# Patient Record
Sex: FEMALE | Race: WHITE | NOT HISPANIC OR LATINO | Employment: FULL TIME | URBAN - METROPOLITAN AREA
[De-identification: names, ages, dates, MRNs, and addresses within clinical notes are randomized per-mention and may not be internally consistent; named-entity substitution may affect disease eponyms.]

---

## 2017-01-09 ENCOUNTER — TELEPHONE (OUTPATIENT)
Dept: FAMILY MEDICINE | Facility: CLINIC | Age: 52
End: 2017-01-09

## 2017-01-09 NOTE — Clinical Note
January 20, 2017    Renée Mcfadden  [address] [facility] [address]      Dear Renée Mcfadden,     We have tried to contact you about your health, but have been unable to reach you.  Please call us as soon as possible so we can provide you with the best care possible.  We will continue to check in with you throughout the year to complete these items of care, if you are not able to complete these items at this time.  If you would like to complete the missing items for your care, please contact us at [phone].    We recommend the following:  -schedule a MAMMOGRAM 1 in 8 women will develop invasive breast cancer during her lifetime and it is the most common non-skin cancer in American women.  EARLY detection, new treatments, and a better understanding of the disease have increased survival rates - the 5 year survival rate in the 1960s was 63% and today it is close to 90% .  Please disregard this reminder if you have had this exam elsewhere within the last year.  It would be helpful for us to have a copy of your mammogram report in our file so that we can best coordinate your care - please contact us with when your test was done so we can update your record. Please call [phone] to schedule your mammogram today.   -schedule a COLONOSCOPY to look for colon cancer (due every 10 years or 5 years in higher risk situations.)   Colon cancer is now the second leading cause of death in the [place] for both men and women and there are over 130,000 new cases and 50,000 deaths per year from colon cancer.  Colonoscopies can prevent 90-95% of these deaths.  Problem lesions can be removed before they ever become cancer.  This test is not only looking for cancer, but also getting rid of precancerious lesions.  If you do not wish to do a colonoscopy or cannot afford to do one, at this time, there is another option. It is called a   -schedule a PAP SMEAR EXAM which is due.  Please disregard  this reminder if you have had this exam elsewhere within the last year.  It would be helpful for us to have a copy of your recent pap smear report in our file so that we can best coordinate your care.  -schedule a PHYSICAL with your provider.    Sincerely,     Your Care Team at [facility]

## 2017-01-09 NOTE — Clinical Note
January 9, 2017    Renée Mcfadden  [address]  [facility] [address]    Dear Renée    We care about your health and have reviewed your health plan. We have reviewed your medical conditions, medication list, and lab results and are making recommendations based on this review, to better manage your health.    You are in particular need of attention regarding:  - Scheduling a Breast Cancer Screening (Mammography) [phone]  - Scheduling a Colon Cancer Screening (Colonoscopy only) [phone]  - Scheduling a Physical with a Cervical Cancer Screening (Pap Smear) age 64 and younger [phone]      Here is a list of Health Maintenance topics that are due now or due soon:  Health Maintenance Due   Topic Date Due     HEPATITIS C SCREENING  09/23/1982     PAP SCREENING Q3 YR (SYSTEM ASSIGNED)  09/23/1985     MAMMO SCREEN Q2 YR (SYSTEM ASSIGNED)  09/23/2004     LIPID SCREEN Q5 YR FEMALE (SYSTEM ASSIGNED)  09/23/2009     COLON CANCER SCREEN (SYSTEM ASSIGNED)  09/23/2014     INFLUENZA VACCINE (SYSTEM ASSIGNED)  09/01/2016     We will be calling you in the next couple of weeks to help you schedule any appointments that are needed.  Please call us at [phone] (or use [employer]) to address the above recommendations.     Thank you for trusting [facility] and we appreciate the opportunity to serve you.  We look forward to supporting your healthcare needs in the future.    Healthy Regards,    Dr. Engelmann/rosalie

## 2017-01-09 NOTE — TELEPHONE ENCOUNTER
Panel Management Review      Patient has the following on her problem list:     Hypertension   Last three blood pressure readings:  BP Readings from Last 3 Encounters:   11/15/16 140/90   10/19/15 142/80   06/30/14 161/101     Blood pressure: Failed    HTN Guidelines:  Age 18-59 BP range:  Less than 140/90  Age 60-85 with Diabetes:  Less than 140/90  Age 60-85 without Diabetes:  less than 150/90      Composite cancer screening  Chart review shows that this patient is due/due soon for the following Pap Smear, Mammogram and Colonoscopy  Summary:    Patient is due/failing the following:   COLONOSCOPY, LDL, PAP and PHYSICAL    Action needed:   Patient needs office visit for pap, mammogram, colonoscopy.    Type of outreach:    Sent letter. 1/9/17    Questions for provider review:    None                                                                                                                                    Laura Bazzi [facility]        Chart routed to Care Team .

## 2017-01-20 NOTE — TELEPHONE ENCOUNTER
Called patient and left a message to return call and schedule an appointment for health maintenance items that are due. Final PM letter mailed.  Laura Bazzi CMA

## 2017-04-05 ENCOUNTER — TELEPHONE (OUTPATIENT)
Dept: FAMILY MEDICINE | Facility: CLINIC | Age: 52
End: 2017-04-05

## 2017-04-05 NOTE — LETTER
April 18, 2017    Renée Mcfadden  [address]  [facility] [address]      Dear Renée Mcfadden,     We have tried to contact you about your health, but have been unable to reach you.  Please call us as soon as possible so we can provide you with the best care possible.  We will continue to check in with you throughout the year to complete these items of care, if you are not able to complete these items at this time.  If you would like to complete the missing items for your care, please contact us at [phone].    We recommend the following:  -schedule a WELLNESS (Physical) APPOINTMENT with your provider.   I will check fasting labs the same day - nothing to eat except water and meds for 8-10 hours prior.  -schedule a MAMMOGRAM 1 in 8 women will develop invasive breast cancer during her lifetime and it is the most common non-skin cancer in American women.  EARLY detection, new treatments, and a better understanding of the disease have increased survival rates - the 5 year survival rate in the 1960s was 63% and today it is close to 90% .  Please disregard this reminder if you have had this exam elsewhere within the last year.  It would be helpful for us to have a copy of your mammogram report in our file so that we can best coordinate your care - please contact us with when your test was done so we can update your record. Please call [phone] to schedule your mammogram today.     Sincerely,     Your Care Team at [place]

## 2017-04-05 NOTE — TELEPHONE ENCOUNTER
4/5/17- Routed to Dr. Engelmann to please sign order for mammogram. Thanks!    Martha Mendoza MA

## 2017-04-05 NOTE — TELEPHONE ENCOUNTER
Panel Management Review      Patient has the following on her problem list:       IVD   ASA: FAILED    Last LDL:    No results found for: CHOL  No results found for: HDL  No results found for: LDL  No results found for: TRIG   No results found for: CHOLHDLRATIO     Is the patient on a Statin? NO   Is the patient on Aspirin? NO                    Last three blood pressure readings:  BP Readings from Last 3 Encounters:   11/15/16 140/90   10/19/15 142/80   06/30/14 (!) 161/101        Tobacco History:     History   Smoking Status     Current Every Day Smoker     Packs/day: 0.50     Years: 35.00     Types: Cigarettes     Start date: 1/1/1979   Smokeless Tobacco     Never Used         Composite cancer screening  Chart review shows that this patient is due/due soon for the following Mammogram  Summary:    Patient is due/failing the following:   MAMMOGRAM    Action needed:   Patient needs referral/order: Mammogram    Type of outreach:    Sent letter.    Questions for provider review:    None                                                                                                                                    Martha Mendoza MA       Chart routed to Care Team .

## 2017-04-05 NOTE — LETTER
April 5, 2017    Renée Mcfadden  [address]  [facility] [address]    Dear Renée    We care about your health and have reviewed your health plan. We have reviewed your medical conditions, medication list, and lab results and are making recommendations based on this review, to better manage your health.    You are in particular need of attention regarding:  - Scheduling a Breast Cancer Screening (Mammography) [phone]      Here is a list of Health Maintenance topics that are due now or due soon:  Health Maintenance Due   Topic Date Due     HEPATITIS C SCREENING  09/23/1982     PAP SCREENING Q3 YR (SYSTEM ASSIGNED)  09/23/1985     MAMMO SCREEN Q2 YR (SYSTEM ASSIGNED)  09/23/2004     LIPID SCREEN Q5 YR FEMALE (SYSTEM ASSIGNED)  09/23/2009     COLON CANCER SCREEN (SYSTEM ASSIGNED)  09/23/2014     We will be calling you in the next couple of weeks to help you schedule any appointments that are needed.  Please call us at [phone] (or use [employer]) to address the above recommendations.     Thank you for trusting [facility] and we appreciate the opportunity to serve you.  We look forward to supporting your healthcare needs in the future.    Healthy Regards,    Your Care Team

## 2017-04-06 NOTE — TELEPHONE ENCOUNTER
Please call her first and see if she plans to see a provider at this clinic for primary care. If so, she needs a physical. Thank you. I will wait to sign orders until we hear back from her.     Lauren Engelmann, MD

## 2017-04-18 NOTE — TELEPHONE ENCOUNTER
4/18/17- 2nd attempt calling patient, she was not available. Left another message to call us back when she can.  Closed encounter and final letter sent for Mammogram and physical.  Martha Mendoza MA

## 2017-06-17 ENCOUNTER — HEALTH MAINTENANCE LETTER (OUTPATIENT)
Age: 52
End: 2017-06-17

## 2017-07-07 ENCOUNTER — TELEPHONE (OUTPATIENT)
Dept: FAMILY MEDICINE | Facility: CLINIC | Age: 52
End: 2017-07-07

## 2017-07-07 DIAGNOSIS — Z12.31 ENCOUNTER FOR SCREENING MAMMOGRAM FOR BREAST CANCER: Primary | ICD-10-CM

## 2017-07-07 NOTE — TELEPHONE ENCOUNTER
Panel Management Review      Patient has the following on her problem list: None      Composite cancer screening  Chart review shows that this patient is due/due soon for the following Pap Smear, Mammogram and Colonoscopy  Summary:    Patient is due/failing the following:   COLONOSCOPY, MAMMOGRAM and PAP    Action needed:   Patient needs referral/order: Mammogram, colon and OV for pap    Type of outreach:    Sent letter.    Questions for provider review:    None                                                                                                                                    Martha Mendoza MA       Chart routed to Care Team .

## 2017-07-07 NOTE — LETTER
July 7, 2017    Renée Mcfadden  [address]  [facility] [address]    Dear Renée    We care about your health and have reviewed your health plan. We have reviewed your medical conditions, medication list, and lab results and are making recommendations based on this review, to better manage your health.    You are in particular need of attention regarding:  - Scheduling a Breast Cancer Screening (Mammography) [phone]  - Scheduling a Colon Cancer Screening (Colonoscopy only) [phone]  - Scheduling a Physical with a Cervical Cancer Screening (Pap Smear) age 64 and younger [phone]      Here is a list of Health Maintenance topics that are due now or due soon:  Health Maintenance Due   Topic Date Due     HEPATITIS C SCREENING  09/23/1982     PAP SCREENING Q3 YR (SYSTEM ASSIGNED)  09/23/1985     MAMMO SCREEN Q2 YR (SYSTEM ASSIGNED)  09/23/2004     LIPID SCREEN Q5 YR FEMALE (SYSTEM ASSIGNED)  09/23/2009     COLON CANCER SCREEN (SYSTEM ASSIGNED)  09/23/2014     We will be calling you in the next couple of weeks to help you schedule any appointments that are needed.  Please call us at [phone] (or use [employer]) to address the above recommendations.     Thank you for trusting [facility] and we appreciate the opportunity to serve you.  We look forward to supporting your healthcare needs in the future.    Healthy Regards,    Your Care Team

## 2017-07-07 NOTE — LETTER
July 20, 2017    Renée Mcfadden  [address] [facility] [address]      Dear Renée Mcfadden,     We have tried to contact you about your health, but have been unable to reach you.  Please call us as soon as possible so we can provide you with the best care possible.  We will continue to check in with you throughout the year to complete these items of care, if you are not able to complete these items at this time.  If you would like to complete the missing items for your care, please contact us at [phone].    We recommend the following:  -schedule a MAMMOGRAM 1 in 8 women will develop invasive breast cancer during her lifetime and it is the most common non-skin cancer in American women.  EARLY detection, new treatments, and a better understanding of the disease have increased survival rates - the 5 year survival rate in the 1960s was 63% and today it is close to 90% .  Please disregard this reminder if you have had this exam elsewhere within the last year.  It would be helpful for us to have a copy of your mammogram report in our file so that we can best coordinate your care - please contact us with when your test was done so we can update your record. Please call [phone] to schedule your mammogram today.   -schedule a COLONOSCOPY to look for colon cancer (due every 10 years or 5 years in higher risk situations.)   Colon cancer is now the second leading cause of death in the [place] for both men and women and there are over 130,000 new cases and 50,000 deaths per year from colon cancer.  Colonoscopies can prevent 90-95% of these deaths.  Problem lesions can be removed before they ever become cancer.  This test is not only looking for cancer, but also getting rid of precancerious lesions.  If you do not wish to do a colonoscopy or cannot afford to do one, at this time, there is another option. It is called a   -schedule a PAP SMEAR EXAM which is due.  Please disregard this  reminder if you have had this exam elsewhere within the last year.  It would be helpful for us to have a copy of your recent pap smear report in our file so that we can best coordinate your care.    Sincerely,     Your Care Team at [place]

## 2017-07-09 NOTE — TELEPHONE ENCOUNTER
Mammogram order signed.     Brielle Jon MD.   Family Physician.  [facility].

## 2017-10-09 ENCOUNTER — TELEPHONE (OUTPATIENT)
Dept: FAMILY MEDICINE | Facility: CLINIC | Age: 52
End: 2017-10-09

## 2017-10-09 NOTE — TELEPHONE ENCOUNTER
Panel Management Review      Patient has the following on her problem list: None      Composite cancer screening  Chart review shows that this patient is due/due soon for the following Pap Smear, Mammogram and Colonoscopy  Summary:    Patient is due/failing the following:   COLONOSCOPY, MAMMOGRAM, PAP and PHYSICAL    Action needed:   Patient needs office visit for physical, pap, mammogram, colonoscopy.    Type of outreach:    Sent letter. 10/9/17    Questions for provider review:    None                                                                                                                                    Laura Bazzi [facility]        Chart routed to Care Team .

## 2017-10-09 NOTE — LETTER
October 9, 2017    Renée Mcfadden  [address]  [facility] [address]    Dear Renée    We care about your health and have reviewed your health plan. We have reviewed your medical conditions, medication list, and lab results and are making recommendations based on this review, to better manage your health.    You are in particular need of attention regarding:  - Scheduling a Breast Cancer Screening (Mammography) [phone]  - Scheduling a Colon Cancer Screening (Colonoscopy only) [phone]  - Scheduling a Physical with a Cervical Cancer Screening (Pap Smear) age 64 and younger [phone]    Here is a list of Health Maintenance topics that are due now or due soon:  Health Maintenance Due   Topic Date Due     HEPATITIS C SCREENING  09/23/1982     PAP SCREENING Q3 YR (SYSTEM ASSIGNED)  09/23/1985     LIPID SCREEN Q5 YR FEMALE (SYSTEM ASSIGNED)  09/23/2009     MAMMO SCREEN Q2 YR (SYSTEM ASSIGNED)  09/23/2014     COLON CANCER SCREEN (SYSTEM ASSIGNED)  09/23/2014     INFLUENZA VACCINE (SYSTEM ASSIGNED)  09/01/2017     We will be calling you in the next couple of weeks to help you schedule any appointments that are needed.  Please call us at [phone] (or use [employer]) to address the above recommendations.     Thank you for trusting [facility] and we appreciate the opportunity to serve you.  We look forward to supporting your healthcare needs in the future.    Healthy Regards, Dr, Engelmann/rosalie

## 2017-10-10 ENCOUNTER — TELEPHONE (OUTPATIENT)
Dept: FAMILY MEDICINE | Facility: CLINIC | Age: 52
End: 2017-10-10

## 2017-10-10 DIAGNOSIS — I10 BENIGN ESSENTIAL HYPERTENSION: ICD-10-CM

## 2017-10-10 DIAGNOSIS — I73.00 RAYNAUD'S DISEASE WITHOUT GANGRENE: ICD-10-CM

## 2017-10-10 RX ORDER — NIFEDIPINE 60 MG/1
60 TABLET, EXTENDED RELEASE ORAL DAILY
Qty: 30 TABLET | Refills: 0 | Status: SHIPPED | OUTPATIENT
Start: 2017-10-10 | End: 2017-11-10

## 2017-10-10 NOTE — TELEPHONE ENCOUNTER
Reason for Call:  Medication or medication refill:    Do you use a [facility]?  Name of the pharmacy and phone number for the current request:  [id number] - [place], [place] - [address] [place] [address] [address] AT [facility] [address]    Name of the medication requested: NIFEdipine ER osmotic (PROCARDIA XL) 60 MG TB24    Other request: Patient is requesting refill. Please call if any questions.    Can we leave a detailed message on this number? YES    Phone number patient can be reached at: Home number on file [phone] (home)    Best Time: Anytime    Call taken on 10/10/2017 at 1:56 PM by Cheyenne Arredondo

## 2017-11-10 ENCOUNTER — RESULT FOLLOW UP (OUTPATIENT)
Dept: FAMILY MEDICINE | Facility: CLINIC | Age: 52
End: 2017-11-10

## 2017-11-10 ENCOUNTER — OFFICE VISIT (OUTPATIENT)
Dept: FAMILY MEDICINE | Facility: CLINIC | Age: 52
End: 2017-11-10
Payer: COMMERCIAL

## 2017-11-10 VITALS
HEART RATE: 76 BPM | TEMPERATURE: 97.5 F | BODY MASS INDEX: 33.42 KG/M2 | OXYGEN SATURATION: 97 % | WEIGHT: 196 LBS | DIASTOLIC BLOOD PRESSURE: 86 MMHG | SYSTOLIC BLOOD PRESSURE: 133 MMHG

## 2017-11-10 DIAGNOSIS — R87.612 PAPANICOLAOU SMEAR OF CERVIX WITH LOW GRADE SQUAMOUS INTRAEPITHELIAL LESION (LGSIL): ICD-10-CM

## 2017-11-10 DIAGNOSIS — I10 BENIGN ESSENTIAL HYPERTENSION: ICD-10-CM

## 2017-11-10 DIAGNOSIS — I73.00 RAYNAUD'S DISEASE WITHOUT GANGRENE: ICD-10-CM

## 2017-11-10 DIAGNOSIS — Z13.220 SCREENING FOR LIPID DISORDERS: ICD-10-CM

## 2017-11-10 DIAGNOSIS — Z12.11 SPECIAL SCREENING FOR MALIGNANT NEOPLASMS, COLON: ICD-10-CM

## 2017-11-10 DIAGNOSIS — Z01.419 WELL WOMAN EXAM WITH ROUTINE GYNECOLOGICAL EXAM: Primary | ICD-10-CM

## 2017-11-10 LAB
ANION GAP SERPL CALCULATED.3IONS-SCNC: 12 MMOL/L (ref 3–14)
BUN SERPL-MCNC: 9 MG/DL (ref 7–30)
CALCIUM SERPL-MCNC: 9 MG/DL (ref 8.5–10.1)
CHLORIDE SERPL-SCNC: 105 MMOL/L (ref 94–109)
CHOLEST SERPL-MCNC: 223 MG/DL
CO2 SERPL-SCNC: 22 MMOL/L (ref 20–32)
CREAT SERPL-MCNC: 0.66 MG/DL (ref 0.52–1.04)
GFR SERPL CREATININE-BSD FRML MDRD: >90 ML/MIN/1.7M2
GLUCOSE SERPL-MCNC: 90 MG/DL (ref 70–99)
HDLC SERPL-MCNC: 76 MG/DL
LDLC SERPL CALC-MCNC: 133 MG/DL
NONHDLC SERPL-MCNC: 147 MG/DL
POTASSIUM SERPL-SCNC: 4.2 MMOL/L (ref 3.4–5.3)
SODIUM SERPL-SCNC: 139 MMOL/L (ref 133–144)
TRIGL SERPL-MCNC: 69 MG/DL

## 2017-11-10 PROCEDURE — 87624 HPV HI-RISK TYP POOLED RSLT: CPT | Performed by: FAMILY MEDICINE

## 2017-11-10 PROCEDURE — 36415 COLL VENOUS BLD VENIPUNCTURE: CPT | Performed by: FAMILY MEDICINE

## 2017-11-10 PROCEDURE — 86803 HEPATITIS C AB TEST: CPT | Performed by: FAMILY MEDICINE

## 2017-11-10 PROCEDURE — 99396 PREV VISIT EST AGE 40-64: CPT | Performed by: FAMILY MEDICINE

## 2017-11-10 PROCEDURE — G0145 SCR C/V CYTO,THINLAYER,RESCR: HCPCS | Performed by: FAMILY MEDICINE

## 2017-11-10 PROCEDURE — G0124 SCREEN C/V THIN LAYER BY MD: HCPCS | Performed by: FAMILY MEDICINE

## 2017-11-10 PROCEDURE — 80048 BASIC METABOLIC PNL TOTAL CA: CPT | Performed by: FAMILY MEDICINE

## 2017-11-10 PROCEDURE — 80061 LIPID PANEL: CPT | Performed by: FAMILY MEDICINE

## 2017-11-10 RX ORDER — NIFEDIPINE 60 MG/1
60 TABLET, EXTENDED RELEASE ORAL DAILY
Qty: 90 TABLET | Refills: 3 | Status: SHIPPED | OUTPATIENT
Start: 2017-11-10 | End: 2018-12-12

## 2017-11-10 ASSESSMENT — PAIN SCALES - GENERAL: PAINLEVEL: NO PAIN (0)

## 2017-11-10 NOTE — LETTER
January 10, 2018      Renée Mcfadden  [address] [facility] [address]    Dear ,      At [facility], your health and wellness is our primary concern. That is why we are following up on an abnormal pap from 11/10/17, which was reported as LSIL and positive for high risk HPV 16 and 18. Your provider had recommended that you have a Colposcopy completed by 2/10/18. Our records do not show that this has been scheduled.    It is important to complete the follow up that your provider has suggested for you to ensure that there are no worsening changes which may, over time, develop into cancer.      Please contact our [place] office at  [phone] to schedule an appointment for a Colposcopy at your earliest convenience. If you have questions or concerns, please call the clinic and we will be happy to assist you.    If you have completed the tests outside of [facility], please have the results forwarded to our office. We will update the chart for your primary Physician to review before your next annual physical.     Thank you for choosing [facility]!    Sincerely,      Lauren A. Engelmann, MD/benjy

## 2017-11-10 NOTE — PROGRESS NOTES
SUBJECTIVE:   CC: Renée Mcfadden is an 52 year old woman who presents for preventive health visit.   Answers for HPI/ROS submitted by the patient on 11/10/2017   PHQ-2 Score: 2    Physical   Annual:     Getting at least 3 servings of Calcium per day::  NO    Bi-annual eye exam::  Yes    Dental care twice a year::  NO    Sleep apnea or symptoms of sleep apnea::  Excessive snoring    Diet::  Low salt    Taking medications regularly::  No    Barriers to taking medications::  Problems remembering to take them        Would like you to look at left mole on left arm.      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     Social History   Substance Use Topics     Smoking status: Current Every Day Smoker     Packs/day: 0.50     Years: 35.00     Types: Cigarettes     Start date: 1/1/1979     Smokeless tobacco: Never Used     Alcohol use 0.0 oz/week     0 Standard drinks or equivalent per week      Comment: 3-4 beers weekly     The patient does not drink >3 drinks per day nor >7 drinks per week.    Reviewed orders with patient.  Reviewed health maintenance and updated orders accordingly - Yes  Labs reviewed in EPIC  BP Readings from Last 3 Encounters:   11/10/17 133/86   11/15/16 140/90   10/19/15 142/80    Wt Readings from Last 3 Encounters:   11/10/17 196 lb (88.9 kg)   11/15/16 194 lb (88 kg)   10/19/15 204 lb 12.8 oz (92.9 kg)                      Patient over age 50, mutual decision to screen reflected in health maintenance.      Pertinent mammograms are reviewed under the imaging tab.  History of abnormal Pap smear: NO  - age 30-65 PAP every 5 years with negative HPV co-testing recommended    Reviewed and updated as needed this visit by clinical staff         Reviewed and updated as needed this visit by Provider          Review of Systems  C: NEGATIVE for fever, chills, change in weight  I: NEGATIVE for worrisome rashes, moles or lesions  E: NEGATIVE for vision changes or irritation  ENT: NEGATIVE for ear, mouth and throat problems  R: NEGATIVE for significant cough or SOB  B: NEGATIVE for masses, tenderness or discharge  CV: NEGATIVE for chest pain, palpitations or peripheral edema  GI: NEGATIVE for nausea, abdominal pain, heartburn, or change in bowel habits  : NEGATIVE for unusual urinary or vaginal symptoms. Periods are regular.  M: NEGATIVE for significant arthralgias or myalgia  N: NEGATIVE for weakness, dizziness or paresthesias  P: NEGATIVE for changes in mood or affect     OBJECTIVE:   There were no vitals taken for this visit.  Physical Exam  GENERAL: healthy, alert, no distress and over weight  NECK: no adenopathy, no asymmetry, masses, or scars and thyroid normal to palpation  RESP: lungs clear to auscultation - no rales, rhonchi or wheezes  CV: regular rate and rhythm, normal S1 S2, no S3 or S4, no murmur, click or rub, no peripheral edema and peripheral pulses strong  ABDOMEN: soft, nontender, no hepatosplenomegaly, no masses and bowel sounds normal   (female): normal female external genitalia, normal urethral meatus, vaginal mucosa, normal cervix/adnexa/uterus without masses or discharge  MS: no gross musculoskeletal defects noted, no edema  NEURO: Normal strength and tone, mentation intact and speech normal  BACK: no CVA tenderness, no paralumbar tenderness  PSYCH: mentation appears normal, affect normal/bright    ASSESSMENT/PLAN:       ICD-10-CM    1. Well woman exam with routine gynecological exam Z01.419 *MA Screening Digital Bilateral     HPV High Risk Types DNA Cervical     Hepatitis C Screen Reflex to HCV  "RNA Quant and Genotype   2. Raynaud's disease without gangrene I73.00 Basic metabolic panel     NIFEdipine ER osmotic (PROCARDIA XL) 60 MG TB24   3. Benign essential hypertension I10 NIFEdipine ER osmotic (PROCARDIA XL) 60 MG TB24   4. Screening for lipid disorders Z13.220 Lipid panel reflex to direct LDL Fasting   5. Special screening for malignant neoplasms, colon Z12.11 Fecal colorectal cancer screen (FIT)     Pap imaged thin layer screen with HPV - recommended age 30 - 65 years (select HPV order below)       COUNSELING:  Reviewed preventive health counseling, as reflected in patient instructions       Regular exercise       Colon cancer screening       Consider Hep C screening for patients born between 1945 and 1965       Consider lung cancer screening for ages 55-80 years and 30 pack-year smoking history        One time pneumovax for smokers         reports that she has been smoking Cigarettes.  She started smoking about 38 years ago. She has a 17.50 pack-year smoking history. She has never used smokeless tobacco.  Tobacco Cessation Action Plan: Information offered: Patient not interested at this time  Estimated body mass index is 33.08 kg/(m^2) as calculated from the following:    Height as of 10/19/15: 5' 4.21\" (1.631 m).    Weight as of 11/15/16: 194 lb (88 kg).   Weight management plan: Discussed healthy diet and exercise guidelines and patient will follow up in 6 months in clinic to re-evaluate.    Counseling Resources:  ATP IV Guidelines  Pooled Cohorts Equation Calculator  Breast Cancer Risk Calculator  FRAX Risk Assessment  ICSI Preventive Guidelines  Dietary Guidelines for Americans, 2010  USDA's MyPlate  ASA Prophylaxis  Lung CA Screening    Lauren A. Engelmann, MD  [facility]  "

## 2017-11-10 NOTE — MR AVS SNAPSHOT
After Visit Summary   11/10/2017    Renée Mcfadden    MRN: [id number]           Patient Information     Date Of Birth          1965        Visit Information        Provider Department      11/10/2017 8:40 AM Engelmann, Lauren Anneliese, MD [facility]        Today's Diagnoses     Well woman exam with routine gynecological exam    -  1    Raynaud's disease without gangrene        Benign essential hypertension        Screening for lipid disorders        Special screening for malignant neoplasms, colon           Follow-ups after your visit        Your next 10 appointments already scheduled     Dec 04, 2017  7:30 AM CST   MA SCREENING DIGITAL BILATERAL with CPMA1   [facility]    [address]  [place] [address]   [phone]           Do not use any powder, lotion or deodorant under your arms or on your breast. If you do, we will ask you to remove it before your exam.  Wear comfortable, two-piece clothing.  If you have any allergies, tell your care team.  Bring any previous mammograms from other facilities or have them mailed to the breast center.              Future tests that were ordered for you today     Open Future Orders        Priority Expected Expires Ordered    *MA Screening Digital Bilateral Routine  11/10/2018 11/10/2017    Fecal colorectal cancer screen (FIT) Routine 12/1/2017 2/2/2018 11/10/2017            Who to contact     If you have questions or need follow up information about today's clinic visit or your schedule please contact [facility] directly at [phone].  Normal or non-critical lab and imaging results will be communicated to you by MyChart, letter or phone within 4 business days after the clinic has received the results. If you do not hear from us within 7 days, please contact the clinic through MyChart or phone. If you have a critical or  abnormal lab result, we will notify you by phone as soon as possible.  Submit refill requests through [facility] or call your pharmacy and they will forward the refill request to us. Please allow 3 business days for your refill to be completed.          Additional Information About Your Visit        [employer] Information     [facility] gives you secure access to your electronic health record. If you see a primary care provider, you can also send messages to your care team and make appointments. If you have questions, please call your primary care clinic.  If you do not have a primary care provider, please call [phone] and they will assist you.        Care EveryWhere ID     This is your Care EveryWhere ID. This could be used by other organizations to access your [facility] medical records  [id number]        Your Vitals Were     Pulse Temperature Pulse Oximetry BMI (Body Mass Index)          76 97.5  F (36.4  C) (Oral) 97% 33.42 kg/m2         Blood Pressure from Last 3 Encounters:   11/10/17 133/86   11/15/16 140/90   10/19/15 142/80    Weight from Last 3 Encounters:   11/10/17 196 lb (88.9 kg)   11/15/16 194 lb (88 kg)   10/19/15 204 lb 12.8 oz (92.9 kg)              We Performed the Following     Basic metabolic panel     Hepatitis C Screen Reflex to HCV RNA Quant and Genotype     HPV High Risk Types DNA Cervical     Lipid panel reflex to direct LDL Fasting     Pap imaged thin layer screen with HPV - recommended age 30 - 65 years (select HPV order below)          Where to get your medicines      These medications were sent to [id number] - [place], [place] - [address] [facility] [address] [facility] [address]  [address] [facility], [place] [address]     Phone:  [phone]     NIFEdipine ER osmotic 60 MG Tb24          Primary Care Provider Office Phone # Fax #    [facility] [phone]       [address] [facility] [address]        Equal  Access to Services     [facility]: Hadii aad ku hadtrevingretel Eloisakirk, waaristidesda luqadaha, qasimonta kaarmandocecile simmons. So [facility] [phone].    ATENCIÓN: Si habla español, tiene a beatty disposición servicios gratuitos de asistencia lingüística. Llame al [phone].    We comply with applicable federal civil rights laws and [place] laws. We do not discriminate on the basis of race, color, national origin, age, disability, sex, sexual orientation, or gender identity.            Thank you!     Thank you for choosing [facility]  for your care. Our goal is always to provide you with excellent care. Hearing back from our patients is one way we can continue to improve our services. Please take a few minutes to complete the written survey that you may receive in the mail after your visit with us. Thank you!             Your Updated Medication List - Protect others around you: Learn how to safely use, store and throw away your medicines at www.disposemymeds.org.          This list is accurate as of: 11/10/17 10:04 AM.  Always use your most recent med list.                   Brand Name Dispense Instructions for use Diagnosis    NIFEdipine ER osmotic 60 MG Tb24    PROCARDIA XL    90 tablet    Take 1 tablet (60 mg) by mouth daily    Benign essential hypertension, Raynaud's disease without gangrene

## 2017-11-10 NOTE — NURSING NOTE
"Chief Complaint   Patient presents with     Physical     PAP       Initial /86 (BP Location: Right arm, Patient Position: Chair, Cuff Size: Adult Regular)  Pulse 76  Temp 97.5  F (36.4  C) (Oral)  Wt 196 lb (88.9 kg)  SpO2 97%  BMI 33.42 kg/m2 Estimated body mass index is 33.42 kg/(m^2) as calculated from the following:    Height as of 10/19/15: 5' 4.21\" (1.631 m).    Weight as of this encounter: 196 lb (88.9 kg).  Medication Reconciliation: complete   Martha Mendoza MA      "

## 2017-11-10 NOTE — LETTER
April 28, 2018      Renée Mcfadden  [address] [place] [address]    Dear ,      At [facility], your health and wellness is our primary concern. That is why we are following up on an abnormal pap from 11/10/17, which was reported as LSIL and positive for high risk HPV 16 and 18. Your provider had recommended that you have a Colposcopy completed by 2/10/18. Our records do not show that this has been scheduled.      It is important to complete the follow up that your provider has suggested for you to ensure that there are no worsening changes which may, over time, develop into cancer.      If you have chosen not to do the recommended colposcopy, please contact our [facility] office at [phone] to schedule an appointment for a repeat PAP smear and HPV test at your earliest convenience.    If you have completed the tests outside of [facility], please have the results forwarded to our office. We will update the chart for your primary Physician to review before your next annual physical.     Thank you for choosing [facility]!    Sincerely,      Lauren A. Engelmann, MD/benjy

## 2017-11-11 LAB — HCV AB SERPL QL IA: NONREACTIVE

## 2017-11-16 LAB
COPATH REPORT: ABNORMAL
PAP: ABNORMAL

## 2017-11-17 LAB
FINAL DIAGNOSIS: ABNORMAL
HPV HR 12 DNA CVX QL NAA+PROBE: NEGATIVE
HPV16 DNA SPEC QL NAA+PROBE: POSITIVE
HPV18 DNA SPEC QL NAA+PROBE: POSITIVE
SPECIMEN DESCRIPTION: ABNORMAL

## 2017-11-17 NOTE — PROGRESS NOTES
11/10/17 LSIL pap, + HR HPV 16 and 18. Plan: colp bef 2/10/18  11/20/17 Pt. Notified.  1/10/18 [place] reminder letter sent (rlm)  2/13/18 3 month [place] not done, updated to 6mo [place]/Pap due by 5/10/18 (rlm)  2/19/18 FYI sent to provider.  4/28/18 [place] reminder letter sent (rlm)  2/5/19 colp - no-show (bill)  2/19/19 Pap Follow up reminder call placed, message left with whoever answered home phone (rlm)  3/5/19 Patient is lost to follow-up. Routed to provider as FYI. (rlm) Doned by: Engelmann, Lauren Anneliese, MD     on Wed Mar 6, 2019

## 2017-12-04 ENCOUNTER — RADIANT APPOINTMENT (OUTPATIENT)
Dept: MAMMOGRAPHY | Facility: CLINIC | Age: 52
End: 2017-12-04
Attending: FAMILY MEDICINE
Payer: COMMERCIAL

## 2017-12-04 DIAGNOSIS — Z01.419 WELL WOMAN EXAM WITH ROUTINE GYNECOLOGICAL EXAM: ICD-10-CM

## 2017-12-04 PROCEDURE — G0202 SCR MAMMO BI INCL CAD: HCPCS | Mod: TC

## 2018-07-14 ENCOUNTER — HEALTH MAINTENANCE LETTER (OUTPATIENT)
Age: 53
End: 2018-07-14

## 2018-12-12 DIAGNOSIS — I10 BENIGN ESSENTIAL HYPERTENSION: ICD-10-CM

## 2018-12-12 DIAGNOSIS — I73.00 RAYNAUD'S DISEASE WITHOUT GANGRENE: ICD-10-CM

## 2018-12-12 NOTE — TELEPHONE ENCOUNTER
Routing refill request to provider for review/approval because:  Labs not current:  creatinine  Blood pressure    Selena Fine RN

## 2018-12-12 NOTE — TELEPHONE ENCOUNTER
"Requested Prescriptions   Pending Prescriptions Disp Refills     NIFEdipine ER OSMOTIC (PROCARDIA XL) 60 MG 24 hr tablet [Pharmacy Med Name: NIFEDIPINE 60MG ER (XL/OS) TABLETS] 90 tablet 0    Last Written Prescription Date:  11-10-17  Last Fill Quantity: 90,  # refills: 3   Last office visit: 11/10/2017 with prescribing provider:  11-10-17   Future Office Visit:   Next 5 appointments (look out 90 days)    Dec 14, 2018  2:40 PM CST  PHYSICAL with Lauren Anneliese Engelmann, MD  [facility] [address] [place] [address] [phone]          Sig: TAKE 1 TABLET(60 MG) BY MOUTH DAILY    Calcium Channel Blockers Protocol  Failed - 12/12/2018  3:32 AM       Failed - Blood pressure under 140/90 in past 12 months    BP Readings from Last 3 Encounters:   11/10/17 133/86   11/15/16 140/90   10/19/15 142/80                Failed - Normal serum creatinine on file in past 12 months    Recent Labs   Lab Test 11/10/17  1008   CR 0.66            Passed - Recent (12 mo) or future (30 days) visit within the authorizing provider's specialty    Patient had office visit in the last 12 months or has a visit in the next 30 days with authorizing provider or within the authorizing provider's specialty.  See \"Patient Info\" tab in inbasket, or \"Choose Columns\" in Meds & Orders section of the refill encounter.             Passed - Patient is age 18 or older       Passed - No active pregnancy on record       Passed - No positive pregnancy test in past 12 months          "

## 2018-12-13 RX ORDER — NIFEDIPINE 60 MG/1
TABLET, EXTENDED RELEASE ORAL
Qty: 30 TABLET | Refills: 0 | Status: SHIPPED | OUTPATIENT
Start: 2018-12-13 | End: 2018-12-21

## 2018-12-21 ENCOUNTER — OFFICE VISIT (OUTPATIENT)
Dept: FAMILY MEDICINE | Facility: CLINIC | Age: 53
End: 2018-12-21
Payer: COMMERCIAL

## 2018-12-21 VITALS
DIASTOLIC BLOOD PRESSURE: 92 MMHG | TEMPERATURE: 98.4 F | HEART RATE: 80 BPM | WEIGHT: 185 LBS | HEIGHT: 64 IN | BODY MASS INDEX: 31.58 KG/M2 | OXYGEN SATURATION: 97 % | SYSTOLIC BLOOD PRESSURE: 159 MMHG

## 2018-12-21 DIAGNOSIS — Z13.220 SCREENING FOR LIPID DISORDERS: ICD-10-CM

## 2018-12-21 DIAGNOSIS — Z00.00 ROUTINE HISTORY AND PHYSICAL EXAMINATION OF ADULT: Primary | ICD-10-CM

## 2018-12-21 DIAGNOSIS — I73.00 RAYNAUD'S DISEASE WITHOUT GANGRENE: ICD-10-CM

## 2018-12-21 DIAGNOSIS — I10 BENIGN ESSENTIAL HYPERTENSION: ICD-10-CM

## 2018-12-21 DIAGNOSIS — Z12.11 SPECIAL SCREENING FOR MALIGNANT NEOPLASMS, COLON: ICD-10-CM

## 2018-12-21 DIAGNOSIS — F17.200 SMOKER: ICD-10-CM

## 2018-12-21 DIAGNOSIS — R87.612 PAPANICOLAOU SMEAR OF CERVIX WITH LOW GRADE SQUAMOUS INTRAEPITHELIAL LESION (LGSIL): ICD-10-CM

## 2018-12-21 PROCEDURE — 99396 PREV VISIT EST AGE 40-64: CPT | Performed by: FAMILY MEDICINE

## 2018-12-21 RX ORDER — NIFEDIPINE 60 MG/1
TABLET, EXTENDED RELEASE ORAL
Qty: 90 TABLET | Refills: 3 | Status: SHIPPED | OUTPATIENT
Start: 2018-12-21 | End: 2019-04-09

## 2018-12-21 ASSESSMENT — ENCOUNTER SYMPTOMS
SORE THROAT: 0
FREQUENCY: 0
PARESTHESIAS: 0
SHORTNESS OF BREATH: 0
COUGH: 0
MYALGIAS: 1
FEVER: 0
NERVOUS/ANXIOUS: 1
DIZZINESS: 0
PALPITATIONS: 0
DIARRHEA: 0
WEAKNESS: 0
CHILLS: 0
HEMATURIA: 0
NAUSEA: 0
DYSURIA: 0
ARTHRALGIAS: 1
BREAST MASS: 0
EYE PAIN: 0
CONSTIPATION: 0

## 2018-12-21 ASSESSMENT — PAIN SCALES - GENERAL: PAINLEVEL: NO PAIN (0)

## 2018-12-21 ASSESSMENT — MIFFLIN-ST. JEOR: SCORE: 1424.15
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
## 2018-12-21 NOTE — PATIENT INSTRUCTIONS
QUITPLAN: [phone])    Please call if you need to reschedule your colposcopy.     Come back for fasting labs soon. They are all ordered so you just have to make a lab appointment.

## 2019-02-15 ENCOUNTER — HEALTH MAINTENANCE LETTER (OUTPATIENT)
Age: 54
End: 2019-02-15

## 2019-02-19 ENCOUNTER — TELEPHONE (OUTPATIENT)
Dept: FAMILY MEDICINE | Facility: CLINIC | Age: 54
End: 2019-02-19

## 2019-02-19 NOTE — TELEPHONE ENCOUNTER
Pt is past due for Pap follow up  Reminder letter has been sent  LMTC her clinic with any questions or to schedule    Brittani Connors,   Pap Tracking

## 2019-04-09 ENCOUNTER — MYC REFILL (OUTPATIENT)
Dept: FAMILY MEDICINE | Facility: CLINIC | Age: 54
End: 2019-04-09

## 2019-04-09 ENCOUNTER — MYC MEDICAL ADVICE (OUTPATIENT)
Dept: FAMILY MEDICINE | Facility: CLINIC | Age: 54
End: 2019-04-09

## 2019-04-09 DIAGNOSIS — I10 BENIGN ESSENTIAL HYPERTENSION: ICD-10-CM

## 2019-04-09 DIAGNOSIS — I73.00 RAYNAUD'S DISEASE WITHOUT GANGRENE: ICD-10-CM

## 2019-04-10 RX ORDER — NIFEDIPINE 60 MG/1
TABLET, EXTENDED RELEASE ORAL
Qty: 45 TABLET | Refills: 0 | Status: SHIPPED | OUTPATIENT
Start: 2019-04-10 | End: 2019-05-21

## 2019-04-10 NOTE — TELEPHONE ENCOUNTER
"Requested Prescriptions   Pending Prescriptions Disp Refills     NIFEdipine ER OSMOTIC (PROCARDIA XL) 60 MG 24 hr tablet 90 tablet 3     Sig: TAKE 1 TABLET(60 MG) BY MOUTH DAILY       Calcium Channel Blockers Protocol  Failed - 4/9/2019  8:07 PM        Failed - Blood pressure under 140/90 in past 12 months     BP Readings from Last 3 Encounters:   12/21/18 (!) 159/92   11/10/17 133/86   11/15/16 140/90                 Failed - Normal serum creatinine on file in past 12 months     Recent Labs   Lab Test 11/10/17  1008   CR 0.66             Passed - Recent (12 mo) or future (30 days) visit within the authorizing provider's specialty     Patient had office visit in the last 12 months or has a visit in the next 30 days with authorizing provider or within the authorizing provider's specialty.  See \"Patient Info\" tab in inbasket, or \"Choose Columns\" in Meds & Orders section of the refill encounter.              Passed - Medication is active on med list        Passed - Patient is age 18 or older        Passed - No active pregnancy on record        Passed - No positive pregnancy test in past 12 months      Routing refill request to provider for review/approval because:  Requesting pharmacy does not accept transferred prescriptions       Selena Fine RN      "

## 2019-05-21 ENCOUNTER — MYC MEDICAL ADVICE (OUTPATIENT)
Dept: FAMILY MEDICINE | Facility: CLINIC | Age: 54
End: 2019-05-21

## 2019-05-21 DIAGNOSIS — I73.00 RAYNAUD'S DISEASE WITHOUT GANGRENE: ICD-10-CM

## 2019-05-21 DIAGNOSIS — I10 BENIGN ESSENTIAL HYPERTENSION: ICD-10-CM

## 2019-05-21 RX ORDER — NIFEDIPINE 60 MG/1
TABLET, EXTENDED RELEASE ORAL
Qty: 45 TABLET | Refills: 0 | Status: SHIPPED | OUTPATIENT
Start: 2019-05-21 | End: 2019-05-30

## 2019-05-21 NOTE — TELEPHONE ENCOUNTER
Please see message below.    BP Readings from Last 3 Encounters:   12/21/18 (!) 159/92   11/10/17 133/86   11/15/16 140/90       Reema Fulton RN [facility] Triage.

## 2019-05-30 RX ORDER — NIFEDIPINE 60 MG/1
TABLET, EXTENDED RELEASE ORAL
Qty: 90 TABLET | Refills: 0 | Status: SHIPPED | OUTPATIENT
Start: 2019-05-30 | End: 2019-09-17

## 2019-05-30 RX ORDER — LOSARTAN POTASSIUM 50 MG/1
50 TABLET ORAL DAILY
Qty: 90 TABLET | Refills: 0 | Status: SHIPPED | OUTPATIENT
Start: 2019-05-30 | End: 2019-11-15

## 2019-05-30 NOTE — TELEPHONE ENCOUNTER
I called home number, adult female says patient is out mowing the lawn.   I advised I'd send her a [employer] message if she would ask Renée to look at it later.  She agrees to do this.  [employer] message routed to patient with Dr. Alergia's plan and Rx's sent.    Teresita Calderón RN  [facility]

## 2019-05-30 NOTE — TELEPHONE ENCOUNTER
I sent in a 90 day supply but her blood pressure has not been ideal so we should add a 2nd blood pressure med also.  I sent in prescription for losartan.    Then patient can get her blood pressure checked at our pharmacy in 1-2 months to see if it is at goal.    Please inform patient.    Davidson lAegria MD

## 2019-07-18 ENCOUNTER — TELEPHONE (OUTPATIENT)
Dept: FAMILY MEDICINE | Facility: CLINIC | Age: 54
End: 2019-07-18

## 2019-07-18 NOTE — TELEPHONE ENCOUNTER
Panel Management Review      Patient has the following on her problem list:     Hypertension   Last three blood pressure readings:  BP Readings from Last 3 Encounters:   12/21/18 (!) 159/92   11/10/17 133/86   11/15/16 140/90     Blood pressure: FAILED    HTN Guidelines:  Less than 140/90      Composite cancer screening  Chart review shows that this patient is due/due soon for the following Pap Smear and Fecal Colorectal (FIT)  Summary:    Patient is due/failing the following:   BP CHECK, FIT and PAP    Action needed:   Patient needs nurse only appointment.    Type of outreach:    Sent letter.    Questions for provider review:    None                                                                                                                                    Lucero Gonzalez        Chart routed to n/a .

## 2019-07-18 NOTE — LETTER
July 18, 2019    Renée Mcfadden  [address] [facility] [address]    Dear Renée,    We care about your health and have reviewed your health plan. You are in particular need of attention regarding:    - Your High Blood Pressure, Please call to schedule an appointment with your provider or nurse    Here is a list of other Health Maintenance topics that are due now or due soon:  Health Maintenance Due   Topic Date Due     FIT  09/23/1974     HIV SCREENING  09/23/1979     ZOSTER IMMUNIZATION (1 of 2) 09/23/2014     PAP  05/10/2018     PHQ-2  01/01/2019       Please call us at [phone], or use [employer], to address the above recommendations.     Thank you for trusting [facility] with your healthcare needs.     Healthy Regards,    Your Care Team      (Team 1)

## 2019-09-09 ENCOUNTER — TRANSFERRED RECORDS (OUTPATIENT)
Dept: HEALTH INFORMATION MANAGEMENT | Facility: CLINIC | Age: 54
End: 2019-09-09

## 2019-09-09 LAB
CHOLEST SERPL-MCNC: 213 MG/DL (ref 125–199)
GLUCOSE SERPL-MCNC: 91 MG/DL (ref 70–99)
HDLC SERPL-MCNC: 80 MG/DL
LDLC SERPL CALC-MCNC: 131 MG/DL

## 2019-10-01 ENCOUNTER — HEALTH MAINTENANCE LETTER (OUTPATIENT)
Age: 54
End: 2019-10-01

## 2019-11-15 ENCOUNTER — OFFICE VISIT (OUTPATIENT)
Dept: FAMILY MEDICINE | Facility: CLINIC | Age: 54
End: 2019-11-15
Payer: COMMERCIAL

## 2019-11-15 VITALS
SYSTOLIC BLOOD PRESSURE: 136 MMHG | BODY MASS INDEX: 32.61 KG/M2 | WEIGHT: 191 LBS | HEIGHT: 64 IN | HEART RATE: 77 BPM | TEMPERATURE: 98.6 F | DIASTOLIC BLOOD PRESSURE: 80 MMHG

## 2019-11-15 DIAGNOSIS — I73.00 RAYNAUD'S DISEASE WITHOUT GANGRENE: ICD-10-CM

## 2019-11-15 DIAGNOSIS — Z12.11 SCREEN FOR COLON CANCER: ICD-10-CM

## 2019-11-15 DIAGNOSIS — Z91.89 PNEUMOCOCCAL VACCINATION INDICATED: ICD-10-CM

## 2019-11-15 DIAGNOSIS — B97.7 HPV IN FEMALE: ICD-10-CM

## 2019-11-15 DIAGNOSIS — I10 BENIGN ESSENTIAL HYPERTENSION: ICD-10-CM

## 2019-11-15 DIAGNOSIS — Z00.00 ENCOUNTER FOR PREVENTATIVE ADULT HEALTH CARE EXAMINATION: Primary | ICD-10-CM

## 2019-11-15 DIAGNOSIS — Z12.31 VISIT FOR SCREENING MAMMOGRAM: ICD-10-CM

## 2019-11-15 PROCEDURE — 90732 PPSV23 VACC 2 YRS+ SUBQ/IM: CPT | Performed by: FAMILY MEDICINE

## 2019-11-15 PROCEDURE — 99396 PREV VISIT EST AGE 40-64: CPT | Mod: 25 | Performed by: FAMILY MEDICINE

## 2019-11-15 PROCEDURE — 90471 IMMUNIZATION ADMIN: CPT | Performed by: FAMILY MEDICINE

## 2019-11-15 RX ORDER — NIFEDIPINE 60 MG/1
TABLET, EXTENDED RELEASE ORAL
Qty: 90 TABLET | Refills: 3 | Status: SHIPPED | OUTPATIENT
Start: 2019-11-15 | End: 2020-10-09

## 2019-11-15 ASSESSMENT — ENCOUNTER SYMPTOMS
DIARRHEA: 0
DIZZINESS: 0
CHILLS: 0
HEMATOCHEZIA: 0
ABDOMINAL PAIN: 0
CONSTIPATION: 0
EYE PAIN: 0
HEMATURIA: 0
FEVER: 0
NERVOUS/ANXIOUS: 1
FREQUENCY: 0
COUGH: 0

## 2019-11-15 ASSESSMENT — PAIN SCALES - GENERAL: PAINLEVEL: NO PAIN (0)

## 2019-11-15 ASSESSMENT — MIFFLIN-ST. JEOR: SCORE: 1442.88

## 2019-11-15 NOTE — PATIENT INSTRUCTIONS
Try to quit smoking entirely    Could use patch/ gum if desired ( no smoking while using these )    Keep working on healthy diet/exercise and weight loss    Advise seeing gynecology; call to schedule    Return the FIT test

## 2019-11-15 NOTE — PROGRESS NOTES
SUBJECTIVE:   CC: Renée Mcfadden is an 54 year old woman who presents for preventive health visit.     Healthy Habits:     Getting at least 3 servings of Calcium per day:  NO    Bi-annual eye exam:  NO    Dental care twice a year:  NO    Sleep apnea or symptoms of sleep apnea:  Daytime drowsiness and Excessive snoring    Diet:  Low salt, Low fat/cholesterol and Carbohydrate counting    Frequency of exercise:  2-3 days/week    Duration of exercise:  Less than 15 minutes    Taking medications regularly:  Yes    Medication side effects:  None    PHQ-2 Total Score: 2    Additional concerns today:  No      Less than 1 ppd     Has nicotine gum and patch at home        none    Today's PHQ-2 Score:   PHQ-2 ( 1999 Pfizer) 11/15/2019   Q1: Little interest or pleasure in doing things 1   Q2: Feeling down, depressed or hopeless 1   PHQ-2 Score 2   Q1: Little interest or pleasure in doing things Several days   Q2: Feeling down, depressed or hopeless Several days   PHQ-2 Score 2       Abuse: Current or Past(Physical, Sexual or Emotional)- No  Do you feel safe in your environment? Yes    Have you ever done Advance Care Planning? (For example, a Health Directive, POLST, or a discussion with a medical provider or your loved ones about your wishes): No, advance care planning information given to patient to review.  Patient declined advance care planning discussion at this time.    Social History     Tobacco Use     Smoking status: Current Every Day Smoker     Packs/day: 0.50     Years: 35.00     Pack years: 17.50     Types: Cigarettes     Start date: 1/1/1979     Smokeless tobacco: Never Used   Substance Use Topics     Alcohol use: Yes     Alcohol/week: 0.0 standard drinks     Comment: 3-4 beers weekly     If you drink alcohol do you typically have >3 drinks per day or >7 drinks per week? Yes      Alcohol Use 11/15/2019   Prescreen: >3 drinks/day or >7 drinks/week? No   Prescreen: >3 drinks/day or >7 drinks/week? -   No  "flowsheet data found.    Reviewed orders with patient.  Reviewed health maintenance and updated orders accordingly - Yes       Mammogram Screening: Patient over age 50, mutual decision to screen reflected in health maintenance.    Pertinent mammograms are reviewed under the imaging tab.  History of abnormal Pap smear: yes, long history of this  Had colposcopies done  Had endometriosis in past  PAP / HPV Latest Ref Rng & Units 11/10/2017   PAP - LSIL(A)   HPV 16 DNA NEG:Negative Positive(A)   HPV 18 DNA NEG:Negative Positive(A)   OTHER HR HPV NEG:Negative Negative     Reviewed and updated as needed this visit by clinical staff  Tobacco  Allergies  Meds  Med Hx  Surg Hx  Fam Hx  Soc Hx        Reviewed and updated as needed this visit by Provider            Review of Systems   Constitutional: Negative for chills and fever.   HENT: Negative for congestion and ear pain.    Eyes: Negative for pain.   Respiratory: Negative for cough.    Cardiovascular: Negative for chest pain.   Gastrointestinal: Negative for abdominal pain, constipation, diarrhea and hematochezia.   Genitourinary: Negative for frequency and hematuria.   Neurological: Negative for dizziness.   Psychiatric/Behavioral: The patient is nervous/anxious.      Had dequervains    Trying to be careful but on computer a lot    Shoulder not great    Has raynauds     On computer most of time    Just changed jobs    Now at "[employer]    Exercise mostly yardwork, snow removal etc            OBJECTIVE:   /80 (BP Location: Left arm, Patient Position: Chair, Cuff Size: Adult Regular)   Pulse 77   Temp 98.6  F (37  C) (Oral)   Ht 1.62 m (5' 3.78\")   Wt 86.6 kg (191 lb)   Breastfeeding No   BMI 33.01 kg/m    Physical Exam  GENERAL: healthy, alert and no distress  EYES: Eyes grossly normal to inspection, PERRL and conjunctivae and sclerae normal  HENT: ear canals and TM's normal, nose and mouth without ulcers or lesions  NECK: no adenopathy, no asymmetry, " masses, or scars and thyroid normal to palpation  RESP: lungs clear to auscultation - no rales, rhonchi or wheezes   CV: regular rate and rhythm, normal S1 S2, no S3 or S4, no murmur, click or rub, no peripheral edema and peripheral pulses strong  ABDOMEN: soft, nontender, no hepatosplenomegaly, no masses and bowel sounds normal  MS: no gross musculoskeletal defects noted, no edema  SKIN: no suspicious lesions or rashes  NEURO: Normal strength and tone, mentation intact and speech normal  PSYCH: mentation appears normal, affect normal/bright    Diagnostic Test Results:  Labs reviewed in Epic    ASSESSMENT/PLAN:   Renée was seen today for physical and health maintenance.    Diagnoses and all orders for this visit:    Encounter for preventative adult health care examination    Screen for colon cancer  -     Fecal colorectal cancer screen (FIT); Future    Visit for screening mammogram  -     *MA Screening Digital Bilateral; Future    Pneumococcal vaccination indicated  -     VACCINE ADMINISTRATION, INITIAL  -     PNEUMOCOCCAL VACCINE,ADULT,SQ OR IM  -     SCREENING QUESTIONS FOR ADULT IMMUNIZATIONS    Benign essential hypertension  -     NIFEdipine ER OSMOTIC (PROCARDIA XL) 60 MG 24 hr tablet; TAKE 1 TABLET DAILY    Raynaud's disease without gangrene  -     NIFEdipine ER OSMOTIC (PROCARDIA XL) 60 MG 24 hr tablet; TAKE 1 TABLET DAILY    HPV in female  -     OB/GYN REFERRAL    Discussed multiple issues with patient  With the history of multiple cervix procedures and the pos hpv 2 years ago ( failed to follow up at that time) prudent to see gynecology. Patient to call and schedule.  Fit test   Refill nefedipine  Patient to call and schedule mammogram  Strongly advised smoking cessation    COUNSELING:  Reviewed preventive health counseling, as reflected in patient instructions       Regular exercise       Healthy diet/nutrition       Vision screening       Safe sex practices/STD prevention       Colon cancer  "screening    Estimated body mass index is 33.01 kg/m  as calculated from the following:    Height as of this encounter: 1.62 m (5' 3.78\").    Weight as of this encounter: 86.6 kg (191 lb).    Weight management plan: Discussed healthy diet and exercise guidelines     reports that she has been smoking cigarettes. She started smoking about 40 years ago. She has a 17.50 pack-year smoking history. She has never used smokeless tobacco.  Tobacco Cessation Action Plan: Pharmacotherapies : Nicotine patch and other Nicotine replacement    Counseling Resources:  ATP IV Guidelines  Pooled Cohorts Equation Calculator  Breast Cancer Risk Calculator  FRAX Risk Assessment  ICSI Preventive Guidelines  Dietary Guidelines for Americans, 2010  USDA's MyPlate  ASA Prophylaxis  Lung CA Screening    Davidson Alegria MD  [facility]  "

## 2020-03-22 ENCOUNTER — HEALTH MAINTENANCE LETTER (OUTPATIENT)
Age: 55
End: 2020-03-22

## 2020-10-09 ENCOUNTER — NURSE TRIAGE (OUTPATIENT)
Dept: NURSING | Facility: CLINIC | Age: 55
End: 2020-10-09

## 2020-10-09 ENCOUNTER — ANCILLARY PROCEDURE (OUTPATIENT)
Dept: ULTRASOUND IMAGING | Facility: CLINIC | Age: 55
End: 2020-10-09
Attending: PHYSICIAN ASSISTANT
Payer: COMMERCIAL

## 2020-10-09 ENCOUNTER — TELEPHONE (OUTPATIENT)
Dept: FAMILY MEDICINE | Facility: CLINIC | Age: 55
End: 2020-10-09

## 2020-10-09 ENCOUNTER — OFFICE VISIT (OUTPATIENT)
Dept: FAMILY MEDICINE | Facility: CLINIC | Age: 55
End: 2020-10-09
Payer: COMMERCIAL

## 2020-10-09 VITALS
TEMPERATURE: 98.6 F | HEIGHT: 65 IN | BODY MASS INDEX: 30.97 KG/M2 | WEIGHT: 185.9 LBS | HEART RATE: 103 BPM | DIASTOLIC BLOOD PRESSURE: 98 MMHG | SYSTOLIC BLOOD PRESSURE: 157 MMHG

## 2020-10-09 DIAGNOSIS — M79.662 PAIN OF LEFT LOWER LEG: ICD-10-CM

## 2020-10-09 DIAGNOSIS — M79.89 LEFT LEG SWELLING: ICD-10-CM

## 2020-10-09 DIAGNOSIS — I82.4Y9 DEEP VEIN THROMBOSIS (DVT) OF PROXIMAL LOWER EXTREMITY, UNSPECIFIED CHRONICITY, UNSPECIFIED LATERALITY (H): Primary | ICD-10-CM

## 2020-10-09 DIAGNOSIS — I10 BENIGN ESSENTIAL HYPERTENSION: Primary | ICD-10-CM

## 2020-10-09 DIAGNOSIS — F17.200 SMOKER: ICD-10-CM

## 2020-10-09 PROCEDURE — 99215 OFFICE O/P EST HI 40 MIN: CPT | Performed by: PHYSICIAN ASSISTANT

## 2020-10-09 RX ORDER — NIFEDIPINE 60 MG/1
TABLET, EXTENDED RELEASE ORAL
Qty: 90 TABLET | Refills: 3 | Status: SHIPPED | OUTPATIENT
Start: 2020-10-09 | End: 2021-01-12

## 2020-10-09 ASSESSMENT — MIFFLIN-ST. JEOR: SCORE: 1429.73

## 2020-10-09 ASSESSMENT — PAIN SCALES - GENERAL: PAINLEVEL: SEVERE PAIN (7)

## 2020-10-09 NOTE — PROGRESS NOTES
"Subjective     Renée Mcfadden is a 55 year old female who presents to clinic today for the following health issues:    HPI         Musculoskeletal problem/pain  Onset/Duration: x2 weeks ago  Description  Location: leg - left  Joint Swelling: YES  Redness: YES  Pain: YES  Warmth: YES- a little  Intensity:  mild, moderate, severe  Progression of Symptoms:  worsening  Accompanying signs and symptoms:   Fevers: no  Numbness/tingling/weakness: YES- numbness and tingling- sometimes  History  Trauma to the area: no  Recent illness:  no  Previous similar problem: YES- several years ago  Previous evaluation:  YES  Precipitating or alleviating factors:  Aggravating factors include: sitting,   Therapies tried and outcome: rest/inactivity and ibuprofen     Works 1-12 hours per day on the computer. Wondering if it is from sitting. Swelling started first then the pain.   Swelling better in the morning after sleeping and laying down.   Swelling mostly from the knee down.   Only on the left leg.   Had a DVT after vein striping and ablation.     Did not take her nifedipine this morning.   No chest pain, no shortness of breath    Review of Systems   Constitutional, HEENT, cardiovascular, pulmonary, gi and gu systems are negative, except as otherwise noted.      Objective    BP (!) 157/98 (BP Location: Left arm, Patient Position: Chair, Cuff Size: Adult Large)   Pulse 103   Temp 98.6  F (37  C) (Oral)   Ht 1.644 m (5' 4.72\")   Wt 84.3 kg (185 lb 14.4 oz)   BMI 31.20 kg/m    Body mass index is 31.2 kg/m .  Physical Exam   GENERAL: healthy, alert and no distress  RESP: lungs clear to auscultation - no rales, rhonchi or wheezes  CV: regular rate and rhythm, normal S1 S2, no S3 or S4, no murmur, click or rub, MS: no gross musculoskeletal defects noted, Left calf with pain to palpation in the upper 1/2, tightness to the calf muscle. 1+ pitting edema on the left tibia. full range of motion of the left leg. No redness or warmth " appreciated.   NEURO: Normal strength and tone, mentation intact and speech normal          Assessment & Plan     Benign essential hypertension  Not controlled, but did not take meds today. Needed refills.   - NIFEdipine ER OSMOTIC (PROCARDIA XL) 60 MG 24 hr tablet; TAKE 1 TABLET DAILY    Smoker  Encouraged cessation    Pain of left lower leg  Concern for possible DVT. Stat ultrasound today. If positive patient would be good candidate for Eliquis or similar.   - US Lower Extremity Venous Duplex Left; Future    Left leg swelling  - US Lower Extremity Venous Duplex Left; Future   No follow-ups on file.    Sena Sage PA-C  [facility]

## 2020-10-09 NOTE — TELEPHONE ENCOUNTER
I called and spoke to patient, advised her of Rx sent and advice.  Dr. Alegria is her PCP per her report, scheduled follow up with him on 10/22/20.    To ER if having chest pain or shortness of breath, call if concerns sooner.    Patient verbalized understanding of and agreement with plan.    Teresita Calderón RN  [facility]

## 2020-10-09 NOTE — TELEPHONE ENCOUNTER
I sent in the starter pack prescription for  eliquis to her pharmacy.  Start this tonight.  Stay well hydrated.    Follow up in clinic in 1-2 weeks.    Call or be seen sooner if needed.    Please inform patient    Davidson Alegria MD

## 2020-10-09 NOTE — TELEPHONE ENCOUNTER
Renée calls and says that she had an ultrasound done today and the ultrasound shows that she has a DVT in her left, lower leg. Pt. Says that she went to  her Eliquis prescription at her [facility] and that pharmacy told pt. That they will not have this medication until Monday. Pt. Says that she has not called other [email or web address] to see if they have Eliquis but will do this now. Pt. Says that if she cannot find a [facility] that has Eliquis, she will call back and have her Eliquis prescription called to a [facility] pharmacy. COVID 19 Nurse Triage Plan/Patient Instructions    Please be aware that novel coronavirus (COVID-19) may be circulating in the community. If you develop symptoms such as fever, cough, or SOB or if you have concerns about the presence of another infection including coronavirus (COVID-19), please contact your health care provider or visit [email or web address].     Disposition/Instructions    Home care recommended. Follow home care protocol based instructions.    Thank you for taking steps to prevent the spread of this virus.  o Limit your contact with others.  o Wear a simple mask to cover your cough.  o Wash your hands well and often.    Resources    [facility]: About COVID-19: [email or web address]    CDC: What to Do If You're Sick: www.cdc.gov/coronavirus/2019-ncov/about/steps-when-sick.html    CDC: Ending Home Isolation: www.cdc.gov/coronavirus/2019-ncov/hcp/disposition-in-home-patients.html     CDC: Caring for Someone: www.cdc.gov/coronavirus/2019-ncov/if-you-are-sick/care-for-someone.html     [facility]: Interim Guidance for Hospital Discharge to Home: www.health.[facility].[place]/diseases/coronavirus/hcp/hospdischarge.pdf    [facility] clinical trials (COVID-19 research studies): clinicalaffairs.[facility]/umn-clinical-trials     Below are the COVID-19 hotlines at the [place] Department of Health ([facility]). Interpreters are available.   o For [employer]  questions: Call 740-234-1397 or 1-954.690.8520 (7 a.m. to 7 p.m.)  o For questions about schools and childcare: Call 694-384-8411 or 1-427.831.5864 (7 a.m. to 7 p.m.)                     Additional Information    Negative: RN needs further essential information from caller in order to complete triage    Negative: [1] Caller is not with the adult (patient) AND [2] reporting urgent symptoms    Negative: Lab result questions    Negative: Medication questions    Negative: Caller can't be reached by phone    Negative: Caller has already spoken to PCP or another triager    Negative: Requesting regular office appointment    Negative: [1] Caller requesting NON-URGENT health information AND [2] PCP's office is the best resource    Negative: Health Information question, no triage required and triager able to answer question    Negative: General information question, no triage required and triager able to answer question    Negative: Question about upcoming scheduled test, no triage required and triager able to answer question    Negative: [1] Caller is not with the adult (patient) AND [2] probable NON-URGENT symptoms    [1] Follow-up call to recent contact AND [2] information only call, no triage required    Protocols used: INFORMATION ONLY CALL-A-

## 2020-10-09 NOTE — TELEPHONE ENCOUNTER
Red flag call taken, Juany states patient has positive DVT left lower extremity.     She cannot leave the imaging waiting room until she has been contacted with plan for this.  Waiting room phone number is [phone]; patient is sitting next to phone and knows to answer if it rings.    Patient was seen by Sena Sage today; Sena is gone for the day.      See plan:   Pain of left lower leg  Concern for possible DVT. Stat ultrasound today. If positive patient would be good candidate for Eliquis or similar.   - US Lower Extremity Venous Duplex Left; Future      Routed high priority to Dr. Alegria as covering provider to address.    Teresita Calderón RN  [facility]

## 2020-10-10 NOTE — TELEPHONE ENCOUNTER
"Pt reports \"diagnosed with DVT LLE today\". Eliquis starter pack prescribed. [facility] or any [facility] does not have per pt. Per pt \"need prior authorization anyway\". Pt states she will see if pharmacist can give her enough to last until prior authorization can be done, otherwise \"will take my chances\". Pt states pharmacist told her \"nobody has it\" in terms of \"starter pack\" for Eliquis but pharmacist will make her a \"bridge starter pack\".     Advised pt to call back with any further concerns or questions.    Pt verbalizes understanding and agrees to plan.     Additional Information    Caller has already spoken to PCP or another triager    Caller has already spoken with another triager or PCP AND has further questions AND triager able to answer questions.    Protocols used: INFORMATION ONLY CALL-A-AH, NO CONTACT OR DUPLICATE CONTACT CALL-A-AH      "

## 2020-10-12 ENCOUNTER — TELEPHONE (OUTPATIENT)
Dept: FAMILY MEDICINE | Facility: CLINIC | Age: 55
End: 2020-10-12

## 2020-10-12 DIAGNOSIS — I82.4Y9 DEEP VEIN THROMBOSIS (DVT) OF PROXIMAL LOWER EXTREMITY, UNSPECIFIED CHRONICITY, UNSPECIFIED LATERALITY (H): Primary | ICD-10-CM

## 2020-10-12 NOTE — TELEPHONE ENCOUNTER
RN spoke with patient.     She states she believes that Eliquis will not be covered at the 5mg dose, though states it would be worth trying to send in an Rx. She's also open to the lovenox/warfarin combination.     Routing to PCP - send both alternatives to see if covered?     Team will again attempt to talk with [employer]/pharmacy tomorrow to determine covered alternatives, but patient needs something in meantime. Is currently out of medication.       Lynn Mcgovern, RN, BSN, PHN  [facility] [place]

## 2020-10-12 NOTE — TELEPHONE ENCOUNTER
Patient stated that medication needs prior auth through [employer] [facility], and the local pharmacies do not heron this starter pack and they would have to order this in. Patient paid $117 for 12 pills to get her by.    Patient is wondering if there is something else that can be sent in for her to take while she waits for the prior authorization for this from mail order. She stated that [employer] [facility] has to agree to pay for the alternative at a local pharmacy.       Called [employer] [facility], they are having computer issues. Been waiting 20 min for her to figure out her system issues. Transferred to another associate she stated I need to talk to the PA people and will transfer me. Nurse has been on hold for over greater than 10 min. Representative could not tell me a covered alternative only that a PA is needed for the medication that was sent in.     Nurse has been on this call for >45 min with no guidance as to what will be covered for patient or how to get this PA approved. An hour on hold nurse advised that something needs to be done and we need to know what will be covered at a local pharmacy to get patient through until the PA is approved. Call disconnected at > 60 mins.     Routed to provider in clinic to see what they recommend as an alternative for patient to take as she will be out of medication today.     Thank you.   Selena Fine RN

## 2020-10-12 NOTE — TELEPHONE ENCOUNTER
Reason for Call:  Medication or medication refill:    Do you use a [facility]?  Name of the pharmacy and phone number for the current request:  [id number] - [place], [place] - [address] [place] [address] RD [address] AT [facility] [address]   Phone:  [phone]   Fax:  [phone]     This is for immediate refill at [facility] and [facility] is for long term.[facility] - [address]   Phone:  [phone]   Fax:  [phone]         Name of the medication requested: Eliquis    Other request: PT states would like a call and needs as soon as possible and unable to pay out of pocket.    Can we leave a detailed message on this number? YES    Phone number patient can be reached at: Home number on file [phone] (home)    Best Time: Anytime    Call taken on 10/12/2020 at 3:36 PM by Katherine Medel

## 2020-10-12 NOTE — TELEPHONE ENCOUNTER
Our only other option would be lovenox and warfarin. Which may also need a PA.   If needed I can write for the 5 mg dose outside of the starter pack?  Sena Sage PA-C

## 2020-10-13 PROBLEM — I82.4Y9 DEEP VEIN THROMBOSIS (DVT) OF PROXIMAL LOWER EXTREMITY, UNSPECIFIED CHRONICITY, UNSPECIFIED LATERALITY (H): Status: ACTIVE | Noted: 2020-10-13

## 2020-10-13 RX ORDER — WARFARIN SODIUM 5 MG/1
5 TABLET ORAL DAILY
Qty: 30 TABLET | Refills: 0 | Status: SHIPPED | OUTPATIENT
Start: 2020-10-13 | End: 2020-10-22 | Stop reason: ALTCHOICE

## 2020-10-13 NOTE — TELEPHONE ENCOUNTER
Attempted to call pharmacy to see if 5 mg eliquis covered as sent this AM, pharmacy closed until 9 am.  Call later, giving them some time to process this.    Teresita Calderón RN  [facility]

## 2020-10-13 NOTE — TELEPHONE ENCOUNTER
We can try a PA. I just know that even a stat PA can be a lengthy process and patient may be without medication for up to 1-2 weeks.   We have been having significant difficulty with her insurance so I am not optimistic this will be processed quickly.   If patient is willing she could start only the lovenox until we get the PA approved or denied for the Eliquis.   Sena Sage PA-C      no

## 2020-10-13 NOTE — TELEPHONE ENCOUNTER
I believe she told me she missed some doses, has 4 of the 5 mg tabs left so only has 2 more full 10 mg doses.    I will have her take 2 tabs tonight and 2 tabs in the AM.  Does she start the lovenox tomorrow afternoon then?    Also, again, please advise on any activity or lifting restrictions regarding the DVT.    Routed back to Sena Sage to address.    Teresita Calderón RN  [facility]

## 2020-10-13 NOTE — TELEPHONE ENCOUNTER
Prior Authorization Approval    Authorization Effective Date: 9/13/2020  Authorization Expiration Date: 10/13/2021  Medication: eliquis-APPROVED  Approved Dose/Quantity:   Reference #:     Insurance Company: [employer] - Phone [phone] Fax [phone]  Expected CoPay:       CoPay Card Available:      Foundation Assistance Needed:    Which Pharmacy is filling the prescription (Not needed for infusion/clinic administered): [id number] - [place], [place] - [address] AT [facility] [address] [facility]  Pharmacy Notified: Yes  Patient Notified: No    Pharmacy will notify patient when medication is ready.

## 2020-10-13 NOTE — TELEPHONE ENCOUNTER
She should avoid strenuous exercise and can do daily activities.   Yes can start lovenox tomorrow afternoon.   Sena Sage PA-C

## 2020-10-13 NOTE — TELEPHONE ENCOUNTER
I called and spoke to patient, she will finish out her remaining eliquis and start the lovenox while we start PA but wants to know, should she just take 5 mg eliquis BId until gone or take 2 tabs BID and then start lovenox?     Also, still wonders about any activity restrictions or cautions regarding the DVT.    Routed back to PCP, will route to [facility] PA pool after questions addressed.    Teresita Calderón RN  [facility]

## 2020-10-13 NOTE — TELEPHONE ENCOUNTER
She should start the lovenox today, likely only 1 dose by the time she gets it. Then BID tomorrow and start warfarin tomorrow night. She needs an appointment with INR clinic in 3 days for initial consultation.   Sena Sage PA-C

## 2020-10-13 NOTE — TELEPHONE ENCOUNTER
I called and spoke to patient, she is not too happy about having to do warfarin and the costs associated with the labs and visits; she wants to know billing codes so she can get an estimate.  Advised she call billing.   She wonders why a PA can't be tried?   She has 4 tabs of the eliquis left (5 mg).   Original dosing was 2 tabs BID, if she takes 1 tab BID that buys us another day and a half, can we try PA?    Also, she wants to know what physical restrictions she has; can she mow her lawn?   Any lifting restrictions in regards to her leg with clot which is sore and swollen?    If PA advised, will route to [facility] PA team.    Routed to Sena Sage to advise.    Teresita Calderón RN  [facility]

## 2020-10-13 NOTE — TELEPHONE ENCOUNTER
Call was placed to pt to let her know that the PA was approved and requested her to  from pharmacy. [facility] will follow up tomorrow to ensure a safe anticoagulation plan and she didn't have any difficulty with picking up Eliquis.     Stephan Addison, Anticoagulation RN

## 2020-10-13 NOTE — TELEPHONE ENCOUNTER
Central Prior Authorization Team   Phone: [phone]      PA Initiation    Medication: eliquis-Initiated  Insurance Company: [employer] - Phone [phone] Fax [phone]  Pharmacy Filling the Rx: [facility] #[id number] - [place], [place] - [address] AT [facility] OF [address]  Filling Pharmacy Phone: [phone]  Filling Pharmacy Fax:    Start Date: 10/13/2020

## 2020-10-13 NOTE — TELEPHONE ENCOUNTER
"I called and spoke to Yolanda, the eliquis Rx received today still requires PA.       She ran a trial of lovenox (not knowing the official dose) and found it is covered but a 5 day supply (10 syringes) has a copay of $130.    I asked her to \"run\" warfarin.   That is covered, will be about $8 for a month.    Routed back to Sena Sage to advise.    Teresita Calderón RN  [facility]      "

## 2020-10-13 NOTE — TELEPHONE ENCOUNTER
I called and spoke to patient, advised her of activity advice.   She will take 10 mg eliquis tonight and 10 mg in the AM and start the lovenox tomorrow evening and will plan to stay on this until PA addressed for the eliquis.    She will not  the warfarin until we find out if eliquis PA will be approved or not.    Routed to [facility] PA pool to initiate PA for eliquis 5 mg tabs.    Teresita Calderón RN  [facility]

## 2020-10-13 NOTE — TELEPHONE ENCOUNTER
Central PA team member sent me instant message, can't start PA as provider discontinued both of the eliquis Rx's.    Routed to Sena Sage to re-order.    Teresita Calderón RN  [facility]

## 2020-10-13 NOTE — TELEPHONE ENCOUNTER
Let's start with the 5mg dose and I will write for lovenox if not covered. If needing lovenox will need INR clinic etc.   Sena Sage PA-C

## 2020-10-13 NOTE — TELEPHONE ENCOUNTER
I am sorry, in this chain of calls I had assumed she was out of medication at this time. She should take 10mg 2 times a day for 7 days if possible. I thought I had read that she only has 12 pills which only would last her 3 days and she should be out by now?   Sena Sage PA-C

## 2020-10-14 NOTE — TELEPHONE ENCOUNTER
Received a call from Renée this morning to confirm that she picked up her Eliquis last night. She expressed concerns about the ongoing cost of the medication. Gave her information on assistance with co-pay programs through the Eliquis  at their web site or via telephone at [phone].   Pt stated that she will not be starting the Lovenox or warfarin due to obtaining Eliquis. Will discharge her from the [facility] program.     Thank you,    Stephan Addison, Anticoagulation RN

## 2020-10-22 ENCOUNTER — OFFICE VISIT (OUTPATIENT)
Dept: FAMILY MEDICINE | Facility: CLINIC | Age: 55
End: 2020-10-22
Payer: COMMERCIAL

## 2020-10-22 VITALS
WEIGHT: 186 LBS | TEMPERATURE: 97.9 F | BODY MASS INDEX: 31.22 KG/M2 | HEART RATE: 83 BPM | DIASTOLIC BLOOD PRESSURE: 85 MMHG | OXYGEN SATURATION: 97 % | SYSTOLIC BLOOD PRESSURE: 135 MMHG

## 2020-10-22 DIAGNOSIS — I82.4Y2 DEEP VEIN THROMBOSIS (DVT) OF PROXIMAL VEIN OF LEFT LOWER EXTREMITY, UNSPECIFIED CHRONICITY (H): Primary | ICD-10-CM

## 2020-10-22 DIAGNOSIS — F17.200 SMOKER: ICD-10-CM

## 2020-10-22 DIAGNOSIS — I10 BENIGN ESSENTIAL HYPERTENSION: ICD-10-CM

## 2020-10-22 PROCEDURE — 99213 OFFICE O/P EST LOW 20 MIN: CPT | Performed by: FAMILY MEDICINE

## 2020-10-22 RX ORDER — APIXABAN 5 MG/1
5 TABLET, FILM COATED ORAL 2 TIMES DAILY
COMMUNITY
Start: 2020-10-13 | End: 2020-10-22

## 2020-10-22 ASSESSMENT — PAIN SCALES - GENERAL: PAINLEVEL: NO PAIN (0)

## 2020-10-22 NOTE — PATIENT INSTRUCTIONS
Talk with pharmacist to see if one  Blood thinner more affordable    Smoking cessation    Increase walking/ exercise    Stay well hydrated    Get up and  Walk frequently    See us in a couple months, sooner if needed

## 2020-10-22 NOTE — LETTER
[address] [address] [phone]             2020    To whom it may concern:    Renée Mcfadden  63 is a patient of our clinic who just had a blood clot in the leg.  Advise she  Get up and walk for 10 minutes every hour.              Sincerely,                   Davidson Alegria MD

## 2020-10-22 NOTE — PROGRESS NOTES
Subjective     Renée Mcfadden is a 55 year old female who presents to clinic today for the following health issues:    HPI         Follow up on blood clot       Review of Systems    patient had some hassles with the eliquis    Working from home for 7 months    Smoking some          Objective    /85 (BP Location: Left arm, Patient Position: Chair, Cuff Size: Adult Regular)   Pulse 83   Temp 97.9  F (36.6  C) (Oral)   Wt 84.4 kg (186 lb)   SpO2 97%   Breastfeeding No   BMI 31.22 kg/m    Body mass index is 31.22 kg/m .  Physical Exam  Constitutional:       Appearance: She is well-developed.   HENT:      Head: Normocephalic and atraumatic.   Eyes:      Conjunctiva/sclera: Conjunctivae normal.   Neck:      Vascular: No carotid bruit.   Cardiovascular:      Rate and Rhythm: Normal rate and regular rhythm.      Heart sounds: Normal heart sounds.   Pulmonary:      Effort: Pulmonary effort is normal. No respiratory distress.      Breath sounds: Normal breath sounds.   Neurological:      Mental Status: She is alert and oriented to person, place, and time.      left lower leg edema, per patient it is a bit better than when she  Was in last week    Sensation and range of motion okay in feet / toes                     ASSESSMENT / PLAN:  (I82.4Y2) Deep vein thrombosis (DVT) of proximal vein of left lower extremity, unspecified chronicity (H)  (primary encounter diagnosis)  Comment: patient is tolerating the  eliquis okay but it is very expensive.  I gave her written prescriptions for this and another common med in this class.  She will show them to pharmacist and see if one is cheaper.  Fill  That one.  Call with problems.    Plan: rivaroxaban ANTICOAGULANT (XARELTO) 20 MG TABS         tablet, apixaban ANTICOAGULANT (ELIQUIS) 5 MG         tablet        Advised more walking/ activity and more hydration.    (F17.200) Smoker  Comment: discussed   Plan: strongly advise  Complete cessation     (I10) Benign essential  hypertension  Comment: at goal   Plan: no change    See us in a couple months, sooner if needed     Be seen promptly if symptoms acutely worsen     Patient should be on blood thinner  For at least  6 months.  Maybe  Permanently since this is 2nd episode in life.      I reviewed the patient's medications, allergies, medical history, family history, and social history.    Davidson Alegria MD

## 2020-11-11 ENCOUNTER — TELEPHONE (OUTPATIENT)
Dept: FAMILY MEDICINE | Facility: CLINIC | Age: 55
End: 2020-11-11

## 2020-11-11 NOTE — TELEPHONE ENCOUNTER
Prior Authorization Retail Medication Request    Medication/Dose: Xarelto  ICD code (if different than what is on RX):    Previously Tried and Failed:    Rationale:      Insurance Name:  [employer]   Insurance ID:  [id number]      Pharmacy Information (if different than what is on RX)  Name:  [facility]   Phone:  [phone]

## 2020-11-12 NOTE — TELEPHONE ENCOUNTER
Central Prior Authorization Team   Phone: [phone]    PA Initiation    Medication: Xarelto  Insurance Company: [employer] - Phone [phone] Fax [phone]  Pharmacy Filling the Rx: [id number] - [place], [place] - [address] AT [facility] [address]  Filling Pharmacy Phone: [phone]  Filling Pharmacy Fax: [phone]  Start Date: 11/12/2020

## 2020-11-13 NOTE — TELEPHONE ENCOUNTER
Prior Authorization Approval    Authorization Effective Date: 10/13/2020  Authorization Expiration Date: 11/12/2021  Medication: Xarelto-APPROVED  Approved Dose/Quantity:    Reference #:     Insurance Company: [employer] - Phone [phone] Fax [phone]  Expected CoPay:       CoPay Card Available:      Foundation Assistance Needed:    Which Pharmacy is filling the prescription (Not needed for infusion/clinic administered): [id number] - [place], [place] - [address] [place] [address] NE AT [facility] [address] [facility]  Pharmacy Notified: Yes  Patient Notified: Yes  **Instructed pharmacy to notify patient when script is ready to /ship.**

## 2020-11-16 ENCOUNTER — TELEPHONE (OUTPATIENT)
Dept: FAMILY MEDICINE | Facility: CLINIC | Age: 55
End: 2020-11-16

## 2020-11-16 DIAGNOSIS — Z86.718 PERSONAL HISTORY OF DVT (DEEP VEIN THROMBOSIS): Primary | ICD-10-CM

## 2020-11-16 RX ORDER — WARFARIN SODIUM 5 MG/1
TABLET ORAL
Qty: 30 TABLET | Refills: 1 | Status: SHIPPED | OUTPATIENT
Start: 2020-11-16 | End: 2021-01-12

## 2020-11-16 NOTE — TELEPHONE ENCOUNTER
Patient is requesting to change eliquis and xarelto to warfin or coumadin. She stated that insurance only covers for partial and she cannot afford it. Please advise.

## 2020-11-16 NOTE — TELEPHONE ENCOUNTER
Routed to [facility] to reach out to patient regarding coumadin start.    Teresita Calderón RN  [facility]

## 2020-11-16 NOTE — TELEPHONE ENCOUNTER
Patient was seen 10/22/20 by Dr. Alegria.    She was to shop around with written Rx's for both eliquis and xarelto and see which was cheaper and fill the cheaper one.    It appears PA was approved for eliquis in October.    I called and spoke to patient, she says even with the PA her cost is $400 for a month of eliquis.   She has some left but would rather switch to coumadin and deal with the INR clinic instead.    States she can't afford the cost of this med on top of what she owes the clinic already.    Pharmacy cued, routed to Dr. Alegria to advise.   Will need new anticoag clinic referral, I see the Oct 12 one has been discontinued.    Teresita Calderón RN  [facility]

## 2020-11-16 NOTE — TELEPHONE ENCOUNTER
I did referral for INR clinic.  Patient should see INR  Nurse who can help her get started on the warfarin.  I  Did send in prescription for warfarin but INR nurse can help her get started and manage it.  Stop the other  Prescription blood thinner when starting warfarin.    Please inform patient.    Davidson Alegria MD

## 2020-11-17 NOTE — TELEPHONE ENCOUNTER
Patient to start warfarin the day after she takes her last Eliquis. She thinks her last day will be the 21st so will start Warfarin on the 22nd and come in for INR on 11/25. Advised if she starts warfarin later than the 22nd to reschedule her lab for 3-4 days after she takes her 1st dose.  Patient verbalized understanding and has no further questions or concerns.      Farrah Lopez RN - [facility]

## 2020-11-30 ENCOUNTER — TRANSFERRED RECORDS (OUTPATIENT)
Dept: HEALTH INFORMATION MANAGEMENT | Facility: CLINIC | Age: 55
End: 2020-11-30

## 2020-12-02 ENCOUNTER — ANTICOAGULATION THERAPY VISIT (OUTPATIENT)
Dept: NURSING | Facility: CLINIC | Age: 55
End: 2020-12-02

## 2020-12-02 DIAGNOSIS — Z86.718 PERSONAL HISTORY OF DVT (DEEP VEIN THROMBOSIS): ICD-10-CM

## 2020-12-02 LAB
CAPILLARY BLOOD COLLECTION: NORMAL
INR PPP: 1.8 (ref 0.86–1.14)

## 2020-12-02 PROCEDURE — 36416 COLLJ CAPILLARY BLOOD SPEC: CPT | Performed by: FAMILY MEDICINE

## 2020-12-02 PROCEDURE — 85610 PROTHROMBIN TIME: CPT | Performed by: FAMILY MEDICINE

## 2020-12-02 NOTE — PROGRESS NOTES
ANTICOAGULATION MANAGEMENT     Patient Name:  Renée Mcfadden  Date:  2020    ASSESSMENT /SUBJECTIVE:    Today's INR result of 1.8 is subtherapeutic. Goal INR of 2.0-3.0      Warfarin dose taken: Warfarin taken as instructed    Diet: No new diet changes affecting INR    Medication changes/ interactions: No interaction expected between clindamycin and warfarin    Previous INR: NA    S/S of bleeding or thromboembolism: No    New injury or illness: Yes: Cellulits, osteomyelitis    Upcoming surgery, procedure or cardioversion: No    Additional findings: None      PLAN:    Telephone call with Renée regarding INR result and instructed:     Warfarin Dosing Instructions: Change your warfarin dose to 7.5 mg Wed and 5 mg ROW    Instructed patient to follow up no later than: 4 days  Lab visit scheduled    Education provided: Target INR goal and significance of current INR result      Renée verbalizes understanding and agrees to warfarin dosing plan.    Instructed to call the Anticoagulation Clinic for any changes, questions or concerns. (#[phone])        Farrah Lopez RN      OBJECTIVE:  Recent labs: (last 7 days)     20  1510   INR 1.80*         No question data found.  Anticoagulation Summary  As of 2020    INR goal:  2.0-3.0   TTR:  --   INR used for dosin.80 (2020)   Warfarin maintenance plan:  5 mg (5 mg x 1) every day   Full warfarin instructions:  5 mg every day   Weekly warfarin total:  35 mg   Plan last modified:  Farrah Lopez, RN (2020)   Next INR check:     Target end date:  2021    Indications    Personal history of DVT (deep vein thrombosis) [Z86.718]             Anticoagulation Episode Summary     INR check location:      Preferred lab:      Send INR reminders to:  [facility]    Comments:        Anticoagulation Care Providers     Provider Role Specialty Phone number    Davidson Alegria MD Referring Family Medicine [phone]

## 2020-12-07 ENCOUNTER — ANTICOAGULATION THERAPY VISIT (OUTPATIENT)
Dept: NURSING | Facility: CLINIC | Age: 55
End: 2020-12-07

## 2020-12-07 DIAGNOSIS — Z86.718 PERSONAL HISTORY OF DVT (DEEP VEIN THROMBOSIS): ICD-10-CM

## 2020-12-07 LAB
CAPILLARY BLOOD COLLECTION: NORMAL
INR PPP: 1.7 (ref 0.86–1.14)

## 2020-12-07 PROCEDURE — 85610 PROTHROMBIN TIME: CPT | Performed by: FAMILY MEDICINE

## 2020-12-07 PROCEDURE — 36416 COLLJ CAPILLARY BLOOD SPEC: CPT | Performed by: FAMILY MEDICINE

## 2020-12-07 NOTE — PROGRESS NOTES
ANTICOAGULATION MANAGEMENT     Patient Name:  Renée Mcfadden  Date:  12/7/2020    ASSESSMENT /SUBJECTIVE:    Today's INR result of 1.70 is subtherapeutic. Goal INR of 2.0-3.0      Warfarin dose taken: Warfarin taken as instructed    Diet: No new diet changes affecting INR    Medication changes/ interactions: No new medications/supplements affecting INR    Previous INR: Subtherapeutic     S/S of bleeding or thromboembolism: No    New injury or illness: No    Upcoming surgery, procedure or cardioversion: No    Additional findings: Currently taking clindamycin, has N/V as a result. Having some pain r/t rayauds and taking acetaminophen.        PLAN:    Telephone call with Renée regarding INR result and instructed:     Warfarin Dosing Instructions: 10mg tonight then continue your current warfarin dose of 7.5mg every Wed; 5mg all other days of the week.    Instructed patient to follow up no later than: 1 week  Lab visit scheduled    Education provided: Target INR goal and significance of current INR result, Importance of following up for INR monitoring at instructed interval, Importance of taking warfarin as instructed and Importance of notifying clinic for changes in medications      Renée verbalizes understanding and agrees to warfarin dosing plan.    Instructed to call the Anticoagulation Clinic for any changes, questions or concerns. (#[phone])        Stephan Amor RN      OBJECTIVE:  Recent labs: (last 7 days)     12/02/20  1510 12/07/20  1448   INR 1.80* 1.70*         No question data found.  Anticoagulation Summary  As of 12/7/2020    INR goal:  2.0-3.0   TTR:  0.0 % (4 d)   INR used for dosing:     Warfarin maintenance plan:  7.5 mg (5 mg x 1.5) every Wed; 5 mg (5 mg x 1) all other days   Full warfarin instructions:  12/7: 10 mg; Otherwise 7.5 mg every Wed; 5 mg all other days   Weekly warfarin total:  37.5 mg   Plan last modified:  Farrah Lopez RN (12/2/2020)   Next INR check:  12/14/2020    Target end date:  11/16/2021    Indications    Personal history of DVT (deep vein thrombosis) [Z86.718]             Anticoagulation Episode Summary     INR check location:      Preferred lab:      Send INR reminders to:  D[facility] RAVI    Comments:        Anticoagulation Care Providers     Provider Role Specialty Phone number    Davidson Alegria MD Referring Family Medicine [phone]

## 2020-12-14 ENCOUNTER — ANTICOAGULATION THERAPY VISIT (OUTPATIENT)
Dept: NURSING | Facility: CLINIC | Age: 55
End: 2020-12-14

## 2020-12-14 DIAGNOSIS — Z86.718 PERSONAL HISTORY OF DVT (DEEP VEIN THROMBOSIS): ICD-10-CM

## 2020-12-14 LAB
CAPILLARY BLOOD COLLECTION: NORMAL
INR PPP: 2.8 (ref 0.86–1.14)

## 2020-12-14 PROCEDURE — 85610 PROTHROMBIN TIME: CPT | Performed by: FAMILY MEDICINE

## 2020-12-14 PROCEDURE — 36416 COLLJ CAPILLARY BLOOD SPEC: CPT | Performed by: FAMILY MEDICINE

## 2020-12-14 NOTE — PROGRESS NOTES
ANTICOAGULATION FOLLOW-UP CLINIC VISIT    Patient Name:  Renée Mcfadden  Date:  2020  Contact Type:  Telephone  Therapeutic  adjusted maintenance dose to reflect did not increase by 13 % dose is exactly as she took it last week.  SUBJECTIVE:  Patient Findings     Comments:    Assessed for S/S bleeding, clotting, medication, diet, health, activity and alcohol changes          Clinical Outcomes     Negatives:  Major bleeding event, Thromboembolic event, Anticoagulation-related hospital admission, Anticoagulation-related ED visit, Anticoagulation-related fatality    Comments:    Assessed for S/S bleeding, clotting, medication, diet, health, activity and alcohol changes             OBJECTIVE    Recent labs: (last 7 days)     20  1440   INR 2.80*       ASSESSMENT / PLAN  INR assessment THER    Recheck INR In: 1 WEEK    INR Location Clinic      Anticoagulation Summary  As of 2020    INR goal:  2.0-3.0   TTR:  42.5 % (1.6 wk)   INR used for dosin.80 (2020)   Warfarin maintenance plan:  7.5 mg (5 mg x 1.5) every Mon, Wed, Fri; 5 mg (5 mg x 1) all other days   Full warfarin instructions:  7.5 mg every Mon, Wed, Fri; 5 mg all other days   Weekly warfarin total:  42.5 mg   Plan last modified:  Jolynn Saba RN (2020)   Next INR check:  2020   Priority:  High   Target end date:  2021    Indications    Personal history of DVT (deep vein thrombosis) [Z86.718]             Anticoagulation Episode Summary     INR check location:      Preferred lab:      Send INR reminders to:  DOUGIE RAVI    Comments:        Anticoagulation Care Providers     Provider Role Specialty Phone number    Davidson Alegria MD Referring Family Medicine [phone]            See the Encounter Report to view Anticoagulation Flowsheet and Dosing Calendar (Go to Encounters tab in chart review, and find the Anticoagulation Therapy Visit)        Jolynn Saba RN

## 2020-12-21 ENCOUNTER — ANTICOAGULATION THERAPY VISIT (OUTPATIENT)
Dept: NURSING | Facility: CLINIC | Age: 55
End: 2020-12-21

## 2020-12-21 DIAGNOSIS — Z86.718 PERSONAL HISTORY OF DVT (DEEP VEIN THROMBOSIS): ICD-10-CM

## 2020-12-21 LAB
CAPILLARY BLOOD COLLECTION: NORMAL
INR PPP: 2.1 (ref 0.86–1.14)

## 2020-12-21 PROCEDURE — 85610 PROTHROMBIN TIME: CPT | Performed by: FAMILY MEDICINE

## 2020-12-21 PROCEDURE — 36416 COLLJ CAPILLARY BLOOD SPEC: CPT | Performed by: FAMILY MEDICINE

## 2020-12-21 NOTE — PROGRESS NOTES
ANTICOAGULATION MANAGEMENT     Patient Name:  Renée Mcfadden  Date:  12/21/2020    ASSESSMENT /SUBJECTIVE:    Today's INR result of 2.10 is therapeutic. Goal INR of 2.0-3.0      Warfarin dose taken: Less warfarin taken than planned which may be affecting INR    Diet: No new diet changes affecting INR    Medication changes/ interactions: No new medications/supplements affecting INR    Previous INR: Therapeutic     S/S of bleeding or thromboembolism: No    New injury or illness: No    Upcoming surgery, procedure or cardioversion: No    Additional findings: Completed course of Abx.       PLAN:    Telephone call with Renée regarding INR result and instructed:     Warfarin Dosing Instructions: Continue your current warfarin dose 7.5mg every Mon, Wed, & Fri; 5mg all other days of the week.     Instructed patient to follow up no later than: 2-3 weeks  Lab visit scheduled    Education provided: Target INR goal and significance of current INR result and Importance of therapeutic range      Renée verbalizes understanding and agrees to warfarin dosing plan.    Instructed to call the Anticoagulation Clinic for any changes, questions or concerns. (#[phone])        Stephan Amor RN      OBJECTIVE:  Recent labs: (last 7 days)     12/21/20  1444   INR 2.10*         No question data found.  Anticoagulation Summary  As of 12/21/2020    INR goal:  2.0-3.0   TTR:  63.7 % (2.6 wk)   INR used for dosing:     Warfarin maintenance plan:  7.5 mg (5 mg x 1.5) every Mon, Wed, Fri; 5 mg (5 mg x 1) all other days   Full warfarin instructions:  7.5 mg every Mon, Wed, Fri; 5 mg all other days   Weekly warfarin total:  42.5 mg   Plan last modified:  Jolynn Saba RN (12/14/2020)   Next INR check:     Target end date:  11/16/2021    Indications    Personal history of DVT (deep vein thrombosis) [Z86.718]             Anticoagulation Episode Summary     INR check location:      Preferred lab:      Send INR reminders to:  DOUGIE  TRAV    Comments:        Anticoagulation Care Providers     Provider Role Specialty Phone number    Davidson Alegria MD Referring Family Medicine [phone]

## 2020-12-30 ENCOUNTER — TELEPHONE (OUTPATIENT)
Dept: FAMILY MEDICINE | Facility: CLINIC | Age: 55
End: 2020-12-30

## 2020-12-30 NOTE — TELEPHONE ENCOUNTER
Patient states she was started on cephalexin and wanted to make sure it was okay to continue taking warfarin. Advised that it will increase her bleeding risk but is okay to continue taking warfarin    Farrah Lopez RN - [facility]

## 2020-12-30 NOTE — TELEPHONE ENCOUNTER
Patient left a voicemail asking for a call back regarding INR/Coumadin and medication questions.  Please call when able.  Thank you.  Liat Velasquez, RN  Anticoagulation Nurse - [facility]

## 2021-01-08 DIAGNOSIS — I10 BENIGN ESSENTIAL HYPERTENSION: ICD-10-CM

## 2021-01-08 DIAGNOSIS — Z86.718 PERSONAL HISTORY OF DVT (DEEP VEIN THROMBOSIS): ICD-10-CM

## 2021-01-08 NOTE — TELEPHONE ENCOUNTER
Reason for Call:  Medication or medication refill:    Name of the medication requested: NIFEdipine ER OSMOTIC (PROCARDIA XL) 60 MG 24 hr tablet    warfarin ANTICOAGULANT (COUMADIN) 5 MG tablet    Other request:  Patient got new insurance the beginning of the year.  Now has [employer] insurance. #[phone]. Group# [id number], ID: [id number], rx bin: [id number] rx pcn [id number], rx group [id number].    I asked patient what pharmacy these are to be sent to, and patient did not know, but gave the above information?    Can we leave a detailed message on this number? YES    Phone number patient can be reached at: Home number on file [phone] (home)    Best Time: Anytime    Call taken on 1/8/2021 at 10:29 AM by Yuki Fair

## 2021-01-11 ENCOUNTER — ANTICOAGULATION THERAPY VISIT (OUTPATIENT)
Dept: NURSING | Facility: CLINIC | Age: 56
End: 2021-01-11

## 2021-01-11 DIAGNOSIS — Z86.718 PERSONAL HISTORY OF DVT (DEEP VEIN THROMBOSIS): ICD-10-CM

## 2021-01-11 LAB
CAPILLARY BLOOD COLLECTION: NORMAL
INR PPP: 2.2 (ref 0.86–1.14)

## 2021-01-11 PROCEDURE — 85610 PROTHROMBIN TIME: CPT | Performed by: FAMILY MEDICINE

## 2021-01-11 PROCEDURE — 36416 COLLJ CAPILLARY BLOOD SPEC: CPT | Performed by: FAMILY MEDICINE

## 2021-01-11 NOTE — TELEPHONE ENCOUNTER
I called and spoke to patient she would like to use [employer] for now until she figures out the mail order  .iVviana Stratton CMA

## 2021-01-11 NOTE — PROGRESS NOTES
ANTICOAGULATION FOLLOW-UP CLINIC VISIT    Patient Name:  Renée Mcfadden  Date:  2021  Contact Type:  Telephone    SUBJECTIVE:  Patient Findings         Clinical Outcomes     Negatives:  Major bleeding event, Thromboembolic event, Anticoagulation-related hospital admission, Anticoagulation-related ED visit, Anticoagulation-related fatality           OBJECTIVE    Recent labs: (last 7 days)     21  1447   INR 2.20*       ASSESSMENT / PLAN  INR assessment THER    Recheck INR In: 3 WEEKS    INR Location Clinic      Anticoagulation Summary  As of 2021    INR goal:  2.0-3.0   TTR:  82.8 % (1.3 mo)   INR used for dosin.20 (2021)   Warfarin maintenance plan:  7.5 mg (5 mg x 1.5) every Mon, Wed, Fri; 5 mg (5 mg x 1) all other days   Full warfarin instructions:  7.5 mg every Mon, Wed, Fri; 5 mg all other days   Weekly warfarin total:  42.5 mg   No change documented:  Jolynn Saba RN   Plan last modified:  Jolynn Saba RN (2020)   Next INR check:  2021   Priority:  Maintenance   Target end date:  2021    Indications    Personal history of DVT (deep vein thrombosis) [Z86.718]             Anticoagulation Episode Summary     INR check location:      Preferred lab:      Send INR reminders to:  DOUGIE RAVI    Comments:        Anticoagulation Care Providers     Provider Role Specialty Phone number    Davidson Alegria MD Referring Family Medicine [phone]            See the Encounter Report to view Anticoagulation Flowsheet and Dosing Calendar (Go to Encounters tab in chart review, and find the Anticoagulation Therapy Visit)        Jolynn Saba RN

## 2021-01-12 RX ORDER — WARFARIN SODIUM 5 MG/1
TABLET ORAL
Qty: 40 TABLET | Refills: 0 | Status: SHIPPED | OUTPATIENT
Start: 2021-01-12 | End: 2021-02-11

## 2021-01-12 RX ORDER — NIFEDIPINE 60 MG/1
TABLET, EXTENDED RELEASE ORAL
Qty: 90 TABLET | Refills: 2 | Status: SHIPPED | OUTPATIENT
Start: 2021-01-12 | End: 2021-03-05

## 2021-01-12 NOTE — TELEPHONE ENCOUNTER
Prescription approved per [facility] Refill Protocol.  Ana Chapman RN

## 2021-01-15 ENCOUNTER — HEALTH MAINTENANCE LETTER (OUTPATIENT)
Age: 56
End: 2021-01-15

## 2021-02-01 ENCOUNTER — ANTICOAGULATION THERAPY VISIT (OUTPATIENT)
Dept: NURSING | Facility: CLINIC | Age: 56
End: 2021-02-01

## 2021-02-01 DIAGNOSIS — Z86.718 PERSONAL HISTORY OF DVT (DEEP VEIN THROMBOSIS): ICD-10-CM

## 2021-02-01 LAB
CAPILLARY BLOOD COLLECTION: NORMAL
INR PPP: 2 (ref 0.86–1.14)

## 2021-02-01 PROCEDURE — 36416 COLLJ CAPILLARY BLOOD SPEC: CPT | Performed by: FAMILY MEDICINE

## 2021-02-01 PROCEDURE — 85610 PROTHROMBIN TIME: CPT | Performed by: FAMILY MEDICINE

## 2021-02-01 NOTE — PROGRESS NOTES
ANTICOAGULATION FOLLOW-UP CLINIC VISIT    Patient Name:  Renée Mcfadden  Date:  2021  Contact Type:  Telephone    SUBJECTIVE:  Patient Findings         Clinical Outcomes     Negatives:  Major bleeding event, Thromboembolic event, Anticoagulation-related hospital admission, Anticoagulation-related ED visit, Anticoagulation-related fatality           OBJECTIVE    Recent labs: (last 7 days)     21  1507   INR 2.00*       ASSESSMENT / PLAN  No question data found.  Anticoagulation Summary  As of 2021    INR goal:  2.0-3.0   TTR:  88.7 % (2 mo)   INR used for dosin.00 (2021)   Warfarin maintenance plan:  7.5 mg (5 mg x 1.5) every Mon, Wed, Fri; 5 mg (5 mg x 1) all other days   Full warfarin instructions:  7.5 mg every Mon, Wed, Fri; 5 mg all other days   Weekly warfarin total:  42.5 mg   No change documented:  Jolynn Saba RN   Plan last modified:  Jolynn Saba RN (2020)   Next INR check:  3/1/2021   Priority:  Maintenance   Target end date:  2021    Indications    Personal history of DVT (deep vein thrombosis) [Z86.718]             Anticoagulation Episode Summary     INR check location:      Preferred lab:      Send INR reminders to:  DOUGIE RAVI    Comments:        Anticoagulation Care Providers     Provider Role Specialty Phone number    Davidson Alegria MD Referring Family Medicine [phone]            See the Encounter Report to view Anticoagulation Flowsheet and Dosing Calendar (Go to Encounters tab in chart review, and find the Anticoagulation Therapy Visit)        Jolynn Saba RN

## 2021-03-05 DIAGNOSIS — Z86.718 PERSONAL HISTORY OF DVT (DEEP VEIN THROMBOSIS): ICD-10-CM

## 2021-03-05 DIAGNOSIS — I10 BENIGN ESSENTIAL HYPERTENSION: ICD-10-CM

## 2021-03-05 RX ORDER — WARFARIN SODIUM 5 MG/1
TABLET ORAL
Qty: 40 TABLET | Refills: 1 | Status: SHIPPED | OUTPATIENT
Start: 2021-03-05 | End: 2021-05-13

## 2021-03-05 RX ORDER — NIFEDIPINE 60 MG/1
TABLET, EXTENDED RELEASE ORAL
Qty: 90 TABLET | Refills: 0 | Status: SHIPPED | OUTPATIENT
Start: 2021-03-05 | End: 2021-04-11

## 2021-03-05 NOTE — TELEPHONE ENCOUNTER
Okayed refills but see us in clinic at [place] in the next couple months     Please inform patient    Davidson Alegria MD

## 2021-03-09 ENCOUNTER — ANTICOAGULATION THERAPY VISIT (OUTPATIENT)
Dept: NURSING | Facility: CLINIC | Age: 56
End: 2021-03-09

## 2021-03-09 DIAGNOSIS — Z86.718 PERSONAL HISTORY OF DVT (DEEP VEIN THROMBOSIS): ICD-10-CM

## 2021-03-09 LAB
CAPILLARY BLOOD COLLECTION: NORMAL
INR PPP: 2.1 (ref 0.86–1.14)

## 2021-03-09 PROCEDURE — 36416 COLLJ CAPILLARY BLOOD SPEC: CPT | Performed by: FAMILY MEDICINE

## 2021-03-09 PROCEDURE — 85610 PROTHROMBIN TIME: CPT | Performed by: FAMILY MEDICINE

## 2021-03-09 NOTE — PROGRESS NOTES
Anticoagulation Management    Unable to reach Renée today.    Today's INR result of 2.10 is therapeutic (goal INR of 2.0-3.0).  Result received from: Clinic Lab    Follow up required to confirm warfarin dose taken and assess for changes    Left message to continue current dose of warfarin 5 mg tonight.      Anticoagulation clinic to follow up    Stephan Amor RN

## 2021-03-09 NOTE — TELEPHONE ENCOUNTER
Called and spoke with pt about message below. Pt will call back and schedule an appointment. Pt stated she wanted to thank Dr. Alegria for refilling her prescriptions.    Sri MAYFIELD MA

## 2021-03-10 NOTE — PROGRESS NOTES
ANTICOAGULATION MANAGEMENT     Patient Name:  Renée Mcfadden  Date:  3/10/2021    ASSESSMENT /SUBJECTIVE:    3/9/21 INR result of 2.1 is therapeutic. Goal INR of 2.0-3.0    PLAN:    Detailed message left for Renée regarding INR result and instructed:     Warfarin Dosing Instructions: Continue your current warfarin dose 7.5mg every Mon, Wed, & Fri; 5mg all other days of the week.     Instructed patient to follow up no later than: 6 weeks  Left detailed message with recommended recheck date    Education provided: Please call back if any changes to your diet, medications or how you've been taking warfarin, Target INR goal and significance of current INR result, Importance of therapeutic range and Contact [facility]ticoagulation: [phone]  with any changes, questions or concerns.        Stephan Amor RN      OBJECTIVE:  Recent labs: (last 7 days)     21  1459   INR 2.10*         No question data found.  Anticoagulation Summary  As of 3/9/2021    INR goal:  2.0-3.0   TTR:  92.9 % (3.2 mo)   INR used for dosin.10 (3/9/2021)   Warfarin maintenance plan:  7.5 mg (5 mg x 1.5) every Mon, Wed, Fri; 5 mg (5 mg x 1) all other days   Full warfarin instructions:  7.5 mg every Mon, Wed, Fri; 5 mg all other days   Weekly warfarin total:  42.5 mg   Plan last modified:  Jolynn Saba RN (2020)   Next INR check:  2021   Priority:  Maintenance   Target end date:  2021    Indications    Personal history of DVT (deep vein thrombosis) [Z86.718]             Anticoagulation Episode Summary     INR check location:      Preferred lab:      Send INR reminders to:  DOUGIE RAVI    Comments:        Anticoagulation Care Providers     Provider Role Specialty Phone number    Davidson Alegria MD Referring Family Medicine [phone]

## 2021-05-13 ENCOUNTER — ANTICOAGULATION THERAPY VISIT (OUTPATIENT)
Dept: FAMILY MEDICINE | Facility: CLINIC | Age: 56
End: 2021-05-13

## 2021-05-13 ENCOUNTER — OFFICE VISIT (OUTPATIENT)
Dept: FAMILY MEDICINE | Facility: CLINIC | Age: 56
End: 2021-05-13
Payer: COMMERCIAL

## 2021-05-13 VITALS
SYSTOLIC BLOOD PRESSURE: 138 MMHG | HEIGHT: 65 IN | DIASTOLIC BLOOD PRESSURE: 80 MMHG | HEART RATE: 96 BPM | WEIGHT: 194.13 LBS | BODY MASS INDEX: 32.35 KG/M2 | TEMPERATURE: 97.8 F

## 2021-05-13 DIAGNOSIS — I10 BENIGN ESSENTIAL HYPERTENSION: ICD-10-CM

## 2021-05-13 DIAGNOSIS — R76.0 ANTICARDIOLIPIN ANTIBODY POSITIVE: ICD-10-CM

## 2021-05-13 DIAGNOSIS — Z12.11 SCREEN FOR COLON CANCER: ICD-10-CM

## 2021-05-13 DIAGNOSIS — E78.5 HYPERLIPIDEMIA LDL GOAL <100: ICD-10-CM

## 2021-05-13 DIAGNOSIS — Z12.31 VISIT FOR SCREENING MAMMOGRAM: ICD-10-CM

## 2021-05-13 DIAGNOSIS — F17.200 SMOKER: ICD-10-CM

## 2021-05-13 DIAGNOSIS — Z86.718 PERSONAL HISTORY OF DVT (DEEP VEIN THROMBOSIS): ICD-10-CM

## 2021-05-13 DIAGNOSIS — R53.83 FATIGUE, UNSPECIFIED TYPE: ICD-10-CM

## 2021-05-13 DIAGNOSIS — Z86.718 PERSONAL HISTORY OF DVT (DEEP VEIN THROMBOSIS): Primary | ICD-10-CM

## 2021-05-13 LAB
ALBUMIN SERPL-MCNC: 3.8 G/DL (ref 3.4–5)
ALP SERPL-CCNC: 55 U/L (ref 40–150)
ALT SERPL W P-5'-P-CCNC: 42 U/L (ref 0–50)
ANION GAP SERPL CALCULATED.3IONS-SCNC: 3 MMOL/L (ref 3–14)
AST SERPL W P-5'-P-CCNC: 22 U/L (ref 0–45)
BASOPHILS # BLD AUTO: 0 10E9/L (ref 0–0.2)
BASOPHILS NFR BLD AUTO: 0.4 %
BILIRUB SERPL-MCNC: 0.3 MG/DL (ref 0.2–1.3)
BUN SERPL-MCNC: 9 MG/DL (ref 7–30)
CALCIUM SERPL-MCNC: 8.1 MG/DL (ref 8.5–10.1)
CHLORIDE SERPL-SCNC: 109 MMOL/L (ref 94–109)
CHOLEST SERPL-MCNC: 221 MG/DL
CO2 SERPL-SCNC: 27 MMOL/L (ref 20–32)
CREAT SERPL-MCNC: 0.75 MG/DL (ref 0.52–1.04)
DIFFERENTIAL METHOD BLD: NORMAL
EOSINOPHIL # BLD AUTO: 0.2 10E9/L (ref 0–0.7)
EOSINOPHIL NFR BLD AUTO: 2.7 %
ERYTHROCYTE [DISTWIDTH] IN BLOOD BY AUTOMATED COUNT: 13.9 % (ref 10–15)
GFR SERPL CREATININE-BSD FRML MDRD: 88 ML/MIN/{1.73_M2}
GLUCOSE SERPL-MCNC: 88 MG/DL (ref 70–99)
HCT VFR BLD AUTO: 44.2 % (ref 35–47)
HDLC SERPL-MCNC: 83 MG/DL
HGB BLD-MCNC: 14.7 G/DL (ref 11.7–15.7)
INR PPP: 2.5 (ref 0.86–1.14)
LDLC SERPL CALC-MCNC: 127 MG/DL
LYMPHOCYTES # BLD AUTO: 2.2 10E9/L (ref 0.8–5.3)
LYMPHOCYTES NFR BLD AUTO: 33 %
MCH RBC QN AUTO: 32.5 PG (ref 26.5–33)
MCHC RBC AUTO-ENTMCNC: 33.3 G/DL (ref 31.5–36.5)
MCV RBC AUTO: 98 FL (ref 78–100)
MONOCYTES # BLD AUTO: 0.5 10E9/L (ref 0–1.3)
MONOCYTES NFR BLD AUTO: 7.2 %
NEUTROPHILS # BLD AUTO: 3.9 10E9/L (ref 1.6–8.3)
NEUTROPHILS NFR BLD AUTO: 56.7 %
NONHDLC SERPL-MCNC: 138 MG/DL
PLATELET # BLD AUTO: 237 10E9/L (ref 150–450)
POTASSIUM SERPL-SCNC: 4.7 MMOL/L (ref 3.4–5.3)
PROT SERPL-MCNC: 7.4 G/DL (ref 6.8–8.8)
RBC # BLD AUTO: 4.53 10E12/L (ref 3.8–5.2)
SODIUM SERPL-SCNC: 139 MMOL/L (ref 133–144)
TRIGL SERPL-MCNC: 57 MG/DL
TSH SERPL DL<=0.005 MIU/L-ACNC: 3.52 MU/L (ref 0.4–4)
WBC # BLD AUTO: 6.8 10E9/L (ref 4–11)

## 2021-05-13 PROCEDURE — 36415 COLL VENOUS BLD VENIPUNCTURE: CPT | Performed by: FAMILY MEDICINE

## 2021-05-13 PROCEDURE — 80061 LIPID PANEL: CPT | Performed by: FAMILY MEDICINE

## 2021-05-13 PROCEDURE — 85610 PROTHROMBIN TIME: CPT | Performed by: FAMILY MEDICINE

## 2021-05-13 PROCEDURE — 80050 GENERAL HEALTH PANEL: CPT | Performed by: FAMILY MEDICINE

## 2021-05-13 PROCEDURE — 99214 OFFICE O/P EST MOD 30 MIN: CPT | Performed by: FAMILY MEDICINE

## 2021-05-13 RX ORDER — WARFARIN SODIUM 5 MG/1
TABLET ORAL
Qty: 90 TABLET | Refills: 3 | Status: SHIPPED | OUTPATIENT
Start: 2021-05-13 | End: 2021-07-15

## 2021-05-13 RX ORDER — NIFEDIPINE 60 MG/1
TABLET, EXTENDED RELEASE ORAL
Qty: 90 TABLET | Refills: 3 | Status: SHIPPED | OUTPATIENT
Start: 2021-05-13 | End: 2021-11-27

## 2021-05-13 ASSESSMENT — MIFFLIN-ST. JEOR: SCORE: 1467.04

## 2021-05-13 NOTE — PATIENT INSTRUCTIONS
Stay on same medications    Could use knee high compression stockings    Increase walking as able    We will send you lab results    Schedule mammogram    Return the FIT stool test    Lotion to lower leg regularly and don't scratch    Work on smoking cessation

## 2021-05-13 NOTE — PROGRESS NOTES
"         Silvia Chandler is a 55 year old who presents for the following health issues     HPI     Follow up on DVT       Review of Systems     Clot dx in October    On warfarin    No side effects on med    Leg not back to normal      Patient had bloodwork from rheumatology at [facility]    Positive for anticardiolipin antibody     Has follow up with rheumatology in June    Possible lupus    No bleeding on med    Left leg still bigger, and  Blistered/ dry compared to other        Work at  Desk  All the time    Hard  To be in cold due to raynauds    Not much walking      Objective    BP (!) 151/82 (BP Location: Left arm, Patient Position: Chair, Cuff Size: Adult Regular)   Pulse 96   Temp 97.8  F (36.6  C) (Temporal)   Ht 1.644 m (5' 4.72\")   Wt 88.1 kg (194 lb 2 oz)   Breastfeeding No   BMI 32.58 kg/m    Body mass index is 32.58 kg/m .  Physical Exam  Constitutional:       Appearance: She is well-developed.   HENT:      Head: Normocephalic and atraumatic.   Eyes:      Conjunctiva/sclera: Conjunctivae normal.   Neck:      Vascular: No carotid bruit.   Cardiovascular:      Rate and Rhythm: Normal rate and regular rhythm.      Heart sounds: Normal heart sounds.   Pulmonary:      Effort: Pulmonary effort is normal. No respiratory distress.      Breath sounds: Normal breath sounds.   Neurological:      Mental Status: She is alert and oriented to person, place, and time.         left  Lower leg feels  Firmer than right  But not swollen  Compared to right     This is chronic per patient    Some irrritation  To skin; some excoriation present    Sensation/ movement/ range of motion  Okay in lower leg             ASSESSMENT / PLAN:  (Z88.286) Personal history of DVT (deep vein thrombosis)  (primary encounter diagnosis)  Comment: check inr.  Values have been quite stable in therapeutic range.  Refill med.   Plan: INR, warfarin ANTICOAGULANT (COUMADIN) 5 MG         tablet             (R76.0) Anticardiolipin antibody " positive  Comment: with this positive, she should be on long term anticoagulation  Plan: patient to follow up with rheumatology soon.  Possible lupus based on labs/ symptoms?    (E78.5) Hyperlipidemia LDL goal <100  Comment: check fasting    Plan: Lipid panel reflex to direct LDL Fasting,         Comprehensive metabolic panel             (I10) Benign essential hypertension  Comment: on recheck blood pressure acceptable  Plan: NIFEdipine ER OSMOTIC (PROCARDIA XL) 60 MG 24         hr tablet        Refill med     (F17.200) Smoker  Comment: discussed   Plan: advised cessation     (Z12.31) Visit for screening mammogram  Comment: patient to call and schedule   Plan: *MA Screening Digital Bilateral             (Z12.11) Screen for colon cancer  Comment: fit test   Plan: Fecal colorectal cancer screen (FIT)             (R53.83) Fatigue, unspecified type  Comment:  Check   Plan: CBC with platelets differential, TSH with free         T4 reflex             Increase walking and use comperession stocking for left leg  Also lotion and not scratching      I reviewed the patient's medications, allergies, medical history, family history, and social history.    Davidson Alegria MD

## 2021-05-13 NOTE — PROGRESS NOTES
ANTICOAGULATION MANAGEMENT     Patient Name:  Renée Mcfadden  Date:  2021    ASSESSMENT /SUBJECTIVE:    Today's INR result of 2.50 is therapeutic. Goal INR of 2.0-3.0      Warfarin dose taken: Warfarin taken as instructed    Diet: No new diet changes affecting INR    Medication changes/ interactions: No new medications/supplements affecting INR    Previous INR: Therapeutic     S/S of bleeding or thromboembolism: No    New injury or illness: No    Upcoming surgery, procedure or cardioversion: No    Additional findings: None      PLAN:    Telephone call with Renée regarding INR result and instructed:     Warfarin Dosing Instructions: Continue your current warfarin dose 7.5mg every Mon, Wed, & Fri; 5mg all other days of the week,    Instructed patient to follow up no later than: 6 weeks  Lab visit scheduled    Education provided: Target INR goal and significance of current INR result and Importance of therapeutic range      Geraldine verbalizes understanding and agrees to warfarin dosing plan.    Instructed to call the Anticoagulation Clinic for any changes, questions or concerns. (#[phone])        Stephan Amor RN      OBJECTIVE:  Recent labs: (last 7 days)     21  0737   INR 2.50*         No question data found.  Anticoagulation Summary  As of 2021    INR goal:  2.0-3.0   TTR:  95.7 % (5.4 mo)   INR used for dosin.50 (2021)   Warfarin maintenance plan:  7.5 mg (5 mg x 1.5) every Mon, Wed, Fri; 5 mg (5 mg x 1) all other days   Full warfarin instructions:  7.5 mg every Mon, Wed, Fri; 5 mg all other days   Weekly warfarin total:  42.5 mg   Plan last modified:  Jolynn Saba RN (2020)   Next INR check:     Priority:  Maintenance   Target end date:  2021    Indications    Personal history of DVT (deep vein thrombosis) [Z86.718]             Anticoagulation Episode Summary     INR check location:      Preferred lab:      Send INR reminders to:  DOUGIE RAVI    Comments:         Anticoagulation Care Providers     Provider Role Specialty Phone number    Davidson Alegria MD Referring Family Medicine [phone]

## 2021-05-14 NOTE — RESULT ENCOUNTER NOTE
Cholesterol is moderately elevated.  Keep working on healthy diet/exercise.      Other labs are okay.    Davidson Alegria MD

## 2021-06-24 ENCOUNTER — ANTICOAGULATION THERAPY VISIT (OUTPATIENT)
Dept: FAMILY MEDICINE | Facility: CLINIC | Age: 56
End: 2021-06-24

## 2021-06-24 DIAGNOSIS — Z86.718 PERSONAL HISTORY OF DVT (DEEP VEIN THROMBOSIS): ICD-10-CM

## 2021-06-24 LAB — INR PPP: 2.2 (ref 0.86–1.14)

## 2021-06-24 PROCEDURE — 36416 COLLJ CAPILLARY BLOOD SPEC: CPT | Performed by: FAMILY MEDICINE

## 2021-06-24 PROCEDURE — 85610 PROTHROMBIN TIME: CPT | Performed by: FAMILY MEDICINE

## 2021-06-24 NOTE — PROGRESS NOTES
ANTICOAGULATION MANAGEMENT     Renée Mcfadden 55 year old female is on warfarin with therapeutic INR result. (Goal INR 2.0-3.0)    Recent labs: (last 7 days)     06/24/21  0713   INR 2.20*       ASSESSMENT     Source(s): Chart Review and Patient/Caregiver Call       Warfarin doses taken: Warfarin taken as instructed    Diet: No new diet changes identified    New illness, injury, or hospitalization: No    Medication/supplement changes: None noted    Signs or symptoms of bleeding or clotting: No    Previous INR: Therapeutic last 2(+) visits    Additional findings: None     PLAN     Recommended plan for no diet, medication or health factor changes affecting INR     Dosing Instructions: Continue your current warfarin dose with next INR in 6 weeks       Summary  As of 6/24/2021    Full warfarin instructions:  7.5 mg every Mon, Wed, Fri; 5 mg all other days   Next INR check:  8/5/2021             Telephone call with Renée whom verbalizes understanding and agrees to plan    Lab visit scheduled    Education provided: Target INR goal and significance of current INR result, Importance of therapeutic range and Contact [phone]  with any changes, questions or concerns.     Plan made per [facility] anticoagulation protocol    Stephan Amor RN  Anticoagulation Clinic  6/24/2021    _______________________________________________________________________     Anticoagulation Episode Summary     Current INR goal:  2.0-3.0   TTR:  96.6 % (6.8 mo)   Target end date:  11/16/2021   Send INR reminders to:  DOUGIE RAVI    Indications    Personal history of DVT (deep vein thrombosis) [Z86.718]           Comments:           Anticoagulation Care Providers     Provider Role Specialty Phone number    Davidson Alegria MD Referring Family Medicine [phone]

## 2021-07-15 DIAGNOSIS — Z86.718 PERSONAL HISTORY OF DVT (DEEP VEIN THROMBOSIS): ICD-10-CM

## 2021-07-15 RX ORDER — WARFARIN SODIUM 5 MG/1
TABLET ORAL
Qty: 36 TABLET | Refills: 1 | Status: SHIPPED | OUTPATIENT
Start: 2021-07-15 | End: 2021-11-26

## 2021-08-06 DIAGNOSIS — Z86.718 PERSONAL HISTORY OF DVT (DEEP VEIN THROMBOSIS): Primary | ICD-10-CM

## 2021-08-11 ENCOUNTER — TRANSFERRED RECORDS (OUTPATIENT)
Dept: HEALTH INFORMATION MANAGEMENT | Facility: CLINIC | Age: 56
End: 2021-08-11

## 2021-08-13 ENCOUNTER — ANTICOAGULATION THERAPY VISIT (OUTPATIENT)
Dept: ANTICOAGULATION | Facility: CLINIC | Age: 56
End: 2021-08-13

## 2021-08-13 ENCOUNTER — LAB (OUTPATIENT)
Dept: LAB | Facility: CLINIC | Age: 56
End: 2021-08-13
Payer: COMMERCIAL

## 2021-08-13 DIAGNOSIS — Z86.718 PERSONAL HISTORY OF DVT (DEEP VEIN THROMBOSIS): ICD-10-CM

## 2021-08-13 DIAGNOSIS — Z86.718 PERSONAL HISTORY OF DVT (DEEP VEIN THROMBOSIS): Primary | ICD-10-CM

## 2021-08-13 LAB — INR BLD: 2.1 (ref 0.9–1.1)

## 2021-08-13 PROCEDURE — 36416 COLLJ CAPILLARY BLOOD SPEC: CPT

## 2021-08-13 PROCEDURE — 85610 PROTHROMBIN TIME: CPT

## 2021-08-13 NOTE — PROGRESS NOTES
ANTICOAGULATION MANAGEMENT     Renée Mcfadden 55 year old female is on warfarin with therapeutic INR result. (Goal INR 2.0-3.0)    Recent labs: (last 7 days)     08/13/21  1528   INR 2.1*       ASSESSMENT     Source(s): Chart Review and Patient/Caregiver Call       Warfarin doses taken: Warfarin taken as instructed    Diet: No new diet changes identified    New illness, injury, or hospitalization: No    Medication/supplement changes: None noted    Signs or symptoms of bleeding or clotting: No    Previous INR: Therapeutic last 2(+) visits    Additional findings: None     PLAN     Recommended plan for no diet, medication or health factor changes affecting INR     Dosing Instructions: Continue your current warfarin dose with next INR in 6 weeks       Summary  As of 8/13/2021    Full warfarin instructions:  7.5 mg every Mon, Wed, Fri; 5 mg all other days   Next INR check:  9/24/2021             Telephone call with Renée who verbalizes understanding and agrees to plan    Lab visit scheduled    Education provided: Target INR goal and significance of current INR result    Plan made per [facility] anticoagulation protocol    Farrah Lopez RN  Anticoagulation Clinic  8/13/2021    _______________________________________________________________________     Anticoagulation Episode Summary     Current INR goal:  2.0-3.0   TTR:  97.3 % (8.5 mo)   Target end date:  11/16/2021   Send INR reminders to:  DOUGIE RAVI    Indications    Personal history of DVT (deep vein thrombosis) [Z86.718]           Comments:           Anticoagulation Care Providers     Provider Role Specialty Phone number    Davidson Alegria MD Referring Family Medicine [phone]

## 2021-09-04 ENCOUNTER — HEALTH MAINTENANCE LETTER (OUTPATIENT)
Age: 56
End: 2021-09-04

## 2021-10-04 ENCOUNTER — TELEPHONE (OUTPATIENT)
Dept: FAMILY MEDICINE | Facility: CLINIC | Age: 56
End: 2021-10-04

## 2021-10-04 NOTE — TELEPHONE ENCOUNTER
ANTICOAGULATION     Renée Mcfadden is overdue for INR check.      Was unable to reach patient and was unable to leave a voicemail. If patient calls, please schedule INR check as soon as possible.    Sent my chart message with reminder letter    Patient has cancelled the last 4 scheduled INR appointments.    Jenifer Wick RN

## 2021-10-21 ENCOUNTER — MYC MEDICAL ADVICE (OUTPATIENT)
Dept: NURSING | Facility: CLINIC | Age: 56
End: 2021-10-21
Payer: COMMERCIAL

## 2021-10-28 ENCOUNTER — DOCUMENTATION ONLY (OUTPATIENT)
Dept: ANTICOAGULATION | Facility: CLINIC | Age: 56
End: 2021-10-28

## 2021-10-28 NOTE — LETTER
October 28, 2021      Renée Mcfadden  [address] [place] [facility] [address]      Dear Renée,    You are currently under the care of [facility] for your warfarin (Coumadin ) therapy.  We are contacting you because our records show you were due for an INR on 9/24/21.    There are potentially serious risks when taking warfarin without careful monitoring and we want to make sure you are safely managed.  Routine INR monitoring is required for warfarin refills.     Please call [phone] as soon as possible to schedule an appointment.  If there has been a change in your care or other concerns, please let us know so we can help and or update our records.     Sincerely,       [facility]

## 2021-10-28 NOTE — PROGRESS NOTES
ANTICOAGULATION     Renée Mcfadden is overdue for INR check.      Reminder letter sent    Farrah Lopez RN

## 2021-11-11 ENCOUNTER — DOCUMENTATION ONLY (OUTPATIENT)
Dept: FAMILY MEDICINE | Facility: CLINIC | Age: 56
End: 2021-11-11
Payer: COMMERCIAL

## 2021-11-11 NOTE — LETTER
November 11, 2021      Renée Mcfadden  [address] [place]. [place]"" [id number]      Dear Renée,    You are currently under the care of [facility] for your warfarin (Coumadin ) therapy.  We are contacting you because our records show you were due for an INR on 9/24/21.    There are potentially serious risks when taking warfarin without careful monitoring and we want to make sure you are safely managed.  Routine INR monitoring is required for warfarin refills.     Please call [phone] as soon as possible to schedule an appointment.  If there has been a change in your care or other concerns, please let us know so we can help and or update our records.     Sincerely,       [facility]

## 2021-11-16 NOTE — TELEPHONE ENCOUNTER
Please find out what meds patient needs refills on - and pend with the correct pharmacy.     Lucille Quintana PA-C

## 2021-11-26 ENCOUNTER — TELEPHONE (OUTPATIENT)
Dept: FAMILY MEDICINE | Facility: CLINIC | Age: 56
End: 2021-11-26

## 2021-11-26 ENCOUNTER — LAB (OUTPATIENT)
Dept: LAB | Facility: CLINIC | Age: 56
End: 2021-11-26
Payer: COMMERCIAL

## 2021-11-26 ENCOUNTER — ANTICOAGULATION THERAPY VISIT (OUTPATIENT)
Dept: ANTICOAGULATION | Facility: CLINIC | Age: 56
End: 2021-11-26

## 2021-11-26 DIAGNOSIS — Z86.718 PERSONAL HISTORY OF DVT (DEEP VEIN THROMBOSIS): ICD-10-CM

## 2021-11-26 DIAGNOSIS — I10 BENIGN ESSENTIAL HYPERTENSION: ICD-10-CM

## 2021-11-26 DIAGNOSIS — Z86.718 PERSONAL HISTORY OF DVT (DEEP VEIN THROMBOSIS): Primary | ICD-10-CM

## 2021-11-26 LAB — INR BLD: 1.1 (ref 0.9–1.1)

## 2021-11-26 PROCEDURE — 85610 PROTHROMBIN TIME: CPT

## 2021-11-26 PROCEDURE — 36416 COLLJ CAPILLARY BLOOD SPEC: CPT

## 2021-11-26 RX ORDER — WARFARIN SODIUM 5 MG/1
TABLET ORAL
Qty: 90 TABLET | Refills: 1 | Status: SHIPPED | OUTPATIENT
Start: 2021-11-26 | End: 2021-11-27

## 2021-11-26 NOTE — TELEPHONE ENCOUNTER
[facility] unable to address INR as the referral is overdue. Once signed can do. Patient has not been seen in over 90 days so RN can not sign for the warfarin. Pharmacy entered per patient requested pharmacy. Please note mail order pharmacy.    Patient also notes that mother passed in May so she has been taking her warfarin. It was larger in size so she thought it was more. She has been taking 2.5 mg daily instead of 7.5 mg (5 mg x 1.5) every Mon, Wed, Fri; 5 mg (5 mg x 1) all other days. She notes taking two tablets today since reading was 1.1.    ANTICOAGULATION MANAGEMENT      Renée Mcfadden due for annual renewal of referral to anticoagulation monitoring. Order pended for your review and signature.      ANTICOAGULATION SUMMARY      Warfarin indication(s)     DVT    Heart valve present?  NO       Current goal range   INR: 2.0-3.0     Goal appropriate for indication? Yes, INR 2-3 appropriate for hx of DVT, PE, hypercoagulable state, Afib, LVAD, or bileaflet AVR without risk factors     Current duration of therapy Indefinite/long term therapy   Time in Therapeutic Range (TTR)  (Goal > 60%) 97.3%  On 8/13/2021 when last checked.   Office visit with referring provider's group within last year yes on 5/13/2021       Edilia Gardner, RN

## 2021-11-26 NOTE — PROGRESS NOTES
ANTICOAGULATION MANAGEMENT     Renée Mcfadden 56 year old female is on warfarin with subtherapeutic INR result. (Goal INR 2.0-3.0)    Recent labs: (last 7 days)     11/26/21  0844   INR 1.1       ASSESSMENT     Source(s): Chart Review and Patient/Caregiver Call       Warfarin doses taken: Missed dose(s) may be affecting INR and stopped taking hers all together states she has been taking her moms pills after she passed away but she states those are blue when 2.5 mg should be green. she is advised to not take her mothers pills.     Diet: No new diet changes identified    New illness, injury, or hospitalization: No    Medication/supplement changes: as stated above    Signs or symptoms of bleeding or clotting: No    Previous INR: last INR Aug 13th    Additional findings: None     PLAN     Recommended plan for no diet, medication or health factor changes affecting INR     Dosing Instructions: Has no Warfarin waiting for neew RX to deliver. with next INR in 1 week       Summary  As of 11/26/2021    Full warfarin instructions:  7.5 mg every Mon, Wed, Fri; 5 mg all other days   Next INR check:               Telephone call with Renée who verbalizes understanding and agrees to plan    will call when she starts her warfarin.    Education provided: Importance of consistent vitamin K intake, Impact of vitamin K foods on INR, Vitamin K content of foods, Importance of taking warfarin as instructed, Warfarin tablet strength change; remove and/or discard previous strength from medication supply, Monitoring for bleeding signs and symptoms, Monitoring for clotting signs and symptoms and When to seek medical attention/emergency care    Plan made per [facility] anticoagulation protocol routing to pcp to let him know she has been off warfarin and is restarting. Does she need to be bridged?    Jolynn Saba, RN  Anticoagulation Clinic  11/26/2021    _______________________________________________________________________      Anticoagulation Episode Summary     Current INR goal:  2.0-3.0   TTR:  97.3 % (8.5 mo)   Target end date:  Indefinite   Send INR reminders to:  DOUGIE RAVI    Indications    Personal history of DVT (deep vein thrombosis) [Z86.328]           Comments:           Anticoagulation Care Providers     Provider Role Specialty Phone number    Davidson Alegria MD Referring Family Medicine [phone]

## 2021-11-27 RX ORDER — WARFARIN SODIUM 5 MG/1
TABLET ORAL
Qty: 90 TABLET | Refills: 0 | Status: SHIPPED | OUTPATIENT
Start: 2021-11-27 | End: 2022-01-02

## 2021-11-27 RX ORDER — NIFEDIPINE 60 MG/1
TABLET, EXTENDED RELEASE ORAL
Qty: 90 TABLET | Refills: 0 | Status: SHIPPED | OUTPATIENT
Start: 2021-11-27 | End: 2022-02-11

## 2021-11-27 NOTE — PROGRESS NOTES
Follow up Monday to see if patient restarted warfain.  Gera Piña Rn  [facility]

## 2021-11-29 NOTE — PROGRESS NOTES
Called patient no answer no answering machine unable to leave message at this time.  Please call patient and ask if she has started back up on warfain.  Gera Piña Rn  [facility]

## 2021-11-30 NOTE — PROGRESS NOTES
Spoke to patient she was able to start her warfarin yesterday as instructed she was worked into lab schedule Friday as she is pretty limited as to when she can come in.  Gera iPña Rn  [facility]

## 2021-12-03 ENCOUNTER — ANTICOAGULATION THERAPY VISIT (OUTPATIENT)
Dept: ANTICOAGULATION | Facility: CLINIC | Age: 56
End: 2021-12-03

## 2021-12-03 ENCOUNTER — LAB (OUTPATIENT)
Dept: LAB | Facility: CLINIC | Age: 56
End: 2021-12-03
Payer: COMMERCIAL

## 2021-12-03 DIAGNOSIS — Z86.718 PERSONAL HISTORY OF DVT (DEEP VEIN THROMBOSIS): Primary | ICD-10-CM

## 2021-12-03 DIAGNOSIS — Z86.718 PERSONAL HISTORY OF DVT (DEEP VEIN THROMBOSIS): ICD-10-CM

## 2021-12-03 LAB — INR BLD: 1.4 (ref 0.9–1.1)

## 2021-12-03 PROCEDURE — 36415 COLL VENOUS BLD VENIPUNCTURE: CPT

## 2021-12-03 PROCEDURE — 85610 PROTHROMBIN TIME: CPT

## 2021-12-03 NOTE — PROGRESS NOTES
ANTICOAGULATION MANAGEMENT     Renée Mcfadden 56 year old female is on warfarin with subtherapeutic INR result. (Goal INR 2.0-3.0)    Recent labs: (last 7 days)     12/03/21  1607   INR 1.4*       ASSESSMENT     Source(s): Chart Review       Warfarin doses taken: Warfarin taken as instructed    Diet: No new diet changes identified    New illness, injury, or hospitalization: No    Medication/supplement changes: None noted    Signs or symptoms of bleeding or clotting: No    Previous INR: had lapse in dose out of meds new job change insurance change.    Additional findings: None     PLAN     Recommended plan for no diet, medication or health factor changes affecting INR     Dosing Instructions: Booster dose then continue your current warfarin dose with next INR in 1 week       Summary  As of 12/3/2021    Full warfarin instructions:  12/3: 10 mg; Otherwise 7.5 mg every Mon, Wed, Fri; 5 mg all other days   Next INR check:               Telephone call with Renée who verbalizes understanding and agrees to plan    Lab visit scheduled    Education provided: Please call back if any changes to your diet, medications or how you've been taking warfarin    Plan made per [facility] anticoagulation protocol    Jolynn Saba RN  Anticoagulation Clinic  12/3/2021    _______________________________________________________________________     Anticoagulation Episode Summary     Current INR goal:  2.0-3.0   TTR:  94.7 % (8.7 mo)   Target end date:  Indefinite   Send INR reminders to:  DOUGIE RAVI    Indications    Personal history of DVT (deep vein thrombosis) [Z86.718]           Comments:           Anticoagulation Care Providers     Provider Role Specialty Phone number    Davidson Alegria MD Referring Family Medicine [phone]

## 2021-12-09 ENCOUNTER — ANTICOAGULATION THERAPY VISIT (OUTPATIENT)
Dept: ANTICOAGULATION | Facility: CLINIC | Age: 56
End: 2021-12-09

## 2021-12-09 ENCOUNTER — LAB (OUTPATIENT)
Dept: LAB | Facility: CLINIC | Age: 56
End: 2021-12-09
Payer: COMMERCIAL

## 2021-12-09 DIAGNOSIS — Z86.718 PERSONAL HISTORY OF DVT (DEEP VEIN THROMBOSIS): Primary | ICD-10-CM

## 2021-12-09 DIAGNOSIS — Z86.718 PERSONAL HISTORY OF DVT (DEEP VEIN THROMBOSIS): ICD-10-CM

## 2021-12-09 LAB — INR BLD: 1.9 (ref 0.9–1.1)

## 2021-12-09 PROCEDURE — 85610 PROTHROMBIN TIME: CPT

## 2021-12-09 PROCEDURE — 36416 COLLJ CAPILLARY BLOOD SPEC: CPT

## 2021-12-09 NOTE — PROGRESS NOTES
ANTICOAGULATION MANAGEMENT     Renée Mcfadden 56 year old female is on warfarin with subtherapeutic INR result. (Goal INR 2.0-3.0)    Recent labs: (last 7 days)     12/09/21  1435   INR 1.9*       ASSESSMENT     Source(s): Chart Review and Patient/Caregiver Call       Warfarin doses taken: Warfarin taken as instructed    Diet: No new diet changes identified    New illness, injury, or hospitalization: No    Medication/supplement changes: None noted    Signs or symptoms of bleeding or clotting: No    Previous INR: Subtherapeutic    Additional findings: None     PLAN     Recommended plan for no diet, medication or health factor changes affecting INR     Dosing Instructions:  Increase your warfarin dose (5.9% change) with next INR in 2 weeks       Summary  As of 12/9/2021    Full warfarin instructions:  5 mg every Sun, Tue, Thu; 7.5 mg all other days   Next INR check:               Telephone call with Renée who verbalizes understanding and agrees to plan    Lab visit scheduled    Education provided: Goal range and significance of current result    Plan made per [facility] anticoagulation protocol    Farrah Lopez RN  Anticoagulation Clinic  12/9/2021    _______________________________________________________________________     Anticoagulation Episode Summary     Current INR goal:  2.0-3.0   TTR:  94.7 % (8.7 mo)   Target end date:  Indefinite   Send INR reminders to:  DOUGIE RAVI    Indications    Personal history of DVT (deep vein thrombosis) [Z86.628]           Comments:           Anticoagulation Care Providers     Provider Role Specialty Phone number    Davidson Alegria MD Referring Family Medicine [phone]

## 2021-12-23 ENCOUNTER — LAB (OUTPATIENT)
Dept: LAB | Facility: CLINIC | Age: 56
End: 2021-12-23

## 2021-12-23 ENCOUNTER — ANTICOAGULATION THERAPY VISIT (OUTPATIENT)
Dept: ANTICOAGULATION | Facility: CLINIC | Age: 56
End: 2021-12-23

## 2021-12-23 DIAGNOSIS — Z86.718 PERSONAL HISTORY OF DVT (DEEP VEIN THROMBOSIS): Primary | ICD-10-CM

## 2021-12-23 DIAGNOSIS — Z86.718 PERSONAL HISTORY OF DVT (DEEP VEIN THROMBOSIS): ICD-10-CM

## 2021-12-23 LAB — INR BLD: 2.3 (ref 0.9–1.1)

## 2021-12-23 PROCEDURE — 85610 PROTHROMBIN TIME: CPT

## 2021-12-23 PROCEDURE — 36415 COLL VENOUS BLD VENIPUNCTURE: CPT

## 2021-12-23 NOTE — PROGRESS NOTES
Anticoagulation Management    Unable to reach [facility] today.    Today's INR result of 2.3 is therapeutic (goal INR of 2.0-3.0).  Result received from: Clinic Lab    Follow up required to confirm warfarin dose taken and assess for changes    Left message to continue current dose of warfarin 7.5 mg tonight. If no temporary changes, okay to continue dose and recheck in 3 weeks.      Anticoagulation clinic to follow up    Edilia Dubois RN

## 2021-12-24 NOTE — PROGRESS NOTES
ANTICOAGULATION MANAGEMENT     Renée Mcfadden 56 year old female is on warfarin with therapeutic INR result. (Goal INR 2.0-3.0)    Recent labs: (last 7 days)     12/23/21  1355   INR 2.3*       ASSESSMENT     Source(s): Chart Review       Warfarin doses taken: Warfarin taken as instructed    Diet: No new diet changes identified    New illness, injury, or hospitalization: No    Medication/supplement changes: None noted    Signs or symptoms of bleeding or clotting: No    Previous INR: Therapeutic last 2(+) visits    Additional findings: None     PLAN     Recommended plan for no diet, medication or health factor changes affecting INR     Dosing Instructions: Continue your current warfarin dose with next INR in 4 weeks       Summary  As of 12/23/2021    Full warfarin instructions:  5 mg every Mon, Wed, Fri; 7.5 mg all other days   Next INR check:  1/13/2022             Telephone call with Renée who verbalizes understanding and agrees to plan    Lab visit scheduled    Education provided: None required and Please call back if any changes to your diet, medications or how you've been taking warfarin    Plan made per [facility] anticoagulation protocol    Jolynn Saba RN  Anticoagulation Clinic  12/24/2021    _______________________________________________________________________     Anticoagulation Episode Summary     Current INR goal:  2.0-3.0   TTR:  93.6 % (8.6 mo)   Target end date:  Indefinite   Send INR reminders to:  DOUGIE RAVI    Indications    Personal history of DVT (deep vein thrombosis) [Z86.718]           Comments:           Anticoagulation Care Providers     Provider Role Specialty Phone number    Davidson Alegria MD Referring Family Medicine [phone]

## 2022-01-02 ENCOUNTER — MYC REFILL (OUTPATIENT)
Dept: FAMILY MEDICINE | Facility: CLINIC | Age: 57
End: 2022-01-02
Payer: COMMERCIAL

## 2022-01-02 DIAGNOSIS — Z86.718 PERSONAL HISTORY OF DVT (DEEP VEIN THROMBOSIS): ICD-10-CM

## 2022-01-03 RX ORDER — WARFARIN SODIUM 5 MG/1
TABLET ORAL
Qty: 100 TABLET | Refills: 1 | Status: SHIPPED | OUTPATIENT
Start: 2022-01-03 | End: 2022-05-23

## 2022-01-03 NOTE — TELEPHONE ENCOUNTER
Prescription approved per [facility] Refill Protocol.    Rajwinder Bunch RN  [facility]ticoagulation [facility]

## 2022-01-13 ENCOUNTER — ANTICOAGULATION THERAPY VISIT (OUTPATIENT)
Dept: ANTICOAGULATION | Facility: CLINIC | Age: 57
End: 2022-01-13

## 2022-01-13 ENCOUNTER — LAB (OUTPATIENT)
Dept: LAB | Facility: CLINIC | Age: 57
End: 2022-01-13
Payer: COMMERCIAL

## 2022-01-13 DIAGNOSIS — Z86.718 PERSONAL HISTORY OF DVT (DEEP VEIN THROMBOSIS): Primary | ICD-10-CM

## 2022-01-13 DIAGNOSIS — Z86.718 PERSONAL HISTORY OF DVT (DEEP VEIN THROMBOSIS): ICD-10-CM

## 2022-01-13 LAB — INR BLD: 2 (ref 0.9–1.1)

## 2022-01-13 PROCEDURE — 36416 COLLJ CAPILLARY BLOOD SPEC: CPT

## 2022-01-13 PROCEDURE — 85610 PROTHROMBIN TIME: CPT

## 2022-01-13 NOTE — PROGRESS NOTES
ANTICOAGULATION MANAGEMENT     Renée Mcfadden 56 year old female is on warfarin with therapeutic INR result. (Goal INR 2.0-3.0)    Recent labs: (last 7 days)     01/13/22  1436   INR 2.0*       ASSESSMENT     Source(s): Chart Review and Patient/Caregiver Call       Warfarin doses taken: Warfarin taken as instructed    Diet: No new diet changes identified    New illness, injury, or hospitalization: Yes: has one finger that is sore and cracks near nail. She is going to message her provider.     Medication/supplement changes: None noted    Signs or symptoms of bleeding or clotting: No    Previous INR: Therapeutic last visit; previously outside of goal range    Additional findings: None     PLAN     Recommended plan for no diet, medication or health factor changes affecting INR     Dosing Instructions: Continue your current warfarin dose with next INR in 4 weeks       Summary  As of 1/13/2022    Full warfarin instructions:  5 mg every Mon, Wed, Fri; 7.5 mg all other days   Next INR check:  2/8/2022             Telephone call with Rneée who verbalizes understanding and agrees to plan    Lab visit scheduled    Education provided: Please call back if any changes to your diet, medications or how you've been taking warfarin, Importance of notifying clinic for changes in medications; a sooner lab recheck maybe needed., Importance of notifying clinic of upcoming surgeries and procedures 2 weeks in advance and Contact [phone]  with any changes, questions or concerns.     Plan made per [facility] anticoagulation protocol    Mildred Pagan, RN  Anticoagulation Clinic  1/13/2022    _______________________________________________________________________     Anticoagulation Episode Summary     Current INR goal:  2.0-3.0   TTR:  93.6 % (8.7 mo)   Target end date:  Indefinite   Send INR reminders to:  DOUGIE RAVI    Indications    Personal history of DVT (deep vein thrombosis) [Z86.718]           Comments:            Anticoagulation Care Providers     Provider Role Specialty Phone number    Davidson Alegria MD Referring Family Medicine [phone]

## 2022-02-08 ENCOUNTER — LAB (OUTPATIENT)
Dept: LAB | Facility: CLINIC | Age: 57
End: 2022-02-08
Payer: COMMERCIAL

## 2022-02-08 ENCOUNTER — ANTICOAGULATION THERAPY VISIT (OUTPATIENT)
Dept: ANTICOAGULATION | Facility: CLINIC | Age: 57
End: 2022-02-08

## 2022-02-08 DIAGNOSIS — Z86.718 PERSONAL HISTORY OF DVT (DEEP VEIN THROMBOSIS): Primary | ICD-10-CM

## 2022-02-08 DIAGNOSIS — Z86.718 PERSONAL HISTORY OF DVT (DEEP VEIN THROMBOSIS): ICD-10-CM

## 2022-02-08 LAB — INR BLD: 2.5 (ref 0.9–1.1)

## 2022-02-08 PROCEDURE — 85610 PROTHROMBIN TIME: CPT

## 2022-02-08 PROCEDURE — 36415 COLL VENOUS BLD VENIPUNCTURE: CPT

## 2022-02-08 NOTE — PROGRESS NOTES
ANTICOAGULATION MANAGEMENT     Renée Mcfadden 56 year old female is on warfarin with therapeutic INR result. (Goal INR 2.0-3.0)    Recent labs: (last 7 days)     02/08/22  1504   INR 2.5*       ASSESSMENT     Source(s): Chart Review and Patient/Caregiver Call       Warfarin doses taken: Warfarin taken as instructed    Diet: No new diet changes identified    New illness, injury, or hospitalization: Yes: diagnosed with cellulitis in her finger. Was told cellulitis and keflex ordered.  States finger is red and painful.  Patient had RX sent to [facility] mail order so has not received medication yet.  Advised to follow up with an INR approx 5-7 days after starting the medication.      Medication/supplement changes: Keflex will start once received from mail order, states is a 10 day course may increase risk of bleeding, but not expected to affect INR    Signs or symptoms of bleeding or clotting: No    Previous INR: Therapeutic last 2(+) visits    Additional findings: will monitor closer due to infection/inflammation can also affect INR      PLAN     Recommended plan for temporary change(s) affecting INR     Dosing Instructions: Continue your current warfarin dose with next INR in 2 weeks       Summary  As of 2/8/2022    Full warfarin instructions:  5 mg every Mon, Wed, Fri; 7.5 mg all other days   Next INR check:  3/15/2022             Telephone call with Renée who verbalizes understanding and agrees to plan    Patient offered & declined to schedule next visit she will reschedule INR once she receives her antibiotics.     Education provided: Goal range and significance of current result, Monitoring for bleeding signs and symptoms, Monitoring for clotting signs and symptoms, When to seek medical attention/emergency care and Contact [phone]  with any changes, questions or concerns.     Plan made per [facility] anticoagulation protocol    Rajwinder Bunch, RN  Anticoagulation  Clinic  2/8/2022    _______________________________________________________________________     Anticoagulation Episode Summary     Current INR goal:  2.0-3.0   TTR:  93.6 % (8.7 mo)   Target end date:  Indefinite   Send INR reminders to:  DOUGIE RAVI    Indications    Personal history of DVT (deep vein thrombosis) [Z86.718]           Comments:           Anticoagulation Care Providers     Provider Role Specialty Phone number    Davidson Alegria MD Referring Family Medicine [phone]

## 2022-02-19 ENCOUNTER — HEALTH MAINTENANCE LETTER (OUTPATIENT)
Age: 57
End: 2022-02-19

## 2022-02-28 NOTE — PROGRESS NOTES
Anticoagulation clinic notificiation    Dr. Alegria,    Your patient, Renée Mcfadden,  is past due for an INR check  The patient s last INR was 2.1 on 8/13/21 and was due to come back on 9/24/21.    Renée Mcfadden received phone calls and letters over the last several weeks in attempt to arrange a follow up appointment.  Renée Mcfadden will be sent another letter today.     No action is required from you unless you have additional information or if you would like to reach out personally to Renée Mcfadden.    Please don t hesitate to contact the Anticoagulation Management Program at [phone] if you have any questions or concerns.     Sincerely,     [facility] Anticoagulation Management Program   182.1

## 2022-03-22 ENCOUNTER — TELEPHONE (OUTPATIENT)
Dept: ANTICOAGULATION | Facility: CLINIC | Age: 57
End: 2022-03-22
Payer: COMMERCIAL

## 2022-03-22 NOTE — TELEPHONE ENCOUNTER
ANTICOAGULATION     Renée Mcfadden is overdue for INR check.      Was unable to reach patient and was unable to leave a voicemail. If patient calls, please schedule INR check as soon as possible.     Farrah Lopez RN

## 2022-03-29 ENCOUNTER — TELEPHONE (OUTPATIENT)
Dept: ANTICOAGULATION | Facility: CLINIC | Age: 57
End: 2022-03-29
Payer: COMMERCIAL

## 2022-03-29 NOTE — TELEPHONE ENCOUNTER
ANTICOAGULATION     Renée Mcfadden is overdue for INR check.      Was unable to reach patient and was unable to leave a voicemail. If patient calls, please schedule INR check as soon as possible.  and Reminder letter sent    Nataly Simeon RN

## 2022-04-13 ENCOUNTER — DOCUMENTATION ONLY (OUTPATIENT)
Dept: ANTICOAGULATION | Facility: CLINIC | Age: 57
End: 2022-04-13
Payer: COMMERCIAL

## 2022-04-13 NOTE — LETTER
April 13, 2022      Renée Mcfadden  [address] [place] [facility] [address]      Dear Renée,    You are currently under the care of [facility] Anticoagulation Management Program for your warfarin (Coumadin ) therapy.  We are contacting you because our records show you were due for an INR on 3/15.    There are potentially serious risks when taking warfarin without careful monitoring and we want to make sure you are safely managed.  Routine INR monitoring is required for warfarin refills.     Please call [phone]  as soon as possible to schedule an appointment.  If there has been a change in your care or other concerns, please let us know so we can help and or update our records.     Sincerely,       [facility] Anticoagulation Management Program

## 2022-04-16 ENCOUNTER — HEALTH MAINTENANCE LETTER (OUTPATIENT)
Age: 57
End: 2022-04-16

## 2022-04-27 ENCOUNTER — TELEPHONE (OUTPATIENT)
Dept: FAMILY MEDICINE | Facility: CLINIC | Age: 57
End: 2022-04-27
Payer: COMMERCIAL

## 2022-04-27 NOTE — TELEPHONE ENCOUNTER
Anticoagulation clinic notificiation    Dr. Alegria,    Your patient, Renée Mcfadden,  is past due for an INR check  The patient s last INR was 2.5 on 2/8/22 and was due to come back on 3/15/22.    Renée Mcfadden received phone calls and letters over the last several weeks in attempt to arrange a follow up appointment.  Renée Mcfadden will be sent another letter today.     No action is required from you unless you have additional information or if you would like to reach out personally to Renée Mcfadden.    Please don t hesitate to contact the Anticoagulation Management Program at [phone] if you have any questions or concerns.     Sincerely,     [facility] Anticoagulation Management Program

## 2022-04-27 NOTE — LETTER
April 27, 2022      Renée Mcfadden  [address] [place] [facility] [address]      Dear Renée,    You are currently under the care of [facility] Anticoagulation Management Program for your warfarin (Coumadin ) therapy.  We are contacting you because our records show you were due for an INR on 3/15/22.    There are potentially serious risks when taking warfarin without careful monitoring and we want to make sure you are safely managed.  Routine INR monitoring is required for warfarin refills.     Please call [phone]  as soon as possible to schedule an appointment.  If there has been a change in your care or other concerns, please let us know so we can help and or update our records.     Sincerely,       [facility] Anticoagulation Management Program

## 2022-05-20 DIAGNOSIS — I10 BENIGN ESSENTIAL HYPERTENSION: ICD-10-CM

## 2022-05-20 DIAGNOSIS — Z86.718 PERSONAL HISTORY OF DVT (DEEP VEIN THROMBOSIS): ICD-10-CM

## 2022-05-22 NOTE — TELEPHONE ENCOUNTER
"Routing refill request to provider for review/approval because:  Labs not current:  Cr.  It has been a year since last visit  Patient is past due for INR check, Anticoag has attempted to reach patient but was unsuccessful-see 4/27/22 encounter      Requested Prescriptions   Pending Prescriptions Disp Refills     NIFEdipine ER OSMOTIC (PROCARDIA XL) 60 MG 24 hr tablet [Pharmacy Med Name: NIFEDIPIN XL TAB 60MG ER] 90 tablet 0     Sig: TAKE 1 TABLET DAILY       Calcium Channel Blockers Protocol  Failed - 5/20/2022  9:24 PM        Failed - Blood pressure under 140/90 in past 12 months     BP Readings from Last 3 Encounters:   05/13/21 138/80   10/22/20 135/85   10/09/20 (!) 157/98                 Failed - Recent (12 mo) or future (30 days) visit within the authorizing provider's specialty     Patient has had an office visit with the authorizing provider or a provider within the authorizing providers department within the previous 12 mos or has a future within next 30 days. See \"Patient Info\" tab in inbasket, or \"Choose Columns\" in Meds & Orders section of the refill encounter.              Failed - Normal serum creatinine on file in past 12 months     Recent Labs   Lab Test 05/13/21  0737   CR 0.75       Ok to refill medication if creatinine is low          Passed - Medication is active on med list        Passed - Patient is age 18 or older        Passed - No active pregnancy on record        Passed - No positive pregnancy test in past 12 months           warfarin ANTICOAGULANT (JANTOVEN ANTICOAGULANT) 5 MG tablet [Pharmacy Med Name: JANTOVEN TAB 5MG] 90 tablet 1     Sig: TAKE 1 AND 1/2 TABLETS (7.5MG) ON MONDAY, WEDNESDAY, AND FRIDAY AND 1 TABLET ALL OTHER DAYS OF THE WEEK       Vitamin K Antagonists Failed - 5/20/2022  9:24 PM        Failed - Recent (12 mo) or future (30 days) visit within the authorizing provider's specialty     Patient has had an office visit with the authorizing provider or a provider within the " "authorizing providers department within the previous 12 mos or has a future within next 30 days. See \"Patient Info\" tab in inbasket, or \"Choose Columns\" in Meds & Orders section of the refill encounter.              Failed - INR is within goal in the past 6 weeks     Confirm INR is within goal in the past 6 weeks.     Recent Labs   Lab Test 02/08/22  1504   INR 2.5*                       Passed - Medication is active on med list        Passed - Patient is 18 years of age or older        Passed - Patient is not pregnant        Passed - No positive pregnancy on file in past 12 months           Sharmin Yun RN    "

## 2022-05-23 RX ORDER — NIFEDIPINE 60 MG/1
TABLET, EXTENDED RELEASE ORAL
Qty: 90 TABLET | Refills: 0 | Status: SHIPPED | OUTPATIENT
Start: 2022-05-23 | End: 2022-08-07

## 2022-05-23 RX ORDER — WARFARIN SODIUM 5 MG/1
TABLET ORAL
Qty: 90 TABLET | Refills: 0 | Status: SHIPPED | OUTPATIENT
Start: 2022-05-23 | End: 2022-08-07

## 2022-05-25 ENCOUNTER — TELEPHONE (OUTPATIENT)
Dept: ANTICOAGULATION | Facility: CLINIC | Age: 57
End: 2022-05-25
Payer: COMMERCIAL

## 2022-05-25 NOTE — TELEPHONE ENCOUNTER
ANTICOAGULATION MANAGEMENT PROGRAM    Dr. Alegria,     Our records indicate that Renée Mcfadden remains past due to check INR. Renée Mcfadden was contacted multiple times over at least the last 8 weeks to attempt to arrange a follow up appointment.    Renée Mcfadden last had an INR checked on 2/8/22 and was due for follow up on 3/15/22     Called patient,Spoke with patient and scheduled lab appointment on 5/27     At this time Renée Mcfadden will be moved to noncompliant status within the program until their referral expires on 11/26/22. While in noncompliant status the patient would continue to be contacted every 6 weeks by the anticoagulation program to attempt to schedule patient. You will be notified of each contact attempt to make you aware of patient's ongoing noncompliance.        Thank you,     [facility] Anticoagulation Management Program

## 2022-05-27 ENCOUNTER — LAB (OUTPATIENT)
Dept: LAB | Facility: CLINIC | Age: 57
End: 2022-05-27
Payer: COMMERCIAL

## 2022-05-27 ENCOUNTER — ANTICOAGULATION THERAPY VISIT (OUTPATIENT)
Dept: ANTICOAGULATION | Facility: CLINIC | Age: 57
End: 2022-05-27

## 2022-05-27 DIAGNOSIS — Z86.718 PERSONAL HISTORY OF DVT (DEEP VEIN THROMBOSIS): ICD-10-CM

## 2022-05-27 DIAGNOSIS — Z86.718 PERSONAL HISTORY OF DVT (DEEP VEIN THROMBOSIS): Primary | ICD-10-CM

## 2022-05-27 LAB — INR BLD: 2.5 (ref 0.9–1.1)

## 2022-05-27 PROCEDURE — 85610 PROTHROMBIN TIME: CPT

## 2022-05-27 PROCEDURE — 36416 COLLJ CAPILLARY BLOOD SPEC: CPT

## 2022-05-27 NOTE — PROGRESS NOTES
ANTICOAGULATION MANAGEMENT     Renée Mcfadden 56 year old female is on warfarin with therapeutic INR result. (Goal INR 2.0-3.0)    Recent labs: (last 7 days)     05/27/22  1352   INR 2.5*       ASSESSMENT       Source(s): Chart Review    Previous INR was Therapeutic last 2(+) visits    Medication, diet, health changes since last INR chart reviewed; none identified           PLAN     Unable to reach [facility] today.    No instructions provided. Unable to leave voicemail.    Follow up required to confirm warfarin dose taken and assess for changes    Farrah Lopez RN  Anticoagulation Clinic  5/27/2022

## 2022-05-27 NOTE — PROGRESS NOTES
ANTICOAGULATION MANAGEMENT     Renée Mcfadden 56 year old female is on warfarin with therapeutic INR result. (Goal INR 2.0-3.0)    Recent labs: (last 7 days)     05/27/22  1352   INR 2.5*       ASSESSMENT       Source(s): Chart Review and Patient/Caregiver Call       Warfarin doses taken: Warfarin taken as instructed    Diet: No new diet changes identified    New illness, injury, or hospitalization: No    Medication/supplement changes: None noted    Signs or symptoms of bleeding or clotting: No    Previous INR: Therapeutic last 2(+) visits    Additional findings: None       PLAN     Recommended plan for no diet, medication or health factor changes affecting INR     Dosing Instructions: continue your current warfarin dose with next INR in 6 weeks       Summary  As of 5/27/2022    Full warfarin instructions:  5 mg every Mon, Wed, Fri; 7.5 mg all other days   Next INR check:  7/8/2022             Telephone call with Geraldine who verbalizes understanding and agrees to plan    Lab visit scheduled    Education provided: Goal range and significance of current result    Plan made per [facility] anticoagulation protocol    Farrah Lopez RN  Anticoagulation Clinic  5/27/2022    _______________________________________________________________________     Anticoagulation Episode Summary     Current INR goal:  2.0-3.0   TTR:  89.1 % (5.1 mo)   Target end date:  Indefinite   Send INR reminders to:  DOUGIE RAVI    Indications    Personal history of DVT (deep vein thrombosis) [Z86.718]           Comments:           Anticoagulation Care Providers     Provider Role Specialty Phone number    Davidson Alegria MD Referring Family Medicine [phone]

## 2022-07-08 ENCOUNTER — LAB (OUTPATIENT)
Dept: LAB | Facility: CLINIC | Age: 57
End: 2022-07-08
Payer: COMMERCIAL

## 2022-07-08 ENCOUNTER — ANTICOAGULATION THERAPY VISIT (OUTPATIENT)
Dept: ANTICOAGULATION | Facility: CLINIC | Age: 57
End: 2022-07-08

## 2022-07-08 DIAGNOSIS — Z86.718 PERSONAL HISTORY OF DVT (DEEP VEIN THROMBOSIS): Primary | ICD-10-CM

## 2022-07-08 DIAGNOSIS — Z86.718 PERSONAL HISTORY OF DVT (DEEP VEIN THROMBOSIS): ICD-10-CM

## 2022-07-08 LAB — INR BLD: 3.1 (ref 0.9–1.1)

## 2022-07-08 PROCEDURE — 36416 COLLJ CAPILLARY BLOOD SPEC: CPT

## 2022-07-08 PROCEDURE — 85610 PROTHROMBIN TIME: CPT

## 2022-07-08 NOTE — PROGRESS NOTES
ANTICOAGULATION MANAGEMENT     Renée Mcfadden 56 year old female is on warfarin with supratherapeutic INR result. (Goal INR 2.0-3.0)    Recent labs: (last 7 days)     07/08/22  0954   INR 3.1*       ASSESSMENT       Source(s): Chart Review and Patient/Caregiver Call       Warfarin doses taken: Warfarin taken as instructed and may have taken an extra 1/2 tablet    Diet: No new diet changes identified    New illness, injury, or hospitalization: No    Medication/supplement changes: None noted    Signs or symptoms of bleeding or clotting: No    Previous INR: Therapeutic last 2(+) visits    Additional findings: None       PLAN     Recommended plan for temporary change(s) affecting INR     Dosing Instructions: continue your current warfarin dose with next INR in 2 weeks       Summary  As of 7/8/2022    Full warfarin instructions:  5 mg every Mon, Wed, Fri; 7.5 mg all other days   Next INR check:  7/22/2022             Telephone call with Geraldine who verbalizes understanding and agrees to plan    Lab visit scheduled    Education provided: Goal range and significance of current result    Plan made per [facility] anticoagulation protocol    Farrah Lopez RN  Anticoagulation Clinic  7/8/2022    _______________________________________________________________________     Anticoagulation Episode Summary     Current INR goal:  2.0-3.0   TTR:  84.5 % (5.1 mo)   Target end date:  Indefinite   Send INR reminders to:  DOUGIE RAVI    Indications    Personal history of DVT (deep vein thrombosis) [Z86.718]           Comments:           Anticoagulation Care Providers     Provider Role Specialty Phone number    Davidson Alegria MD Referring Family Medicine [phone]

## 2022-07-08 NOTE — PROGRESS NOTES
ANTICOAGULATION MANAGEMENT     Renée Mcfadden 56 year old female is on warfarin with supratherapeutic INR result. (Goal INR 2.0-3.0)    Recent labs: (last 7 days)     07/08/22  0954   INR 3.1*       ASSESSMENT       Source(s): Chart Review    Previous INR was Therapeutic last 2(+) visits    Medication, diet, health changes since last INR chart reviewed; none identified           PLAN     Unable to reach [facility] today.    No instructions provided. Unable to leave voicemail.    Follow up required to confirm warfarin dose taken and assess for changes and discuss out of range result     Farrah Lopez RN  Anticoagulation Clinic  7/8/2022

## 2022-07-13 DIAGNOSIS — I82.4Y9 DEEP VEIN THROMBOSIS (DVT) OF PROXIMAL LOWER EXTREMITY, UNSPECIFIED CHRONICITY, UNSPECIFIED LATERALITY (H): Primary | ICD-10-CM

## 2022-07-22 ENCOUNTER — LAB (OUTPATIENT)
Dept: LAB | Facility: CLINIC | Age: 57
End: 2022-07-22
Payer: COMMERCIAL

## 2022-07-22 ENCOUNTER — ANTICOAGULATION THERAPY VISIT (OUTPATIENT)
Dept: ANTICOAGULATION | Facility: CLINIC | Age: 57
End: 2022-07-22

## 2022-07-22 DIAGNOSIS — I82.4Y9 DEEP VEIN THROMBOSIS (DVT) OF PROXIMAL LOWER EXTREMITY, UNSPECIFIED CHRONICITY, UNSPECIFIED LATERALITY (H): ICD-10-CM

## 2022-07-22 DIAGNOSIS — Z86.718 PERSONAL HISTORY OF DVT (DEEP VEIN THROMBOSIS): Primary | ICD-10-CM

## 2022-07-22 LAB — INR BLD: 2.8 (ref 0.9–1.1)

## 2022-07-22 PROCEDURE — 36416 COLLJ CAPILLARY BLOOD SPEC: CPT

## 2022-07-22 PROCEDURE — 85610 PROTHROMBIN TIME: CPT

## 2022-07-22 NOTE — PROGRESS NOTES
ANTICOAGULATION MANAGEMENT     Renée Mcfadden 56 year old female is on warfarin with therapeutic INR result. (Goal INR 2.0-3.0)    Recent labs: (last 7 days)     07/22/22  0842   INR 2.8*       ASSESSMENT       Source(s): Chart Review and Patient/Caregiver Call       Warfarin doses taken: Warfarin taken as instructed    Diet: No new diet changes identified    New illness, injury, or hospitalization: No    Medication/supplement changes: None noted    Signs or symptoms of bleeding or clotting: No    Previous INR: Supratherapeutic    Additional findings: None       PLAN     Recommended plan for no diet, medication or health factor changes affecting INR     Dosing Instructions: continue your current warfarin dose with next INR in 4 weeks       Summary  As of 7/22/2022    Full warfarin instructions:  5 mg every Mon, Wed, Fri; 7.5 mg all other days   Next INR check:  8/19/2022             Telephone call with Geraldine who verbalizes understanding and agrees to plan    Lab visit scheduled    Education provided: Goal range and significance of current result    Plan made per [facility] anticoagulation protocol    Farrah Lopez RN  Anticoagulation Clinic  7/22/2022    _______________________________________________________________________     Anticoagulation Episode Summary     Current INR goal:  2.0-3.0   TTR:  81.4 % (5.1 mo)   Target end date:  Indefinite   Send INR reminders to:  DOUGIE RAVI    Indications    Personal history of DVT (deep vein thrombosis) [Z86.718]           Comments:           Anticoagulation Care Providers     Provider Role Specialty Phone number    Davidson Alegria MD Referring Family Medicine [phone]

## 2022-08-19 ENCOUNTER — ANTICOAGULATION THERAPY VISIT (OUTPATIENT)
Dept: ANTICOAGULATION | Facility: CLINIC | Age: 57
End: 2022-08-19

## 2022-08-19 ENCOUNTER — LAB (OUTPATIENT)
Dept: LAB | Facility: CLINIC | Age: 57
End: 2022-08-19
Payer: COMMERCIAL

## 2022-08-19 DIAGNOSIS — I82.4Y9 DEEP VEIN THROMBOSIS (DVT) OF PROXIMAL LOWER EXTREMITY, UNSPECIFIED CHRONICITY, UNSPECIFIED LATERALITY (H): ICD-10-CM

## 2022-08-19 DIAGNOSIS — Z86.718 PERSONAL HISTORY OF DVT (DEEP VEIN THROMBOSIS): Primary | ICD-10-CM

## 2022-08-19 LAB — INR BLD: 3.7 (ref 0.9–1.1)

## 2022-08-19 PROCEDURE — 85610 PROTHROMBIN TIME: CPT

## 2022-08-19 PROCEDURE — 36415 COLL VENOUS BLD VENIPUNCTURE: CPT

## 2022-08-19 NOTE — PROGRESS NOTES
ANTICOAGULATION MANAGEMENT     Renée Mcfadden 56 year old female is on warfarin with supratherapeutic INR result. (Goal INR 2.0-3.0)    Recent labs: (last 7 days)     08/19/22  0739   INR 3.7*       ASSESSMENT       Source(s): Chart Review and Patient/Caregiver Call       Warfarin doses taken: Warfarin taken as instructed    Diet: No new diet changes identified    New illness, injury, or hospitalization: Yes: has an infected finger    Medication/supplement changes: None noted    Signs or symptoms of bleeding or clotting: No    Previous INR: Therapeutic last visit; previously outside of goal range    Additional findings: None       PLAN     Recommended plan for temporary change(s) affecting INR     Dosing Instructions: hold dose then continue your current warfarin dose with next INR in 2 weeks       Summary  As of 8/19/2022    Full warfarin instructions:  8/19: Hold; Otherwise 5 mg every Mon, Wed, Fri; 7.5 mg all other days   Next INR check:  9/2/2022             Telephone call with Geraldine who verbalizes understanding and agrees to plan    Patient elected to schedule next visit in 3 weeks due to being out of town    Education provided: Goal range and significance of current result    Plan made per [facility] anticoagulation protocol    Farrah Lopez RN  Anticoagulation Clinic  8/19/2022    _______________________________________________________________________     Anticoagulation Episode Summary     Current INR goal:  2.0-3.0   TTR:  68.3 % (5.3 mo)   Target end date:  Indefinite   Send INR reminders to:  DOUGIE RAVI    Indications    Personal history of DVT (deep vein thrombosis) [Z86.718]           Comments:           Anticoagulation Care Providers     Provider Role Specialty Phone number    Davidson Alegria MD Referring Family Medicine [phone]

## 2022-09-09 ENCOUNTER — ANTICOAGULATION THERAPY VISIT (OUTPATIENT)
Dept: ANTICOAGULATION | Facility: CLINIC | Age: 57
End: 2022-09-09

## 2022-09-09 ENCOUNTER — LAB (OUTPATIENT)
Dept: LAB | Facility: CLINIC | Age: 57
End: 2022-09-09
Payer: COMMERCIAL

## 2022-09-09 DIAGNOSIS — I82.4Y9 DEEP VEIN THROMBOSIS (DVT) OF PROXIMAL LOWER EXTREMITY, UNSPECIFIED CHRONICITY, UNSPECIFIED LATERALITY (H): ICD-10-CM

## 2022-09-09 DIAGNOSIS — Z86.718 PERSONAL HISTORY OF DVT (DEEP VEIN THROMBOSIS): Primary | ICD-10-CM

## 2022-09-09 LAB — INR BLD: 2 (ref 0.9–1.1)

## 2022-09-09 PROCEDURE — 36416 COLLJ CAPILLARY BLOOD SPEC: CPT

## 2022-09-09 PROCEDURE — 85610 PROTHROMBIN TIME: CPT

## 2022-09-09 NOTE — PROGRESS NOTES
ANTICOAGULATION MANAGEMENT     Renée Mcfadden 56 year old female is on warfarin with therapeutic INR result. (Goal INR 2.0-3.0)    Recent labs: (last 7 days)     09/09/22  0741   INR 2.0*       ASSESSMENT       Source(s): Chart Review and Patient/Caregiver Call       Warfarin doses taken: Warfarin taken as instructed    Diet: No new diet changes identified    New illness, injury, or hospitalization: No    Medication/supplement changes: None noted    Signs or symptoms of bleeding or clotting: No    Previous INR: Supratherapeutic    Additional findings: None       PLAN     Recommended plan for no diet, medication or health factor changes affecting INR     Dosing Instructions: Continue your current warfarin dose with next INR in 4 weeks       Summary  As of 9/9/2022    Full warfarin instructions:  5 mg every Mon, Wed, Fri; 7.5 mg all other days   Next INR check:  10/7/2022             Telephone call with Geraldine who verbalizes understanding and agrees to plan    Lab visit scheduled    Education provided: Please call back if any changes to your diet, medications or how you've been taking warfarin    Plan made per [facility] anticoagulation protocol    Jolynn Saba RN  Anticoagulation Clinic  9/9/2022    _______________________________________________________________________     Anticoagulation Episode Summary     Current INR goal:  2.0-3.0   TTR:  67.2 % (6 mo)   Target end date:  Indefinite   Send INR reminders to:  DOUGIE RAVI    Indications    Personal history of DVT (deep vein thrombosis) [Z86.718]           Comments:           Anticoagulation Care Providers     Provider Role Specialty Phone number    Davidson Alegria MD Referring Family Medicine [phone]

## 2022-10-07 ENCOUNTER — ANTICOAGULATION THERAPY VISIT (OUTPATIENT)
Dept: ANTICOAGULATION | Facility: CLINIC | Age: 57
End: 2022-10-07

## 2022-10-07 ENCOUNTER — LAB (OUTPATIENT)
Dept: LAB | Facility: CLINIC | Age: 57
End: 2022-10-07
Payer: COMMERCIAL

## 2022-10-07 DIAGNOSIS — Z86.718 PERSONAL HISTORY OF DVT (DEEP VEIN THROMBOSIS): Primary | ICD-10-CM

## 2022-10-07 DIAGNOSIS — I82.4Y9 DEEP VEIN THROMBOSIS (DVT) OF PROXIMAL LOWER EXTREMITY, UNSPECIFIED CHRONICITY, UNSPECIFIED LATERALITY (H): ICD-10-CM

## 2022-10-07 LAB — INR BLD: 2.4 (ref 0.9–1.1)

## 2022-10-07 PROCEDURE — 36416 COLLJ CAPILLARY BLOOD SPEC: CPT

## 2022-10-07 PROCEDURE — 85610 PROTHROMBIN TIME: CPT

## 2022-10-07 NOTE — PROGRESS NOTES
ANTICOAGULATION MANAGEMENT     Renée Mcfadden 57 year old female is on warfarin with therapeutic INR result. (Goal INR 2.0-3.0)    Recent labs: (last 7 days)     10/07/22  0735   INR 2.4*       ASSESSMENT       Source(s): Chart Review and Patient/Caregiver Call       Warfarin doses taken: Missed dose(s) may be affecting INR    Diet: No new diet changes identified    New illness, injury, or hospitalization: No    Medication/supplement changes: None noted    Signs or symptoms of bleeding or clotting: No    Previous INR: Therapeutic last visit; previously outside of goal range    Additional findings: None       PLAN     Recommended plan for temporary change(s) affecting INR     Dosing Instructions: Continue your current warfarin dose with next INR in 5 weeks       Summary  As of 10/7/2022    Full warfarin instructions:  5 mg every Mon, Wed, Fri; 7.5 mg all other days   Next INR check:  11/11/2022             Telephone call with Geraldine who verbalizes understanding and agrees to plan    Lab visit scheduled    Education provided: Goal range and significance of current result, Importance of therapeutic range, Importance of following up at instructed interval, Importance of notifying clinic for changes in medications; a sooner lab recheck maybe needed. and Contact [phone]  with any changes, questions or concerns.     Plan made per [facility] anticoagulation protocol    Alyssa Montana RN  Anticoagulation Clinic  10/7/2022    _______________________________________________________________________     Anticoagulation Episode Summary     Current INR goal:  2.0-3.0   TTR:  71.6 % (6.9 mo)   Target end date:  Indefinite   Send INR reminders to:  DOUGIE RAVI    Indications    Personal history of DVT (deep vein thrombosis) [Z86.578]           Comments:           Anticoagulation Care Providers     Provider Role Specialty Phone number    Davidson Alegria MD Referring Family Medicine [phone]

## 2022-10-22 ENCOUNTER — HEALTH MAINTENANCE LETTER (OUTPATIENT)
Age: 57
End: 2022-10-22

## 2022-10-28 ENCOUNTER — OFFICE VISIT (OUTPATIENT)
Dept: FAMILY MEDICINE | Facility: CLINIC | Age: 57
End: 2022-10-28
Payer: COMMERCIAL

## 2022-10-28 VITALS
DIASTOLIC BLOOD PRESSURE: 66 MMHG | HEART RATE: 88 BPM | TEMPERATURE: 98.7 F | BODY MASS INDEX: 30.93 KG/M2 | HEIGHT: 64 IN | WEIGHT: 181.2 LBS | OXYGEN SATURATION: 98 % | SYSTOLIC BLOOD PRESSURE: 138 MMHG

## 2022-10-28 DIAGNOSIS — Z12.11 SCREEN FOR COLON CANCER: ICD-10-CM

## 2022-10-28 DIAGNOSIS — Z12.31 VISIT FOR SCREENING MAMMOGRAM: ICD-10-CM

## 2022-10-28 DIAGNOSIS — Z12.83 SKIN CANCER SCREENING: ICD-10-CM

## 2022-10-28 DIAGNOSIS — R01.1 HEART MURMUR: ICD-10-CM

## 2022-10-28 DIAGNOSIS — Z11.4 SCREENING FOR HIV (HUMAN IMMUNODEFICIENCY VIRUS): ICD-10-CM

## 2022-10-28 DIAGNOSIS — Z00.00 ROUTINE GENERAL MEDICAL EXAMINATION AT A HEALTH CARE FACILITY: Primary | ICD-10-CM

## 2022-10-28 DIAGNOSIS — I10 BENIGN ESSENTIAL HYPERTENSION: ICD-10-CM

## 2022-10-28 DIAGNOSIS — Z12.4 CERVICAL CANCER SCREENING: ICD-10-CM

## 2022-10-28 PROCEDURE — 99213 OFFICE O/P EST LOW 20 MIN: CPT | Mod: 25 | Performed by: PHYSICIAN ASSISTANT

## 2022-10-28 PROCEDURE — 99396 PREV VISIT EST AGE 40-64: CPT | Performed by: PHYSICIAN ASSISTANT

## 2022-10-28 RX ORDER — NIFEDIPINE 60 MG/1
60 TABLET, EXTENDED RELEASE ORAL DAILY
Qty: 90 TABLET | Refills: 3 | Status: SHIPPED | OUTPATIENT
Start: 2022-10-28 | End: 2023-11-29

## 2022-10-28 ASSESSMENT — ENCOUNTER SYMPTOMS
CONSTIPATION: 0
ARTHRALGIAS: 1
NERVOUS/ANXIOUS: 1
COUGH: 0
NAUSEA: 0
DIZZINESS: 0
HEMATOCHEZIA: 0
SORE THROAT: 0
ABDOMINAL PAIN: 0
FREQUENCY: 0
CHILLS: 1
SHORTNESS OF BREATH: 0
JOINT SWELLING: 0
DYSURIA: 0
FEVER: 0
PALPITATIONS: 0
DIARRHEA: 0
HEMATURIA: 0
HEADACHES: 0
BREAST MASS: 0
EYE PAIN: 0
MYALGIAS: 1
HEARTBURN: 0
PARESTHESIAS: 0
WEAKNESS: 1
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
## 2022-10-28 NOTE — PATIENT INSTRUCTIONS
DERMATOLOGY- will call you.     Call and schedule the echocardiogram [phone] and mammogram.       I recommend that everyone age 6 months or older receives the flu shot every year. Please schedule with our medical assistants for your flu vaccine. All local pharmacies also vaccinate adults for the flu. Please speak to your pharmacist about receiving this vaccine. Egg free versions are available to those with allergies.        I recommend that you get the shingles vaccine. The shingles vaccine is a 2 vaccine series that can be completed at any of the local pharmacies.      Consider a COVID booster.           Patient Education    Preventive Health Recommendations  Female Ages 50 - 64    Yearly exam: See your health care provider every year in order to  Review health changes.   Discuss preventive care.    Review your medicines if your doctor has prescribed any.    Get a Pap test every three years (unless you have an abnormal result and your provider advises testing more often).  If you get Pap tests with HPV test, you only need to test every 5 years, unless you have an abnormal result.   You do not need a Pap test if your uterus was removed (hysterectomy) and you have not had cancer.  You should be tested each year for STDs (sexually transmitted diseases) if you're at risk.   Have a mammogram every 1 to 2 years.  Have a colonoscopy at age 50, or have a yearly FIT test (stool test). These exams screen for colon cancer.    Have a cholesterol test every 5 years, or more often if advised.  Have a diabetes test (fasting glucose) every three years. If you are at risk for diabetes, you should have this test more often.   If you are at risk for osteoporosis (brittle bone disease), think about having a bone density scan (DEXA).    Shots: Get a flu shot each year. Get a tetanus shot every 10 years.    Nutrition:   Eat at least 5 servings of fruits and vegetables each day.  Eat whole-grain bread, whole-wheat pasta and brown  rice instead of white grains and rice.  Get adequate Calcium and Vitamin D.     Lifestyle  Exercise at least 150 minutes a week (30 minutes a day, 5 days a week). This will help you control your weight and prevent disease.  Limit alcohol to one drink per day.  No smoking.   Wear sunscreen to prevent skin cancer.   See your dentist every six months for an exam and cleaning.  See your eye doctor every 1 to 2 years.

## 2022-11-11 ENCOUNTER — LAB (OUTPATIENT)
Dept: LAB | Facility: CLINIC | Age: 57
End: 2022-11-11
Payer: COMMERCIAL

## 2022-11-11 ENCOUNTER — ANCILLARY PROCEDURE (OUTPATIENT)
Dept: CARDIOLOGY | Facility: CLINIC | Age: 57
End: 2022-11-11
Attending: PHYSICIAN ASSISTANT
Payer: COMMERCIAL

## 2022-11-11 ENCOUNTER — ANTICOAGULATION THERAPY VISIT (OUTPATIENT)
Dept: ANTICOAGULATION | Facility: CLINIC | Age: 57
End: 2022-11-11

## 2022-11-11 DIAGNOSIS — R01.1 HEART MURMUR: ICD-10-CM

## 2022-11-11 DIAGNOSIS — I10 BENIGN ESSENTIAL HYPERTENSION: ICD-10-CM

## 2022-11-11 DIAGNOSIS — Z86.718 PERSONAL HISTORY OF DVT (DEEP VEIN THROMBOSIS): Primary | ICD-10-CM

## 2022-11-11 DIAGNOSIS — I82.4Y9 DEEP VEIN THROMBOSIS (DVT) OF PROXIMAL LOWER EXTREMITY, UNSPECIFIED CHRONICITY, UNSPECIFIED LATERALITY (H): ICD-10-CM

## 2022-11-11 LAB
CHOLEST SERPL-MCNC: 204 MG/DL
FASTING STATUS PATIENT QL REPORTED: YES
HDLC SERPL-MCNC: 75 MG/DL
INR BLD: 3.5 (ref 0.9–1.1)
LDLC SERPL CALC-MCNC: 115 MG/DL
LVEF ECHO: NORMAL
NONHDLC SERPL-MCNC: 129 MG/DL
TRIGL SERPL-MCNC: 70 MG/DL

## 2022-11-11 PROCEDURE — 36415 COLL VENOUS BLD VENIPUNCTURE: CPT

## 2022-11-11 PROCEDURE — 85610 PROTHROMBIN TIME: CPT

## 2022-11-11 PROCEDURE — 80061 LIPID PANEL: CPT

## 2022-11-11 PROCEDURE — 93306 TTE W/DOPPLER COMPLETE: CPT | Performed by: INTERNAL MEDICINE

## 2022-11-14 PROBLEM — I35.0 MILD AORTIC STENOSIS: Status: ACTIVE | Noted: 2022-11-14

## 2022-11-14 PROBLEM — I05.0 MILD MITRAL STENOSIS: Status: ACTIVE | Noted: 2022-11-14

## 2022-11-14 NOTE — RESULT ENCOUNTER NOTE
Prateek Chandler,     The ultrasound of the heart did show some changes around the mitral valve and the aortic valve. These changes are likely causing the new murmur. I would recommend a repeat ultrasound in 2-3 years.   Sena Sage PA-C

## 2022-11-21 ENCOUNTER — TELEPHONE (OUTPATIENT)
Dept: FAMILY MEDICINE | Facility: CLINIC | Age: 57
End: 2022-11-21

## 2022-11-21 DIAGNOSIS — E78.5 HYPERLIPIDEMIA LDL GOAL <100: Primary | ICD-10-CM

## 2022-11-21 RX ORDER — SIMVASTATIN 20 MG
20 TABLET ORAL AT BEDTIME
Qty: 90 TABLET | Refills: 1 | Status: SHIPPED | OUTPATIENT
Start: 2022-11-21 | End: 2023-02-09

## 2022-11-21 NOTE — TELEPHONE ENCOUNTER
----- Message from Jenifer Hennessy RN sent at 11/21/2022 12:00 PM CST -----  Spoke with pt. Gave her provider's message as written. Pt is interested in starting a medication. Uses [employer] mail order. Please advise.     Jenifer Hennessy RN  [facility]

## 2022-11-21 NOTE — RESULT ENCOUNTER NOTE
Team please reach out to patient and ensure she read this message. I received a note she had not read her message.   Sena Sage PA-C

## 2022-11-21 NOTE — TELEPHONE ENCOUNTER
"Spoke with patient and gave Provider message:     \"Will have patient start simvastatin 20mg once per day. Recheck labs in about 3 months.\"    Patient verbalized understanding, will schedule lab appointment at a later time.     Edilia Zacarias RN  [facility]    "

## 2022-11-21 NOTE — TELEPHONE ENCOUNTER
Will have patient start simvastatin 20mg once per day. Recheck labs in about 3 months.   Sena Sage PA-C

## 2022-11-25 ENCOUNTER — LAB (OUTPATIENT)
Dept: LAB | Facility: CLINIC | Age: 57
End: 2022-11-25
Payer: COMMERCIAL

## 2022-11-25 ENCOUNTER — ANTICOAGULATION THERAPY VISIT (OUTPATIENT)
Dept: ANTICOAGULATION | Facility: CLINIC | Age: 57
End: 2022-11-25

## 2022-11-25 DIAGNOSIS — Z86.718 PERSONAL HISTORY OF DVT (DEEP VEIN THROMBOSIS): Primary | ICD-10-CM

## 2022-11-25 DIAGNOSIS — I82.4Y9 DEEP VEIN THROMBOSIS (DVT) OF PROXIMAL LOWER EXTREMITY, UNSPECIFIED CHRONICITY, UNSPECIFIED LATERALITY (H): ICD-10-CM

## 2022-11-25 LAB — INR BLD: 3.1 (ref 0.9–1.1)

## 2022-11-25 PROCEDURE — 85610 PROTHROMBIN TIME: CPT

## 2022-11-25 PROCEDURE — 36415 COLL VENOUS BLD VENIPUNCTURE: CPT

## 2022-11-25 NOTE — PROGRESS NOTES
ANTICOAGULATION MANAGEMENT     Renée Mcfadden 57 year old female is on warfarin with supratherapeutic INR result. (Goal INR 2.0-3.0)    Recent labs: (last 7 days)     11/25/22  0859   INR 3.1*       ASSESSMENT       Source(s): Chart Review and Patient/Caregiver Call       Warfarin doses taken: Warfarin taken as instructed    Diet: No new diet changes identified    New illness, injury, or hospitalization: No    Medication/supplement changes: None noted    Signs or symptoms of bleeding or clotting: No    Previous INR: Supratherapeutic    Additional findings: None       PLAN     Recommended plan for no diet, medication or health factor changes affecting INR     Dosing Instructions: decrease your warfarin dose (5.6% change) with next INR in 2 weeks       Summary  As of 11/25/2022    Full warfarin instructions:  7.5 mg every Sun, Tue, Thu; 5 mg all other days; Starting 11/25/2022   Next INR check:  12/9/2022             Telephone call with Geraldine who verbalizes understanding and agrees to plan    Lab visit scheduled    Education provided:     Goal range and lab monitoring: goal range and significance of current result    Plan made per [facility] anticoagulation protocol    Farrah Lopez RN  Anticoagulation Clinic  11/25/2022    _______________________________________________________________________     Anticoagulation Episode Summary     Current INR goal:  2.0-3.0   TTR:  65.4 % (8.5 mo)   Target end date:  Indefinite   Send INR reminders to:  DOUGIE RAVI    Indications    Personal history of DVT (deep vein thrombosis) [Z86.718]           Comments:           Anticoagulation Care Providers     Provider Role Specialty Phone number    Davidson Alegria MD Referring Family Medicine [phone]

## 2022-11-25 NOTE — PROGRESS NOTES
ANTICOAGULATION MANAGEMENT     Renée Mcfadden 57 year old female is on warfarin with supratherapeutic INR result. (Goal INR 2.0-3.0)    Recent labs: (last 7 days)     11/25/22  0859   INR 3.1*       ASSESSMENT       Source(s): Chart Review    Previous INR was Supratherapeutic    Medication, diet, health changes since last INR chart reviewed; none identified           PLAN     Unable to reach [facility] today.    No instructions provided. Unable to leave voicemail.    Follow up required to confirm warfarin dose taken and assess for changes and discuss out of range result     Farrah Lopez RN  Anticoagulation Clinic  11/25/2022

## 2022-11-28 ENCOUNTER — MYC REFILL (OUTPATIENT)
Dept: FAMILY MEDICINE | Facility: CLINIC | Age: 57
End: 2022-11-28

## 2022-11-28 DIAGNOSIS — Z86.718 PERSONAL HISTORY OF DVT (DEEP VEIN THROMBOSIS): ICD-10-CM

## 2022-11-29 DIAGNOSIS — Z86.718 PERSONAL HISTORY OF DVT (DEEP VEIN THROMBOSIS): ICD-10-CM

## 2022-11-29 RX ORDER — WARFARIN SODIUM 5 MG/1
TABLET ORAL
Qty: 102 TABLET | Refills: 0 | Status: SHIPPED | OUTPATIENT
Start: 2022-11-29 | End: 2023-02-01

## 2022-11-29 RX ORDER — WARFARIN SODIUM 5 MG/1
TABLET ORAL
Qty: 90 TABLET | Refills: 0 | OUTPATIENT
Start: 2022-11-29

## 2022-12-09 ENCOUNTER — LAB (OUTPATIENT)
Dept: LAB | Facility: CLINIC | Age: 57
End: 2022-12-09
Payer: COMMERCIAL

## 2022-12-09 ENCOUNTER — ANTICOAGULATION THERAPY VISIT (OUTPATIENT)
Dept: ANTICOAGULATION | Facility: CLINIC | Age: 57
End: 2022-12-09

## 2022-12-09 ENCOUNTER — ANCILLARY PROCEDURE (OUTPATIENT)
Dept: MAMMOGRAPHY | Facility: CLINIC | Age: 57
End: 2022-12-09
Attending: PHYSICIAN ASSISTANT
Payer: COMMERCIAL

## 2022-12-09 DIAGNOSIS — Z12.31 VISIT FOR SCREENING MAMMOGRAM: ICD-10-CM

## 2022-12-09 DIAGNOSIS — Z86.718 PERSONAL HISTORY OF DVT (DEEP VEIN THROMBOSIS): Primary | ICD-10-CM

## 2022-12-09 DIAGNOSIS — I82.4Y9 DEEP VEIN THROMBOSIS (DVT) OF PROXIMAL LOWER EXTREMITY, UNSPECIFIED CHRONICITY, UNSPECIFIED LATERALITY (H): ICD-10-CM

## 2022-12-09 LAB — INR BLD: 2.8 (ref 0.9–1.1)

## 2022-12-09 PROCEDURE — 77067 SCR MAMMO BI INCL CAD: CPT | Mod: TC | Performed by: RADIOLOGY

## 2022-12-09 PROCEDURE — 36415 COLL VENOUS BLD VENIPUNCTURE: CPT

## 2022-12-09 PROCEDURE — 85610 PROTHROMBIN TIME: CPT

## 2022-12-09 NOTE — PROGRESS NOTES
ANTICOAGULATION MANAGEMENT     Renée Mcfadden 57 year old female is on warfarin with therapeutic INR result. (Goal INR 2.0-3.0)    Recent labs: (last 7 days)     12/09/22  1035   INR 2.8*       ASSESSMENT       Source(s): Chart Review and Patient/Caregiver Call       Warfarin doses taken: Warfarin taken as instructed    Diet: No new diet changes identified    New illness, injury, or hospitalization: No    Medication/supplement changes: None noted    Signs or symptoms of bleeding or clotting: No    Previous INR: Supratherapeutic    Additional findings: None       PLAN     Recommended plan for no diet, medication or health factor changes affecting INR     Dosing Instructions: Continue your current warfarin dose with next INR in 3 weeks       Summary  As of 12/9/2022    Full warfarin instructions:  7.5 mg every Sun, Tue, Thu; 5 mg all other days; Starting 12/9/2022   Next INR check:  12/30/2022             Telephone call with Geraldine who verbalizes understanding and agrees to plan    Lab visit scheduled    Education provided:     Goal range and lab monitoring: goal range and significance of current result    Plan made per [facility] anticoagulation protocol    Farrah Lopez RN  Anticoagulation Clinic  12/9/2022    _______________________________________________________________________     Anticoagulation Episode Summary     Current INR goal:  2.0-3.0   TTR:  68.3 % (8.6 mo)   Target end date:  Indefinite   Send INR reminders to:  DOUGIE RAVI    Indications    Personal history of DVT (deep vein thrombosis) [Z86.718]           Comments:           Anticoagulation Care Providers     Provider Role Specialty Phone number    Davidson Alegria MD Referring Family Medicine [phone]

## 2022-12-12 ENCOUNTER — DOCUMENTATION ONLY (OUTPATIENT)
Dept: ANTICOAGULATION | Facility: CLINIC | Age: 57
End: 2022-12-12

## 2022-12-12 DIAGNOSIS — Z86.718 PERSONAL HISTORY OF DVT (DEEP VEIN THROMBOSIS): Primary | ICD-10-CM

## 2022-12-12 NOTE — PROGRESS NOTES
ANTICOAGULATION CLINIC REFERRAL RENEWAL REQUEST       An annual renewal order is required for all patients referred to [facility].?  Please review and sign the pended referral order for Renée Mcfadden.       ANTICOAGULATION SUMMARY      Warfarin indication(s)   DVT    Mechanical heart valve present?  NO       Current goal range   INR: 2.0-3.0     Goal appropriate for indication? Goal INR 2-3, standard for indication(s) above     Time in Therapeutic Range (TTR)  (Goal > 60%) 68.3%       Office visit with referring provider's group within last year yes on 10/28/2022         Alyssa Montana RN  [facility]

## 2023-01-02 ENCOUNTER — MYC MEDICAL ADVICE (OUTPATIENT)
Dept: ANTICOAGULATION | Facility: CLINIC | Age: 58
End: 2023-01-02

## 2023-01-02 NOTE — TELEPHONE ENCOUNTER
ANTICOAGULATION     Renée Mcfadden is overdue for INR check.      Mychart sent    Patricia Chavis RN

## 2023-01-09 ENCOUNTER — TELEPHONE (OUTPATIENT)
Dept: ANTICOAGULATION | Facility: CLINIC | Age: 58
End: 2023-01-09

## 2023-01-09 NOTE — TELEPHONE ENCOUNTER
ANTICOAGULATION     Renée Mcfadden is overdue for INR check.      Was unable to reach patient and was unable to leave a voicemail. If patient calls, please schedule INR check as soon as possible.     Adri Odom RN

## 2023-01-13 ENCOUNTER — ANCILLARY PROCEDURE (OUTPATIENT)
Dept: MAMMOGRAPHY | Facility: CLINIC | Age: 58
End: 2023-01-13
Attending: PHYSICIAN ASSISTANT
Payer: COMMERCIAL

## 2023-01-13 DIAGNOSIS — R92.8 ABNORMAL MAMMOGRAM: ICD-10-CM

## 2023-01-13 PROCEDURE — 77065 DX MAMMO INCL CAD UNI: CPT | Mod: LT

## 2023-01-13 PROCEDURE — 76642 ULTRASOUND BREAST LIMITED: CPT | Mod: LT

## 2023-01-13 PROCEDURE — 77061 BREAST TOMOSYNTHESIS UNI: CPT | Mod: LT

## 2023-01-13 NOTE — PROGRESS NOTES
Per [facility] Radiologist, Dr. Alvarez, I have assisted the patient with scheduling the following:    Breast Surgeon Consult:  Dr. Tsang 1/26/23   [address], [address]   [place] [address]  [phone]    Patient denies further questions, verbalizes understanding and agrees with this plan.      Ramandeep Nix, RN, BSN, PHN, CBCN  Breast Center Imaging Nurse Coordinator   [address]  [place] [address]  [phone]

## 2023-01-17 ENCOUNTER — TELEPHONE (OUTPATIENT)
Dept: ANTICOAGULATION | Facility: CLINIC | Age: 58
End: 2023-01-17
Payer: COMMERCIAL

## 2023-01-17 NOTE — TELEPHONE ENCOUNTER
ANTICOAGULATION     Renée Mcfadden is overdue for INR check.      Left message for patient to call and schedule lab appointment as soon as possible. If returning call, please schedule.     Jolynn Saba RN

## 2023-01-17 NOTE — LETTER
[facility] ANTICOAGULATION CLINIC  [address] [facility] [address]  Phone: [phone]  Fax: [phone]   January 17, 2023        Renée Mcfadden  [address] [facility] [address]            Dear Renée,    You are currently under the care of [facility] for your warfarin (Coumadin , Jantoven ) therapy.  We are contacting you because our records show you were due for an INR on 12/30/2022.    There are potentially serious risks when taking warfarin without careful monitoring and we want to make sure you are safely managed.  Routine lab monitoring is required for warfarin refills.     Please call [phone]  as soon as possible to schedule an appointment.  If there has been a change in your care or other concerns, please let us know so we can help and or update our records.     Sincerely,       [facility]

## 2023-01-18 ENCOUNTER — ANCILLARY PROCEDURE (OUTPATIENT)
Dept: MAMMOGRAPHY | Facility: CLINIC | Age: 58
End: 2023-01-18
Attending: PHYSICIAN ASSISTANT
Payer: COMMERCIAL

## 2023-01-18 DIAGNOSIS — R92.8 ABNORMAL MAMMOGRAM: ICD-10-CM

## 2023-01-18 PROCEDURE — 272N000431 US BREAST BIOPSY CORE NEEDLE LEFT

## 2023-01-18 PROCEDURE — 999N000065 MA POST PROCEDURE LEFT

## 2023-01-18 PROCEDURE — 88342 IMHCHEM/IMCYTCHM 1ST ANTB: CPT | Mod: TC | Performed by: PHYSICIAN ASSISTANT

## 2023-01-18 PROCEDURE — 88305 TISSUE EXAM BY PATHOLOGIST: CPT | Mod: 26 | Performed by: PATHOLOGY

## 2023-01-18 PROCEDURE — 88341 IMHCHEM/IMCYTCHM EA ADD ANTB: CPT | Mod: 26 | Performed by: PATHOLOGY

## 2023-01-18 PROCEDURE — A4648 IMPLANTABLE TISSUE MARKER: HCPCS

## 2023-01-18 PROCEDURE — 88342 IMHCHEM/IMCYTCHM 1ST ANTB: CPT | Mod: 26 | Performed by: PATHOLOGY

## 2023-01-18 PROCEDURE — 250N000009 HC RX 250: Performed by: PHYSICIAN ASSISTANT

## 2023-01-18 PROCEDURE — 88360 TUMOR IMMUNOHISTOCHEM/MANUAL: CPT | Mod: 26 | Performed by: PATHOLOGY

## 2023-01-18 RX ADMIN — LIDOCAINE HYDROCHLORIDE 10 ML: 10 INJECTION, SOLUTION INFILTRATION; PERINEURAL at 14:57

## 2023-01-20 ENCOUNTER — ANTICOAGULATION THERAPY VISIT (OUTPATIENT)
Dept: ANTICOAGULATION | Facility: CLINIC | Age: 58
End: 2023-01-20

## 2023-01-20 ENCOUNTER — TELEPHONE (OUTPATIENT)
Dept: MAMMOGRAPHY | Facility: CLINIC | Age: 58
End: 2023-01-20

## 2023-01-20 ENCOUNTER — LAB (OUTPATIENT)
Dept: LAB | Facility: CLINIC | Age: 58
End: 2023-01-20
Payer: COMMERCIAL

## 2023-01-20 DIAGNOSIS — Z11.4 SCREENING FOR HIV (HUMAN IMMUNODEFICIENCY VIRUS): ICD-10-CM

## 2023-01-20 DIAGNOSIS — Z86.718 PERSONAL HISTORY OF DVT (DEEP VEIN THROMBOSIS): Primary | ICD-10-CM

## 2023-01-20 DIAGNOSIS — Z86.718 PERSONAL HISTORY OF DVT (DEEP VEIN THROMBOSIS): ICD-10-CM

## 2023-01-20 LAB — INR BLD: 2.1 (ref 0.9–1.1)

## 2023-01-20 PROCEDURE — 85610 PROTHROMBIN TIME: CPT

## 2023-01-20 PROCEDURE — 36416 COLLJ CAPILLARY BLOOD SPEC: CPT

## 2023-01-20 NOTE — PROGRESS NOTES
ANTICOAGULATION MANAGEMENT     Renée Mcfadden 57 year old female is on warfarin with therapeutic INR result. (Goal INR 2.0-3.0)    Recent labs: (last 7 days)     01/20/23  0936   INR 2.1*       ASSESSMENT       Source(s): Chart Review and Patient/Caregiver Call       Warfarin doses taken: Warfarin taken as instructed    Diet: No new diet changes identified    New illness, injury, or hospitalization: Breast Biopsy done on Tues, results pending     Medication/supplement changes: None noted    Signs or symptoms of bleeding or clotting: No    Previous INR: Therapeutic last visit; previously outside of goal range    Additional findings: None       PLAN     Recommended plan for no diet, medication or health factor changes affecting INR     Dosing Instructions: Continue your current warfarin dose with next INR in 4 weeks       Summary  As of 1/20/2023    Full warfarin instructions:  7.5 mg every Sun, Tue, Thu; 5 mg all other days   Next INR check:  2/17/2023             Telephone call with Geraldine who verbalizes understanding and agrees to plan    Lab visit scheduled    Education provided:     Contact [phone]  with any changes, questions or concerns.     Plan made per [facility] anticoagulation protocol    Rajwinder Bunch RN  Anticoagulation Clinic  1/20/2023    _______________________________________________________________________     Anticoagulation Episode Summary     Current INR goal:  2.0-3.0   TTR:  70.2 % (8.6 mo)   Target end date:  Indefinite   Send INR reminders to:  DOUGIE RAVI    Indications    Personal history of DVT (deep vein thrombosis) [Z86.718]           Comments:           Anticoagulation Care Providers     Provider Role Specialty Phone number    Davidson Alegria MD Referring Family Medicine [phone]

## 2023-01-20 NOTE — TELEPHONE ENCOUNTER
Pathology report was reviewed with our Breast Center Radiologist, Dr. Harkins, who confirmed the recent breast imaging is concordant with the final surgical pathology results.    I phoned patient, confirmed her full name, date of birth, and notified patient of the following breast biopsy results:      Final Diagnosis     BREAST BIOPSY WITH NEOPLASTIC LESION:          -  BIOPSY TYPE: ULTRASOUND-GUIDED CORE BIOPSIES        -  BIOPSY SITE: LEFT BREAST, 2:00, 8 CM FROM NIPPLE        -  HISTOLOGIC TYPE: INVASIVE DUCTAL CARCINOMA        -  HISTOLOGIC GRADE (ADRY HISTOLOGIC SCORE):              -  TUBULAR DIFFERENTIATION SCORE 1              -  NUCLEAR PLEOMORPHISM SCORE 2              -  MITOTIC RATE SCORE 1              -  OVERALL GRADE 1 (TOTAL SCORE 4/9)        -  DUCTAL CARCINOMA IN SITU (DCIS): PRESENT, ISOLATED DUCTS (EIC NOT PRESENT)              -  FLAT PATTERN              -  LOW NUCLEAR GRADE, NO NECROSIS        -  MICROCALCIFICATIONS: PRESENT BUT RARE, ASSOCIATED WITH BENIGN STROMA        -  SMALL-VESSEL LYMPHATIC INVASION: NOT IDENTIFIED        -  ADDITIONAL FINDINGS: NONE        -  ADDITIONAL STUDIES: SEE COMMENT BELOW                 Electronically signed by Ranjeet Yu MD on 1/20/2023 at  2:25 PM   Comment     Green marking ink is confirmed.     All tissue controls stain appropriately.     The case is also reviewed by Dr. Jenna Head, who concurs.     Immunohistochemistry for ER, NH, and HER2/justin will be performed, and the results will be reported in a separate addendum.       Per Breast Center Radiologist, I have assisted scheduling patient for the following:    Breast Surgeon Consult:  Dr. Tsang 1/26/23 @ 10:00    [address], [address] [place] [address]  [phone]    Oncology Consult:  Dr. Guevara  1/26/23 & 10:30   [address], [address] [place] [address]  [phone]      Patient denies any  concerns at her biopsy site, just slightly tender today.  Questions were answered and support provided. New diagnosis information and resource folder will be provided to patient at surgical consult.  She has my phone number if she has further questions.  Patient verbalized understanding and agrees with the plan of care.    Ordering provider has been notified of the results and plan.       Ramandeep Nix RN, BSN, PHN, CBCN  Imaging Nurse Coordinator  [facility]  [phone]

## 2023-01-24 LAB
PATH REPORT.ADDENDUM SPEC: ABNORMAL
PATH REPORT.COMMENTS IMP SPEC: ABNORMAL
PATH REPORT.COMMENTS IMP SPEC: YES
PATH REPORT.FINAL DX SPEC: ABNORMAL
PATH REPORT.GROSS SPEC: ABNORMAL
PATH REPORT.MICROSCOPIC SPEC OTHER STN: ABNORMAL
PATH REPORT.RELEVANT HX SPEC: ABNORMAL
PHOTO IMAGE: ABNORMAL

## 2023-01-26 ENCOUNTER — OFFICE VISIT (OUTPATIENT)
Dept: ONCOLOGY | Facility: HOSPITAL | Age: 58
End: 2023-01-26
Attending: FAMILY MEDICINE
Payer: COMMERCIAL

## 2023-01-26 ENCOUNTER — OFFICE VISIT (OUTPATIENT)
Dept: SURGERY | Facility: CLINIC | Age: 58
End: 2023-01-26
Attending: FAMILY MEDICINE
Payer: COMMERCIAL

## 2023-01-26 VITALS
HEIGHT: 64 IN | SYSTOLIC BLOOD PRESSURE: 145 MMHG | HEART RATE: 90 BPM | RESPIRATION RATE: 16 BRPM | DIASTOLIC BLOOD PRESSURE: 89 MMHG | WEIGHT: 179 LBS | BODY MASS INDEX: 30.56 KG/M2

## 2023-01-26 DIAGNOSIS — I73.00 RAYNAUD'S DISEASE WITHOUT GANGRENE: ICD-10-CM

## 2023-01-26 DIAGNOSIS — C50.412 MALIGNANT NEOPLASM OF UPPER-OUTER QUADRANT OF LEFT BREAST IN FEMALE, ESTROGEN RECEPTOR POSITIVE (H): Primary | ICD-10-CM

## 2023-01-26 DIAGNOSIS — Z17.0 MALIGNANT NEOPLASM OF UPPER-OUTER QUADRANT OF LEFT BREAST IN FEMALE, ESTROGEN RECEPTOR POSITIVE (H): Primary | ICD-10-CM

## 2023-01-26 DIAGNOSIS — R76.0 ANTICARDIOLIPIN ANTIBODY POSITIVE: ICD-10-CM

## 2023-01-26 PROCEDURE — 99205 OFFICE O/P NEW HI 60 MIN: CPT | Performed by: INTERNAL MEDICINE

## 2023-01-26 PROCEDURE — 99212 OFFICE O/P EST SF 10 MIN: CPT | Performed by: SPECIALIST

## 2023-01-26 PROCEDURE — 99205 OFFICE O/P NEW HI 60 MIN: CPT | Performed by: SPECIALIST

## 2023-01-26 NOTE — PROGRESS NOTES
[facility] Hematology and Oncology Consult Note    Patient: Renée Mcfadden  MRN: [id number]  Date of Service: 01/26/2023      Reason for Visit    No chief complaint on file.        Assessment/Plan    Problem List Items Addressed This Visit        Circulatory    Raynaud's disease without gangrene       Other    Anticardiolipin antibody positive    Malignant neoplasm of upper-outer quadrant of left breast in female, estrogen receptor positive (H) - Primary     Early left-sided breast cancer  I reviewed her mammogram, ultrasound and needle biopsy reports in detail.  Also reviewed her previous history of scleroderma and Raynaud's disease.  By ultrasound it looks like she has a small tumor.  However due to her rheumatological history treatment would be a bit tricky especially from the surgical and radiation standpoint.  Since the tumor is strongly ER positive she will need endocrine therapy.  With that she needs chemotherapy or not will depend upon the Oncotype DX testing and surgical path.  Reviewed different endocrine therapy options in detail.  Considering her history of recurrent VTE is, tamoxifen would not be my first choice.  I think she would be a candidate for aromatase inhibitors.  I will see her in the oncology clinic after her surgery.    Strongly encouraged smoking cessation.    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     Deep vein thrombosis (DVT) of proximal lower extremity, unspecified chronicity, unspecified laterality (H)     Personal history of DVT (deep vein thrombosis)     Anticardiolipin antibody positive     Mild aortic stenosis     Mild mitral stenosis     Malignant neoplasm of upper-outer quadrant of left breast in female, estrogen receptor positive (H)       ______________________________________________________________________________    Staging History     Cancer Staging   No matching staging information was found for the patient.      History of presenting illness:  Renée Mcfadden is a 57-year-old female with history of limited scleroderma not on any immunosuppression, history of VTE on anticoagulation now with a new diagnosis of left-sided breast cancer is seen in the multidisciplinary clinic for further evaluation of the same.    Recently underwent a screening mammogram which showed a focal asymmetry in the left upper breast at posterior depth.  Ultrasound showed a 6 x 4 x 5 mm hypoechoic mass with microlobulated margins at 2 o'clock position the left breast about 8 cm from the nipple.  Needle biopsy showed invasive ductal carcinoma, grade 1, with background of DCIS, strongly ER positive, KS negative and HER2 negative by IHC.  No evidence of abnormal lymph nodes in the left axilla.    She is a current everyday smoker.  As mentioned earlier she does have history of limited scleroderma and Raynaud's disease.  Has history of tissue ischemia in her fingers.  Not on immunosuppression.  She follows with rheumatology who have strongly recommended starting hydroxychloroquine.  Also has history of recurrent DVTs and is currently on long-term anticoagulation.  No history of lupus but she does have elevated anticardiolipin antibodies.  Not a true positive range.      Past History    Past Medical History:   Diagnosis Date     DVT (deep venous thrombosis) (H)     Left leg     Hypertension 2014    last 3-4 years     Papanicolaou smear of cervix with low grade squamous intraepithelial lesion (LGSIL) 11/10/2017     Seizure (H) Age 9    Dilantin prescribed by Dr. Dodge. D/c age 11 .     Smoker 2013    Family History   Problem Relation Age of Onset     Cancer Father          age 70+,  details unknown     Hypertension Mother      Neurologic Disorder Mother  40        doing fine now     Hyperlipidemia Mother      Anesthesia Reaction Mother      Asthma Mother      Cerebrovascular Disease Maternal Grandmother 85     Osteoporosis Other       Past Surgical History:   Procedure Laterality Date     ABDOMEN SURGERY      late 80's-early 90's: Endometriosis     BIOPSY      abnormal paps     GYN SURGERY  1989    D and C     LUMPECTOMY BREAST Left 2/15/2023    Procedure: Left Lumpectomy after Wire Localization; [place] Lymph Node Biopsy;  Surgeon: Belgica Tsang MD;  Location: [place]     SCLEROTHERAPY Bilateral 2003    Both legs-Vericose veins    Social History     Socioeconomic History     Marital status:      Spouse name: Not on file     Number of children: 0     Years of education: 13     Highest education level: Not on file   Occupational History     Occupation:      Comment: [employer]   Tobacco Use     Smoking status: Every Day     Packs/day: 0.50     Years: 35.00     Pack years: 17.50     Types: Cigarettes     Start date: 1/1/1979     Smokeless tobacco: Never   Vaping Use     Vaping Use: Never used   Substance and Sexual Activity     Alcohol use: Yes     Alcohol/week: 0.0 standard drinks     Comment: 3-4 beers weekly     Drug use: No     Sexual activity: Not Currently     Partners: Male     Birth control/protection: Post-menopausal   Other Topics Concern     Parent/sibling w/ CABG, MI or angioplasty before 65F 55M? No   Social History Narrative    Works as a  of commodities (electronic components) for work.  Large travel burden.  Long days when on the road.       Social Determinants of Health     Financial Resource Strain: Not on file   Food Insecurity: Not on file   Transportation Needs: Not on file   Physical Activity: Not on file   Stress: Not on file   Social Connections: Not on file   Intimate Partner Violence: Not on file   Housing Stability: Not on file        Allergies    No Known Allergies    Review of Systems    Pertinent items are  noted in HPI.      Physical Exam    Oncology Vitals 2/15/2023   Height -   Weight -   BSA (m2) -   /65   Pulse 74   Temp -   Temp src -   SpO2 94   Pain Score -   Some recent data might be hidden       General: alert and cooperative  HEENT: Head: Normal, normocephalic, atraumatic.  Eye: Normal external eye, conjunctiva, lids cornea, CASSIUS.  Chest: Clear to auscultation bilaterally  Cardiac: S1, S2 normal, regular rate and rhythm  Abdomen: abdomen is soft without significant tenderness, masses, organomegaly or guarding  Extremities: atraumatic, no peripheral edema  Skin:   CNS: Alert and oriented x3, neurologic exam grossly normal.  Lymphatics: No bilateral cervical, axillary, supraclavicular or inguinal adenopathy noted      Lab Results    No results found for this or any previous visit (from the past 168 hour(s)).    Imaging Results    MA Post Procedure Left    Result Date: 2/15/2023  INDICATION: Pre-operative localization of invasive ductal carcinoma at 2 o'clock, 8 cm from the nipple. PROCEDURE: Informed consent was obtained from the patient. The breast was cleansed with ChloraPrep. Lidocaine was used for local anesthesia. A -gauge needle was then advanced to the area of abnormality. A localization wire was then deployed. 492uCi 99mTc Lymphoseek was injected subdermally along the upper outer aspect of the areolar margin. Post-procedure mammograms demonstrate the localization wire in appropriate position. The previously placed marker was visualized. The patient tolerated this well.    IMPRESSION: Sonographically guided  wire localization. A specimen was sent for radiography. Specimen radiograph demonstrates the area of abnormality and the localization wire to be included in the specimen.     MA Breast Specimen Left    Result Date: 2/15/2023  INDICATION: Pre-operative localization of invasive ductal carcinoma at 2 o'clock, 8 cm from the nipple. PROCEDURE: Informed consent was obtained from the patient. The  breast was cleansed with ChloraPrep. Lidocaine was used for local anesthesia. A -gauge needle was then advanced to the area of abnormality. A localization wire was then deployed. 492uCi 99mTc Lymphoseek was injected subdermally along the upper outer aspect of the areolar margin. Post-procedure mammograms demonstrate the localization wire in appropriate position. The previously placed marker was visualized. The patient tolerated this well.    IMPRESSION: Sonographically guided  wire localization. A specimen was sent for radiography. Specimen radiograph demonstrates the area of abnormality and the localization wire to be included in the specimen.     Image Guided Breast Localization w Sent Node Inj Left    Result Date: 2/15/2023  INDICATION: Pre-operative localization of invasive ductal carcinoma at 2 o'clock, 8 cm from the nipple. PROCEDURE: Informed consent was obtained from the patient. The breast was cleansed with ChloraPrep. Lidocaine was used for local anesthesia. A -gauge needle was then advanced to the area of abnormality. A localization wire was then deployed. 492uCi 99mTc Lymphoseek was injected subdermally along the upper outer aspect of the areolar margin. Post-procedure mammograms demonstrate the localization wire in appropriate position. The previously placed marker was visualized. The patient tolerated this well.    IMPRESSION: Sonographically guided  wire localization. A specimen was sent for radiography. Specimen radiograph demonstrates the area of abnormality and the localization wire to be included in the specimen.       A total of 60 minutes was spent today on this visit including face to face conversation with the patient, EMR review (labs, imaging studies, pathology reports and outside records), counseling and care co-ordination and documentation.    Signed by: Jorge Guevara MD

## 2023-01-26 NOTE — NURSING NOTE
"Geraldine presents to [facility] today for a surgical consult with Dr. Tsang and a pre op oncology consult with Dr. Guevara regarding her newly diagnosed left breast cancer.  She is accompanied by herself for consult.  RN assessment and EMR update. BP (!) 145/89 (BP Location: Left arm)   Pulse 90   Resp 16   Ht 1.626 m (5' 4\")   Wt 81.2 kg (179 lb)   BMI 30.73 kg/m    Patient given a Breast Cancer Packet, contents reviewed.  She met with Dr. Tsang and Dr. Guevara see dictation for details of visit. She will plan to talk to Dr. Tsang tomorrow after her case is discussed at tumor board.    Support provided, invited calls.  RN time 20 mins.  "

## 2023-01-26 NOTE — LETTER
1/26/2023         RE: Renée Mcfadden  [address] [facility] [address]        Dear Colleague,    Thank you for referring your patient, Renée Mcfadden, to the [facility] [place]. Please see a copy of my visit note below.    This is a 57 year old  female who I'm asked to see by Davidson Alegria for evaluation of a left breast cancer.  This was picked up  on screening mammogram.   The patient cannot feel a mass.  A needle biopsy was done which shows an invasive ductal carcinoma.  It is estrogen receptor strongly positive , progesterone receptor negative  and HER-2 negative.   The patient has a history of Raynaud's and is thought to have scleroderma although its not 100% clear.  She likely has a clotting disorder as she has had 2 DVTs.  She unfortunately has not been able to stop smoking.     She has no family history of breast cancer.      PAST MEDICAL HISTORY:  Past Medical History:   Diagnosis Date     DVT (deep venous thrombosis) (H) 2003    Left leg     Hypertension 07/01/2014    last 3-4 years     Papanicolaou smear of cervix with low grade squamous intraepithelial lesion (LGSIL) 11/10/2017     Seizure (H) Age 9    Dilantin prescribed by Dr. Dodge. D/c age 11 .     Smoker 8/27/2013     Past Surgical History:   Procedure Laterality Date     ABDOMEN SURGERY      late 80's-early 90's: Endometriosis     BIOPSY      abnormal paps     GYN SURGERY  1989    D and C     SCLEROTHERAPY Bilateral 2003    Both legs-Vericose veins       Medications:    Current Outpatient Medications:      NIFEdipine ER OSMOTIC (PROCARDIA XL) 60 MG 24 hr tablet, Take 1 tablet (60 mg) by mouth daily, Disp: 90 tablet, Rfl: 3     simvastatin (ZOCOR) 20 MG tablet, Take 1 tablet (20 mg) by mouth At Bedtime, Disp: 90 tablet, Rfl: 1     warfarin ANTICOAGULANT (JANTOVEN ANTICOAGULANT) 5 MG tablet, TAKE 1.5 TABLETS (7.5MG) ON SUNDAY, TUESDAY, AND THURSDAY AND 1 TABLET (5 MG) ALL OTHER DAYS OF THE WEEK,  "Disp: 102 tablet, Rfl: 0    Allergies:  No Known Allergies     Social History:  Social History     Tobacco Use     Smoking status: Some Days     Packs/day: 0.50     Years: 35.00     Pack years: 17.50     Types: Cigarettes     Start date: 1/1/1979     Smokeless tobacco: Never   Substance Use Topics     Alcohol use: Yes     Alcohol/week: 0.0 standard drinks     Comment: 3-4 beers weekly     Drug use: No        ROS:  A comprehensive review of systems was negative.    Physical Exam  BP (!) 145/89 (BP Location: Left arm)   Pulse 90   Resp 16   Ht 1.626 m (5' 4\")   Wt 81.2 kg (179 lb)   BMI 30.73 kg/m    General:alert, appears stated age and cooperative  Lungs:clear to auscultation bilaterally  CV:regular rate and rhythm  Breasts:breasts symmetric, no dominant or suspicious mass or no skin or nipple changes.  She has significant telangiectasia changes in the skin in her chest wall.  Lymph Nodes:Supple without adenopathy  Neuro:Grossly normal  Musculoskeletal: Normal range of motion of her upper extremities.  Skin: She has a nonhealing ulcer on the tip of her right index finger that is dressed.  She has changes in her fingers consistent with scleroderma where the skin is somewhat tight.  Her hands themselves are very warm.    Reviewed her mammograms and ultrasound and pathology.     Impression: Left Breast Cancer. Clinically T1, N0.  Discussed the surgical options for treatment of breast cancer which generally are a lumpectomy (partial mastectomy) combined with radiation versus a mastectomy.  Explained that the survival benefit is the same for both.  The difference is in local recurrence risk.  The patient is a Excellent candidate for a lumpectomy from a purely anatomic situation.  However, I do not think the patient is a candidate for radiation given her history of Raynaud's and what is likely scleroderma.  She clearly has some sort of autoimmune disorder.  On the other hand, I also do not think she is a great " candidate for mastectomy.  I think she is at exceedingly high risk for flap necrosis if she did do a mastectomy.  In addition, she really does not want a mastectomy.  Discussed SLN biopsy.  The procedure and rationale were explained.  Discussed that at this point we do not know yet whether or not she will need chemotherapy and we may not know until we get all of the results of surgery back.  Often times, a test called an Oncotype is needed to determine whether or not chemotherapy is needed.  This can take several weeks to get the results back after surgery.  The patient will need to be off of her Coumadin for at least 4 days prior to surgery no matter what surgery we end up doing, but even then is still at higher risk for hematoma formation after surgery.  Will need to be bridged with Lovenox.  Encouraged her to stop smoking.    I would like to discuss her at tumor conference.  We will do that tomorrow just to get input from radiation oncology and everybody else as to the best option for this patient.  I am leaning toward a lumpectomy with really wide margins and no radiation.  I explained to her that is not typically what we would recommend for a 57-year-old but I do not see a lot of great options for this unfortunate woman.      Plan: I am going to present her at tumor conference and then I will call her tomorrow to talk again about the options for surgery but at this point we are leaning toward lumpectomy and sentinel lymph node biopsy.  She will need a wire Loke.  Did talk about the typical recovery time.  Also had a talked her about the fact she will need somebody to drive her.  She was concerned she would not build to find somebody to drive her and I explained the importance of this.  Total time with the patient, reviewing her chart and medical record and then documentation was approximately 60 minutes.                Again, thank you for allowing me to participate in the care of your patient.         Sincerely,        Belgica Tsang MD

## 2023-01-26 NOTE — PROGRESS NOTES
This is a 57 year old  female who I'm asked to see by Davidson Alegria for evaluation of a left breast cancer.  This was picked up  on screening mammogram.   The patient cannot feel a mass.  A needle biopsy was done which shows an invasive ductal carcinoma.  It is estrogen receptor strongly positive , progesterone receptor negative  and HER-2 negative.   The patient has a history of Raynaud's and is thought to have scleroderma although its not 100% clear.  She likely has a clotting disorder as she has had 2 DVTs.  She unfortunately has not been able to stop smoking.     She has no family history of breast cancer.      PAST MEDICAL HISTORY:  Past Medical History:   Diagnosis Date     DVT (deep venous thrombosis) (H) 2003    Left leg     Hypertension 07/01/2014    last 3-4 years     Papanicolaou smear of cervix with low grade squamous intraepithelial lesion (LGSIL) 11/10/2017     Seizure (H) Age 9    Dilantin prescribed by Dr. Dodge. D/c age 11 .     Smoker 8/27/2013     Past Surgical History:   Procedure Laterality Date     ABDOMEN SURGERY      late 80's-early 90's: Endometriosis     BIOPSY      abnormal paps     GYN SURGERY  1989    D and C     SCLEROTHERAPY Bilateral 2003    Both legs-Vericose veins       Medications:    Current Outpatient Medications:      NIFEdipine ER OSMOTIC (PROCARDIA XL) 60 MG 24 hr tablet, Take 1 tablet (60 mg) by mouth daily, Disp: 90 tablet, Rfl: 3     simvastatin (ZOCOR) 20 MG tablet, Take 1 tablet (20 mg) by mouth At Bedtime, Disp: 90 tablet, Rfl: 1     warfarin ANTICOAGULANT (JANTOVEN ANTICOAGULANT) 5 MG tablet, TAKE 1.5 TABLETS (7.5MG) ON SUNDAY, TUESDAY, AND THURSDAY AND 1 TABLET (5 MG) ALL OTHER DAYS OF THE WEEK, Disp: 102 tablet, Rfl: 0    Allergies:  No Known Allergies     Social History:  Social History     Tobacco Use     Smoking status: Some Days     Packs/day: 0.50     Years: 35.00     Pack years: 17.50     Types: Cigarettes     Start date: 1/1/1979     Smokeless tobacco:  "Never   Substance Use Topics     Alcohol use: Yes     Alcohol/week: 0.0 standard drinks     Comment: 3-4 beers weekly     Drug use: No        ROS:  A comprehensive review of systems was negative.    Physical Exam  BP (!) 145/89 (BP Location: Left arm)   Pulse 90   Resp 16   Ht 1.626 m (5' 4\")   Wt 81.2 kg (179 lb)   BMI 30.73 kg/m    General:alert, appears stated age and cooperative  Lungs:clear to auscultation bilaterally  CV:regular rate and rhythm, grade 2-3 systolic murmur  Breasts:breasts symmetric, no dominant or suspicious mass or no skin or nipple changes.  She has significant telangiectasia changes in the skin in her chest wall.  Lymph Nodes:Supple without adenopathy  Neuro:Grossly normal  Musculoskeletal: Normal range of motion of her upper extremities.  Skin: She has a nonhealing ulcer on the tip of her right index finger that is dressed.  She has changes in her fingers consistent with scleroderma where the skin is somewhat tight.  Her hands themselves are very warm.    Reviewed her mammograms and ultrasound and pathology.     Impression: Left Breast Cancer. Clinically T1, N0.  Discussed the surgical options for treatment of breast cancer which generally are a lumpectomy (partial mastectomy) combined with radiation versus a mastectomy.  Explained that the survival benefit is the same for both.  The difference is in local recurrence risk.  The patient is a Excellent candidate for a lumpectomy from a purely anatomic situation.  However, I do not think the patient is a candidate for radiation given her history of Raynaud's and what is likely scleroderma.  She clearly has some sort of autoimmune disorder.  On the other hand, I also do not think she is a great candidate for mastectomy.  I think she is at exceedingly high risk for flap necrosis if she did do a mastectomy.  In addition, she really does not want a mastectomy.  Discussed SLN biopsy.  The procedure and rationale were explained.  Discussed that " at this point we do not know yet whether or not she will need chemotherapy and we may not know until we get all of the results of surgery back.  Often times, a test called an Oncotype is needed to determine whether or not chemotherapy is needed.  This can take several weeks to get the results back after surgery.  The patient will need to be off of her Coumadin for at least 4 days prior to surgery no matter what surgery we end up doing, but even then is still at higher risk for hematoma formation after surgery.  Will need to be bridged with Lovenox.  Encouraged her to stop smoking.    I would like to discuss her at tumor conference.  We will do that tomorrow just to get input from radiation oncology and everybody else as to the best option for this patient.  I am leaning toward a lumpectomy with really wide margins and no radiation.  I explained to her that is not typically what we would recommend for a 57-year-old but I do not see a lot of great options for this unfortunate woman.      Plan: I am going to present her at tumor conference and then I will call her tomorrow to talk again about the options for surgery but at this point we are leaning toward lumpectomy and sentinel lymph node biopsy.  She will need a wire Loke.  Did talk about the typical recovery time.  Also had a talked her about the fact she will need somebody to drive her.  She was concerned she would not build to find somebody to drive her and I explained the importance of this.  Total time with the patient, reviewing her chart and medical record and then documentation was approximately 60 minutes.    Addendum: Discussed at tumor conference.  Radiation oncology agrees that she would not be a good candidate for radiation but I also think she is at high risk for surgery with a mastectomy.  I think a wide lumpectomy with then close follow-up afterwards is probably going to be her best option for right now.  She will need to be off her coumadin for 4  days prior to surgery and will need to be bridged with Lovenox.

## 2023-01-26 NOTE — LETTER
1/26/2023         RE: Renée Mcfadden  [address] [facility] [address]        Dear Colleague,    Thank you for referring your patient, Renée Mcfadden, to the [facility]. Please see a copy of my visit note below.    [facility] Hematology and Oncology Consult Note    Patient: Renée Mcfadden  MRN: [id number]  Date of Service: 01/26/2023      Reason for Visit    No chief complaint on file.        Assessment/Plan    Problem List Items Addressed This Visit        Circulatory    Raynaud's disease without gangrene       Other    Anticardiolipin antibody positive    Malignant neoplasm of upper-outer quadrant of left breast in female, estrogen receptor positive (H) - Primary     Early left-sided breast cancer  I reviewed her mammogram, ultrasound and needle biopsy reports in detail.  Also reviewed her previous history of scleroderma and Raynaud's disease.  By ultrasound it looks like she has a small tumor.  However due to her rheumatological history treatment would be a bit tricky especially from the surgical and radiation standpoint.  Since the tumor is strongly ER positive she will need endocrine therapy.  With that she needs chemotherapy or not will depend upon the Oncotype DX testing and surgical path.  Reviewed different endocrine therapy options in detail.  Considering her history of recurrent VTE is, tamoxifen would not be my first choice.  I think she would be a candidate for aromatase inhibitors.  I will see her in the oncology clinic after her surgery.    Strongly encouraged smoking cessation.    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     Deep vein thrombosis (DVT) of proximal lower extremity, unspecified chronicity, unspecified laterality (H)     Personal history of DVT  (deep vein thrombosis)     Anticardiolipin antibody positive     Mild aortic stenosis     Mild mitral stenosis     Malignant neoplasm of upper-outer quadrant of left breast in female, estrogen receptor positive (H)      ______________________________________________________________________________    Staging History     Cancer Staging   No matching staging information was found for the patient.      History of presenting illness:  Renée Mcfadden is a 57-year-old female with history of limited scleroderma not on any immunosuppression, history of VTE on anticoagulation now with a new diagnosis of left-sided breast cancer is seen in the multidisciplinary clinic for further evaluation of the same.    Recently underwent a screening mammogram which showed a focal asymmetry in the left upper breast at posterior depth.  Ultrasound showed a 6 x 4 x 5 mm hypoechoic mass with microlobulated margins at 2 o'clock position the left breast about 8 cm from the nipple.  Needle biopsy showed invasive ductal carcinoma, grade 1, with background of DCIS, strongly ER positive, MO negative and HER2 negative by IHC.  No evidence of abnormal lymph nodes in the left axilla.    She is a current everyday smoker.  As mentioned earlier she does have history of limited scleroderma and Raynaud's disease.  Has history of tissue ischemia in her fingers.  Not on immunosuppression.  She follows with rheumatology who have strongly recommended starting hydroxychloroquine.  Also has history of recurrent DVTs and is currently on long-term anticoagulation.  No history of lupus but she does have elevated anticardiolipin antibodies.  Not a true positive range.      Past History    Past Medical History:   Diagnosis Date     DVT (deep venous thrombosis) (H) 2003    Left leg     Hypertension 07/01/2014    last 3-4 years     Papanicolaou smear of cervix with low grade squamous intraepithelial lesion (LGSIL) 11/10/2017     Seizure (H) Age 9    Dilantin  prescribed by Dr. Dodge. D/c age 11 .     Smoker 2013    Family History   Problem Relation Age of Onset     Cancer Father          age 70+,  details unknown     Hypertension Mother      Neurologic Disorder Mother 40        doing fine now     Hyperlipidemia Mother      Anesthesia Reaction Mother      Asthma Mother      Cerebrovascular Disease Maternal Grandmother 85     Osteoporosis Other       Past Surgical History:   Procedure Laterality Date     ABDOMEN SURGERY      late 80's-early 90's: Endometriosis     BIOPSY      abnormal paps     GYN SURGERY      D and C     LUMPECTOMY BREAST Left 2/15/2023    Procedure: Left Lumpectomy after Wire Localization; [place] Lymph Node Biopsy;  Surgeon: Belgica Tsang MD;  Location: [facility]     SCLEROTHERAPY Bilateral     Both legs-Vericose veins    Social History     Socioeconomic History     Marital status:      Spouse name: Not on file     Number of children: 0     Years of education: 13     Highest education level: Not on file   Occupational History     Occupation:      Comment: [employer]   Tobacco Use     Smoking status: Every Day     Packs/day: 0.50     Years: 35.00     Pack years: 17.50     Types: Cigarettes     Start date: 1979     Smokeless tobacco: Never   Vaping Use     Vaping Use: Never used   Substance and Sexual Activity     Alcohol use: Yes     Alcohol/week: 0.0 standard drinks     Comment: 3-4 beers weekly     Drug use: No     Sexual activity: Not Currently     Partners: Male     Birth control/protection: Post-menopausal   Other Topics Concern     Parent/sibling w/ CABG, MI or angioplasty before 65F 55M? No   Social History Narrative    Works as a  of commodities (electronic components) for work.  Large travel burden.  Long days when on the road.       Social Determinants of Health     Financial Resource Strain: Not on file   Food Insecurity: Not on file   Transportation Needs: Not on file   Physical Activity: Not  on file   Stress: Not on file   Social Connections: Not on file   Intimate Partner Violence: Not on file   Housing Stability: Not on file        Allergies    No Known Allergies    Review of Systems    Pertinent items are noted in HPI.      Physical Exam    Oncology Vitals 2/15/2023   Height -   Weight -   BSA (m2) -   /65   Pulse 74   Temp -   Temp src -   SpO2 94   Pain Score -   Some recent data might be hidden       General: alert and cooperative  HEENT: Head: Normal, normocephalic, atraumatic.  Eye: Normal external eye, conjunctiva, lids cornea, CASSIUS.  Chest: Clear to auscultation bilaterally  Cardiac: S1, S2 normal, regular rate and rhythm  Abdomen: abdomen is soft without significant tenderness, masses, organomegaly or guarding  Extremities: atraumatic, no peripheral edema  Skin:   CNS: Alert and oriented x3, neurologic exam grossly normal.  Lymphatics: No bilateral cervical, axillary, supraclavicular or inguinal adenopathy noted      Lab Results    No results found for this or any previous visit (from the past 168 hour(s)).    Imaging Results    MA Post Procedure Left    Result Date: 2/15/2023  INDICATION: Pre-operative localization of invasive ductal carcinoma at 2 o'clock, 8 cm from the nipple. PROCEDURE: Informed consent was obtained from the patient. The breast was cleansed with ChloraPrep. Lidocaine was used for local anesthesia. A -gauge needle was then advanced to the area of abnormality. A localization wire was then deployed. 492uCi 99mTc Lymphoseek was injected subdermally along the upper outer aspect of the areolar margin. Post-procedure mammograms demonstrate the localization wire in appropriate position. The previously placed marker was visualized. The patient tolerated this well.    IMPRESSION: Sonographically guided  wire localization. A specimen was sent for radiography. Specimen radiograph demonstrates the area of abnormality and the localization wire to be included in the specimen.      MA Breast Specimen Left    Result Date: 2/15/2023  INDICATION: Pre-operative localization of invasive ductal carcinoma at 2 o'clock, 8 cm from the nipple. PROCEDURE: Informed consent was obtained from the patient. The breast was cleansed with ChloraPrep. Lidocaine was used for local anesthesia. A -gauge needle was then advanced to the area of abnormality. A localization wire was then deployed. 492uCi 99mTc Lymphoseek was injected subdermally along the upper outer aspect of the areolar margin. Post-procedure mammograms demonstrate the localization wire in appropriate position. The previously placed marker was visualized. The patient tolerated this well.    IMPRESSION: Sonographically guided  wire localization. A specimen was sent for radiography. Specimen radiograph demonstrates the area of abnormality and the localization wire to be included in the specimen.     Image Guided Breast Localization w Sent Node Inj Left    Result Date: 2/15/2023  INDICATION: Pre-operative localization of invasive ductal carcinoma at 2 o'clock, 8 cm from the nipple. PROCEDURE: Informed consent was obtained from the patient. The breast was cleansed with ChloraPrep. Lidocaine was used for local anesthesia. A -gauge needle was then advanced to the area of abnormality. A localization wire was then deployed. 492uCi 99mTc Lymphoseek was injected subdermally along the upper outer aspect of the areolar margin. Post-procedure mammograms demonstrate the localization wire in appropriate position. The previously placed marker was visualized. The patient tolerated this well.    IMPRESSION: Sonographically guided  wire localization. A specimen was sent for radiography. Specimen radiograph demonstrates the area of abnormality and the localization wire to be included in the specimen.       A total of 60 minutes was spent today on this visit including face to face conversation with the patient, EMR review (labs, imaging studies, pathology reports and  outside records), counseling and care co-ordination and documentation.    Signed by: Jorge Guevara MD        Again, thank you for allowing me to participate in the care of your patient.        Sincerely,        Jorge Guevara MD

## 2023-02-01 DIAGNOSIS — Z86.718 PERSONAL HISTORY OF DVT (DEEP VEIN THROMBOSIS): ICD-10-CM

## 2023-02-01 RX ORDER — WARFARIN SODIUM 5 MG/1
TABLET ORAL
Qty: 105 TABLET | Refills: 1 | Status: SHIPPED | OUTPATIENT
Start: 2023-02-01 | End: 2023-05-25

## 2023-02-01 NOTE — TELEPHONE ENCOUNTER
ANTICOAGULATION MANAGEMENT:  Medication Refill    Anticoagulation Summary  As of 1/20/2023    Warfarin maintenance plan:  7.5 mg (5 mg x 1.5) every Sun, Tue, Thu; 5 mg (5 mg x 1) all other days   Next INR check:  2/17/2023   Target end date:  Indefinite    Indications    Personal history of DVT (deep vein thrombosis) [Z86.718]             Anticoagulation Care Providers     Provider Role Specialty Phone number    Davidson Alegria MD Referring Family Medicine [phone]          Visit with referring provider/group within last year: Yes    [facility] referral signed within last year: Yes    Renée meets all criteria for refill (current [facility] referral, office visit with referring provider/group in last year, lab monitoring up to date or not exceeding 2 weeks overdue). Rx instructions and quantity supplied updated to match patient's current dosing plan. Warfarin 90 day supply with 1 refill granted per [facility] protocol     Rajwinder Bunch RN  Anticoagulation Clinic

## 2023-02-02 ENCOUNTER — TELEPHONE (OUTPATIENT)
Dept: SURGERY | Facility: CLINIC | Age: 58
End: 2023-02-02
Payer: COMMERCIAL

## 2023-02-02 NOTE — LETTER
We've received instruction to get you scheduled for surgery with Dr Tsang. We have that arranged as follows:     Pre-op Physical:  Call your primary clinic to schedule.    Surgery Date: 2/15/2023     Location: [facility] [address] [address]    Approximate Arrival Time: 8:15 am  (Unless instructed differently by the pre-op call nurse)     [facility] op [facility]: [facility] [address] [place] [address] [address], [place] [address].    Prep Tasks and Info:     1. Schedule a pre-op physical with your primary care doctor within 30 days of surgery. This is required by anesthesia and if not done, your surgery will be cancelled. Call them asap to get this scheduled.    2. Review your medications with your primary care or prescribing physician; they will advise you which meds to stop and when, and when you can resume taking.  Certain medications like blood thinners, need to be stopped in advance of surgery to proceed safely.    3. You must get tested for COVID-19, even if you are vaccinated.    Outpatient Surgery:  If you are going home the same day of surgery, a home rapid antigen Covid-19 test is required 1-2 days before surgery- regardless of your vaccination status.  Take a photo of the negative result and show to the nurse on the day of surgery. If you test positive, contact our office right away to reschedule surgery. You can buy a home Covid-19 Rapid Antigen test at many local pharmacies, or you can order for free at covid.gov/tests.      4. Please shower the evening before and morning of surgery with Hibiclens or Exidine soap.  This can be found at your local pharmacy. Follow the links below for detailed instructions for preoperative skin preparation:     Showering Before [id number]      Preparing Your Skin Before Surgery - When you can't take a [id number]     5. Fasting instructions will be  provided by the pre-op nurse who will call you 1-3 days before surgery.  Typically we advise normal food up to 8 hours before you arrive for surgery. Clear liquids only from then until 2 hours before you arrive surgery then nothing at all by mouth.  The nurse will review your specific instructions with you at the call.     6. Smoking impacts your body's ability to heal properly.  If you are a smoker, we strongly urge you to stop smoking 4-6 weeks before surgery.    7. You will need an adult to drive you home and stay with you 24 hours after surgery. Public transportation or Medical Van Services are not permitted.    8. You may have one family member wait in the lobby at the surgery center during your surgery. Visitor restrictions are subject to change, please verify with the pre-op nurse when they call.    9. If the community sees a new COVID19 surge, your procedure may need to be postponed. We will contact you if this happens. You will be screened for high-risk exposure to Covid-19 during the pre-op call.  We encourage you to quarantine yourself away from any Covid-19 people for 10 days before surgery to avoid possible last minute cancellations.   When you arrive to the surgery center, you will again be screened for COVID19 symptoms. If you screen positive, your surgery will need to be postponed.    10. We always encourage you to notify your insurance any time you have medical tests or procedures scheduled including surgery. The number is usually right on the back of your insurance card. Please call [facility] Cost of Care at [phone] if you'd like a surgery quote.      Preparing for Your Surgery [id number]     Call our office if you have any questions! Thank you!

## 2023-02-02 NOTE — TELEPHONE ENCOUNTER
Spoke with patient today regarding surgery scheduling     Went over details/instructions.    Surgery Letter sent via [employer]

## 2023-02-06 PROBLEM — Z17.0 MALIGNANT NEOPLASM OF UPPER-OUTER QUADRANT OF LEFT BREAST IN FEMALE, ESTROGEN RECEPTOR POSITIVE (H): Status: ACTIVE | Noted: 2023-02-06

## 2023-02-06 PROBLEM — C50.412 MALIGNANT NEOPLASM OF UPPER-OUTER QUADRANT OF LEFT BREAST IN FEMALE, ESTROGEN RECEPTOR POSITIVE (H): Status: ACTIVE | Noted: 2023-02-06

## 2023-02-09 ENCOUNTER — ANTICOAGULATION THERAPY VISIT (OUTPATIENT)
Dept: ANTICOAGULATION | Facility: CLINIC | Age: 58
End: 2023-02-09

## 2023-02-09 ENCOUNTER — OFFICE VISIT (OUTPATIENT)
Dept: FAMILY MEDICINE | Facility: CLINIC | Age: 58
End: 2023-02-09
Payer: COMMERCIAL

## 2023-02-09 VITALS
RESPIRATION RATE: 16 BRPM | HEART RATE: 93 BPM | OXYGEN SATURATION: 99 % | SYSTOLIC BLOOD PRESSURE: 136 MMHG | TEMPERATURE: 97.1 F | HEIGHT: 64 IN | DIASTOLIC BLOOD PRESSURE: 84 MMHG | WEIGHT: 181.25 LBS | BODY MASS INDEX: 30.94 KG/M2

## 2023-02-09 DIAGNOSIS — Z01.818 PREOP GENERAL PHYSICAL EXAM: Primary | ICD-10-CM

## 2023-02-09 DIAGNOSIS — C50.912 MALIGNANT NEOPLASM OF LEFT FEMALE BREAST, UNSPECIFIED ESTROGEN RECEPTOR STATUS, UNSPECIFIED SITE OF BREAST (H): ICD-10-CM

## 2023-02-09 DIAGNOSIS — E78.5 HYPERLIPIDEMIA LDL GOAL <100: ICD-10-CM

## 2023-02-09 DIAGNOSIS — Z86.718 PERSONAL HISTORY OF DVT (DEEP VEIN THROMBOSIS): ICD-10-CM

## 2023-02-09 DIAGNOSIS — Z11.4 SCREENING FOR HIV (HUMAN IMMUNODEFICIENCY VIRUS): ICD-10-CM

## 2023-02-09 DIAGNOSIS — Z86.718 PERSONAL HISTORY OF DVT (DEEP VEIN THROMBOSIS): Primary | ICD-10-CM

## 2023-02-09 LAB
ALBUMIN SERPL-MCNC: 3.9 G/DL (ref 3.4–5)
ALP SERPL-CCNC: 51 U/L (ref 40–150)
ALT SERPL W P-5'-P-CCNC: 43 U/L (ref 0–50)
ANION GAP SERPL CALCULATED.3IONS-SCNC: 6 MMOL/L (ref 3–14)
AST SERPL W P-5'-P-CCNC: 20 U/L (ref 0–45)
BASOPHILS # BLD AUTO: 0 10E3/UL (ref 0–0.2)
BASOPHILS NFR BLD AUTO: 0 %
BILIRUB SERPL-MCNC: 0.4 MG/DL (ref 0.2–1.3)
BUN SERPL-MCNC: 7 MG/DL (ref 7–30)
CALCIUM SERPL-MCNC: 8.9 MG/DL (ref 8.5–10.1)
CHLORIDE BLD-SCNC: 107 MMOL/L (ref 94–109)
CHOLEST SERPL-MCNC: 205 MG/DL
CO2 SERPL-SCNC: 25 MMOL/L (ref 20–32)
CREAT SERPL-MCNC: 0.69 MG/DL (ref 0.52–1.04)
EOSINOPHIL # BLD AUTO: 0.1 10E3/UL (ref 0–0.7)
EOSINOPHIL NFR BLD AUTO: 2 %
ERYTHROCYTE [DISTWIDTH] IN BLOOD BY AUTOMATED COUNT: 13.6 % (ref 10–15)
FASTING STATUS PATIENT QL REPORTED: ABNORMAL
GFR SERPL CREATININE-BSD FRML MDRD: >90 ML/MIN/1.73M2
GLUCOSE BLD-MCNC: 97 MG/DL (ref 70–99)
HCT VFR BLD AUTO: 46.5 % (ref 35–47)
HDLC SERPL-MCNC: 94 MG/DL
HGB BLD-MCNC: 15.4 G/DL (ref 11.7–15.7)
INR PPP: 2.4 (ref 0.85–1.15)
LDLC SERPL CALC-MCNC: 99 MG/DL
LYMPHOCYTES # BLD AUTO: 2.1 10E3/UL (ref 0.8–5.3)
LYMPHOCYTES NFR BLD AUTO: 34 %
MCH RBC QN AUTO: 32.4 PG (ref 26.5–33)
MCHC RBC AUTO-ENTMCNC: 33.1 G/DL (ref 31.5–36.5)
MCV RBC AUTO: 98 FL (ref 78–100)
MONOCYTES # BLD AUTO: 0.4 10E3/UL (ref 0–1.3)
MONOCYTES NFR BLD AUTO: 6 %
NEUTROPHILS # BLD AUTO: 3.5 10E3/UL (ref 1.6–8.3)
NEUTROPHILS NFR BLD AUTO: 58 %
NONHDLC SERPL-MCNC: 111 MG/DL
PLATELET # BLD AUTO: 234 10E3/UL (ref 150–450)
POTASSIUM BLD-SCNC: 4.1 MMOL/L (ref 3.4–5.3)
PROT SERPL-MCNC: 7.4 G/DL (ref 6.8–8.8)
RBC # BLD AUTO: 4.76 10E6/UL (ref 3.8–5.2)
SODIUM SERPL-SCNC: 138 MMOL/L (ref 133–144)
TRIGL SERPL-MCNC: 59 MG/DL
WBC # BLD AUTO: 6.1 10E3/UL (ref 4–11)

## 2023-02-09 PROCEDURE — 87389 HIV-1 AG W/HIV-1&-2 AB AG IA: CPT | Performed by: FAMILY MEDICINE

## 2023-02-09 PROCEDURE — 80061 LIPID PANEL: CPT | Performed by: FAMILY MEDICINE

## 2023-02-09 PROCEDURE — 99214 OFFICE O/P EST MOD 30 MIN: CPT | Performed by: FAMILY MEDICINE

## 2023-02-09 PROCEDURE — 80053 COMPREHEN METABOLIC PANEL: CPT | Performed by: FAMILY MEDICINE

## 2023-02-09 PROCEDURE — 85025 COMPLETE CBC W/AUTO DIFF WBC: CPT | Performed by: FAMILY MEDICINE

## 2023-02-09 PROCEDURE — 36415 COLL VENOUS BLD VENIPUNCTURE: CPT | Performed by: FAMILY MEDICINE

## 2023-02-09 PROCEDURE — 85610 PROTHROMBIN TIME: CPT | Performed by: FAMILY MEDICINE

## 2023-02-09 ASSESSMENT — PAIN SCALES - GENERAL: PAINLEVEL: NO PAIN (0)

## 2023-02-09 NOTE — PROGRESS NOTES
ANTICOAGULATION MANAGEMENT     Renée Mcfadden 57 year old female is on warfarin with therapeutic INR result. (Goal INR 2.0-3.0)    Recent labs: (last 7 days)     02/09/23  1040   INR 2.40*       ASSESSMENT       Source(s): Chart Review and Patient/Caregiver Call       Warfarin doses taken: Warfarin taken as instructed    Diet: No new diet changes identified    New illness, injury, or hospitalization: Yes: new diagnosis of breast cancer-patient is scheduled for a lumpectomy on 2/15/23    Medication/supplement changes: None noted    Signs or symptoms of bleeding or clotting: No    Previous INR: Therapeutic last 2(+) visits    Additional findings: Upcoming surgery/procedure 2/15/23- lumpectomy. Patient was advised to hold warfarin with no lovenox bridge per PCP and surgery. Pre-op notes state 5 day hold, patient confirmed with surgery a 4 day hold and will hold warfarin 4 days prior to procedure. Advised to resume warfarin if okay with Provider doing procedure on 2/15/23, or follow their instructions. Recommend rechecking INR approximately 1 week after resuming warfarin.        PLAN     Recommended plan for temporary change(s) affecting INR     Dosing Instructions: Hold warfarin 4 days prior to procedure, resume warfarin if okay with Provider doing procedure on 2/15/23 and recheck INR 7 days after resuming warfarin. Advised to follow Provider instructions if different from [facility] instruction. with next INR in 2 weeks       Summary  As of 2/9/2023    Full warfarin instructions:  2/11: Hold; 2/12: Hold; 2/13: Hold; 2/14: Hold; Otherwise 7.5 mg every Sun, Tue, Thu; 5 mg all other days   Next INR check:  2/24/2023             Telephone call with Geraldine who verbalizes understanding and agrees to plan    Patient offered & declined to schedule next visit    Education provided:     Importance of notifying anticoagulation clinic for: changes in medications; a sooner lab recheck maybe needed, diarrhea, nausea/vomiting, reduced  intake, cold/flu, and/or infections; a sooner lab recheck maybe needed and upcoming surgeries and procedures 2 weeks in advance    Contact [phone]  with any changes, questions or concerns.     Plan made per [facility] anticoagulation protocol    Patricia Chavis RN  Anticoagulation Clinic  2/9/2023    _______________________________________________________________________     Anticoagulation Episode Summary     Current INR goal:  2.0-3.0   TTR:  70.3 % (8.6 mo)   Target end date:  Indefinite   Send INR reminders to:  DOUGIE RAVI    Indications    Personal history of DVT (deep vein thrombosis) [Z86.718]           Comments:           Anticoagulation Care Providers     Provider Role Specialty Phone number    Davidson Alegria MD Referring Family Medicine [phone]

## 2023-02-09 NOTE — PATIENT INSTRUCTIONS
Hold the warfarin for 5 days prior to procedure    No aspirin/ ibuprofen/ naproxen the week prior    We will send you lab results    Use the hibiclens night before and morning of procedure    Take nifedine morning of procedure

## 2023-02-09 NOTE — PROGRESS NOTES
[facility]  [address] [place] [address]  [place] [address]  Phone: [phone]  Primary Provider: Joon Ying  Pre-op Performing Provider: JOON YING       PREOPERATIVE EVALUATION:  Today's date: 2/9/2023    Renée Mcfadden is a 57 year old female who presents for a preoperative evaluation.    Surgical Information:  Surgery/Procedure: left lumpectomy  Sur[facility]  Surgeon: Sharan  Surgery Date: 02/15/2023  Time of Surgery: 08:40am  Where patient plans to recover: At home with family  Fax number for surgical facility: Note does not need to be faxed, will be available electronically in Epic.    Type of Anesthesia Anticipated: General         Subjective     HPI related to upcoming procedure: 57 year old female recently diagnosed with left breast cancer. To have lumpectomy next week at [facility].    Preop Questions 2/9/2023   1. Have you ever had a heart attack or stroke? No   2. Have you ever had surgery on your heart or blood vessels, such as a stent placement, a coronary artery bypass, or surgery on an artery in your head, neck, heart, or legs? No   3. Do you have chest pain with activity? No   4. Do you have a history of  heart failure? No   5. Do you currently have a cold, bronchitis or symptoms of other infection? No   6. Do you have a cough, shortness of breath, or wheezing? No   7. Do you or anyone in your family have previous history of blood clots? YES - personal   8. Do you or does anyone in your family have a serious bleeding problem such as prolonged bleeding following surgeries or cuts? UNKNOWN -    9. Have you ever had problems with anemia or been told to take iron pills? No   10. Have you had any abnormal blood loss such as black, tarry or bloody stools, or abnormal vaginal bleeding? No   11. Have you ever had a blood transfusion? No   12. Are you willing to have a blood transfusion if it is medically needed before, during, or after your surgery?  Yes   13. Have you or any of your relatives ever had problems with anesthesia? YES - Mother    14. Do you have sleep apnea, excessive snoring or daytime drowsiness? UNKNOWN -    15. Do you have any artifical heart valves or other implanted medical devices like a pacemaker, defibrillator, or continuous glucose monitor? No   16. Do you have artificial joints? No   17. Are you allergic to latex? No   18. Is there any chance that you may be pregnant? No       Health Care Directive:  Patient does not have a Health Care Directive or Living Will:     Preoperative Review of :  Patient not on any controlled substance            Review of Systems  Constitutional, neuro, ENT, endocrine, pulmonary, cardiac, gastrointestinal, genitourinary, musculoskeletal, integument and psychiatric systems are negative, except as otherwise noted.    No recent illnesses.    Patient has been careful during pandemic    Had dvt x 2, 20 and and about 2-3 years ago.  On warfarin.    No cardiac history     No chest pain/ breathing problems    Patient Active Problem List    Diagnosis Date Noted     Malignant neoplasm of upper-outer quadrant of left breast in female, estrogen receptor positive (H) 02/06/2023     Priority: Medium     Added automatically from request for surgery [id number]       Mild aortic stenosis 11/14/2022     Priority: Medium     Repeat echo due 2024       Mild mitral stenosis 11/14/2022     Priority: Medium     Anticardiolipin antibody positive 05/13/2021     Priority: Medium     Personal history of DVT (deep vein thrombosis) 11/16/2020     Priority: Medium     Deep vein thrombosis (DVT) of proximal lower extremity, unspecified chronicity, unspecified laterality (H) 10/13/2020     Priority: Medium     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 11/10/2017     Priority: Medium     11/10/17 LSIL pap, + HR HPV 16 and 18. Plan: colp bef 2/10/18  2/13/18 3 month [place] not done, updated to 6mo [place]/Pap due by 5/10/18  3/5/19 Lost to follow-up for  "pap tracking         Raynaud's disease without gangrene 11/06/2015     Priority: Medium     Benign essential hypertension 10/19/2015     Priority: Medium     Smoker 08/27/2013     Priority: Medium      Past Medical History:   Diagnosis Date     DVT (deep venous thrombosis) (H) 2003    Left leg     Hypertension 07/01/2014    last 3-4 years     Papanicolaou smear of cervix with low grade squamous intraepithelial lesion (LGSIL) 11/10/2017     Seizure (H) Age 9    Dilantin prescribed by Dr. Dodge. D/c age 11 .     Smoker 8/27/2013     Past Surgical History:   Procedure Laterality Date     ABDOMEN SURGERY      late 80's-early 90's: Endometriosis     BIOPSY      abnormal paps     GYN SURGERY  1989    D and C     SCLEROTHERAPY Bilateral 2003    Both legs-Vericose veins     Current Outpatient Medications   Medication Sig Dispense Refill     NIFEdipine ER OSMOTIC (PROCARDIA XL) 60 MG 24 hr tablet Take 1 tablet (60 mg) by mouth daily 90 tablet 3     warfarin ANTICOAGULANT (JANTOVEN ANTICOAGULANT) 5 MG tablet Take 1.5 tabs on Tues/Thurs/Sun and 1 tablet all other days,  AS DIRECTED BY [facility] 105 tablet 1     simvastatin (ZOCOR) 20 MG tablet Take 1 tablet (20 mg) by mouth At Bedtime (Patient not taking: Reported on 2/9/2023) 90 tablet 1       No Known Allergies     Social History     Tobacco Use     Smoking status: Every Day     Packs/day: 0.50     Years: 35.00     Pack years: 17.50     Types: Cigarettes     Start date: 1/1/1979     Smokeless tobacco: Never   Substance Use Topics     Alcohol use: Yes     Alcohol/week: 0.0 standard drinks     Comment: 3-4 beers weekly        History   Drug Use No         Objective     BP (!) 145/94 (BP Location: Right arm, Patient Position: Chair, Cuff Size: Adult Regular)   Pulse 93   Temp 97.1  F (36.2  C) (Temporal)   Resp 16   Ht 1.626 m (5' 4\")   Wt 82.2 kg (181 lb 4 oz)   SpO2 99%   BMI 31.11 kg/m      Physical Exam    GENERAL APPEARANCE: healthy, " alert and no distress     EYES: EOMI, PERRL     HENT: ear canals and TM's normal and nose and mouth without ulcers or lesions     NECK: no adenopathy, no asymmetry, masses, or scars and thyroid normal to palpation     RESP: lungs clear to auscultation - no rales, rhonchi or wheezes     CV: regular rates and rhythm and regular murmur present     ABDOMEN:  soft, nontender, no HSM or masses and bowel sounds normal     MS: extremities normal- no gross deformities noted, no evidence of inflammation in joints, FROM in all extremities.     SKIN: no suspicious lesions or rashes     NEURO: Normal strength and tone, sensory exam grossly normal, mentation intact and speech normal     PSYCH: mentation appears normal. and affect normal/bright     LYMPHATICS: No cervical adenopathy    Recent Labs   Lab Test 01/20/23  0936 12/09/22  1035 06/24/21  0713 05/13/21  0737   HGB  --   --   --  14.7   PLT  --   --   --  237   INR 2.1* 2.8*   < > 2.50*   NA  --   --   --  139   POTASSIUM  --   --   --  4.7   CR  --   --   --  0.75    < > = values in this interval not displayed.        Diagnostics:    Labs pending    See echo from last year    ASSESSMENT / PLAN:  (Z01.818) Preop general physical exam  (primary encounter diagnosis)  Comment: patient in stable overall health  Plan: if labs okay then okay for procedure    (C50.912) Malignant neoplasm of left female breast, unspecified estrogen receptor status, unspecified site of breast (H)  Comment: to have lumpectomy   Plan: as above     (Z86.718) Personal history of DVT (deep vein thrombosis)  Comment: patient will hold the warfarin for 5 days prior  Plan: as above       I reviewed the patient's medications, allergies, medical history, family history, and social history.    Davidson Alegria MD              Revised Cardiac Risk Index (RCRI):  The patient has the following serious cardiovascular risks for perioperative complications:   - No serious cardiac risks = 0 points     RCRI  Interpretation: 0 points: Class I (very low risk - 0.4% complication rate)           Signed Electronically by: Davidson Alegria MD  Copy of this evaluation report is provided to requesting physician.

## 2023-02-10 LAB — HIV 1+2 AB+HIV1 P24 AG SERPL QL IA: NONREACTIVE

## 2023-02-14 ENCOUNTER — ANESTHESIA EVENT (OUTPATIENT)
Dept: SURGERY | Facility: AMBULATORY SURGERY CENTER | Age: 58
End: 2023-02-14
Payer: COMMERCIAL

## 2023-02-15 ENCOUNTER — ANCILLARY PROCEDURE (OUTPATIENT)
Dept: MAMMOGRAPHY | Facility: CLINIC | Age: 58
End: 2023-02-15
Attending: SPECIALIST
Payer: COMMERCIAL

## 2023-02-15 ENCOUNTER — HOSPITAL ENCOUNTER (OUTPATIENT)
Facility: AMBULATORY SURGERY CENTER | Age: 58
Discharge: HOME | End: 2023-02-15
Attending: SPECIALIST
Payer: COMMERCIAL

## 2023-02-15 ENCOUNTER — ANTICOAGULATION THERAPY VISIT (OUTPATIENT)
Dept: ANTICOAGULATION | Facility: CLINIC | Age: 58
End: 2023-02-15
Payer: COMMERCIAL

## 2023-02-15 ENCOUNTER — ANESTHESIA (OUTPATIENT)
Dept: SURGERY | Facility: AMBULATORY SURGERY CENTER | Age: 58
End: 2023-02-15
Payer: COMMERCIAL

## 2023-02-15 ENCOUNTER — HOSPITAL ENCOUNTER (OUTPATIENT)
Dept: MAMMOGRAPHY | Facility: CLINIC | Age: 58
Discharge: HOME | End: 2023-02-15
Attending: SPECIALIST
Payer: COMMERCIAL

## 2023-02-15 VITALS
SYSTOLIC BLOOD PRESSURE: 115 MMHG | TEMPERATURE: 97.1 F | WEIGHT: 181.22 LBS | BODY MASS INDEX: 30.94 KG/M2 | OXYGEN SATURATION: 94 % | HEART RATE: 74 BPM | HEIGHT: 64 IN | RESPIRATION RATE: 16 BRPM | DIASTOLIC BLOOD PRESSURE: 65 MMHG

## 2023-02-15 DIAGNOSIS — Z86.718 PERSONAL HISTORY OF DVT (DEEP VEIN THROMBOSIS): Primary | ICD-10-CM

## 2023-02-15 DIAGNOSIS — C50.412 MALIGNANT NEOPLASM OF UPPER-OUTER QUADRANT OF LEFT BREAST IN FEMALE, ESTROGEN RECEPTOR POSITIVE (H): ICD-10-CM

## 2023-02-15 DIAGNOSIS — Z17.0 MALIGNANT NEOPLASM OF UPPER-OUTER QUADRANT OF LEFT BREAST IN FEMALE, ESTROGEN RECEPTOR POSITIVE (H): ICD-10-CM

## 2023-02-15 LAB — INR BLD: 1.2 (ref 2–3)

## 2023-02-15 PROCEDURE — 38525 BIOPSY/REMOVAL LYMPH NODES: CPT | Mod: LT | Performed by: SPECIALIST

## 2023-02-15 PROCEDURE — 250N000009 HC RX 250: Performed by: SPECIALIST

## 2023-02-15 PROCEDURE — 343N000001 HC RX 343: Performed by: SPECIALIST

## 2023-02-15 PROCEDURE — 76098 X-RAY EXAM SURGICAL SPECIMEN: CPT

## 2023-02-15 PROCEDURE — 272N000431 IMAGE GUIDED BREAST LOCALIZATION W SENT NODE INJ LEFT

## 2023-02-15 PROCEDURE — 19125 EXCISION BREAST LESION: CPT | Mod: LT | Performed by: SPECIALIST

## 2023-02-15 PROCEDURE — A9520 TC99 TILMANOCEPT DIAG 0.5MCI: HCPCS | Performed by: SPECIALIST

## 2023-02-15 PROCEDURE — 999N000065 MA POST PROCEDURE LEFT

## 2023-02-15 RX ORDER — IBUPROFEN 600 MG/1
600 TABLET, FILM COATED ORAL
Status: DISCONTINUED | OUTPATIENT
Start: 2023-02-15 | End: 2023-02-16 | Stop reason: HOSPADM

## 2023-02-15 RX ORDER — OXYCODONE HYDROCHLORIDE 5 MG/1
5 TABLET ORAL EVERY 4 HOURS PRN
Status: DISCONTINUED | OUTPATIENT
Start: 2023-02-15 | End: 2023-02-16 | Stop reason: HOSPADM

## 2023-02-15 RX ORDER — LIDOCAINE 40 MG/G
CREAM TOPICAL
Status: DISCONTINUED | OUTPATIENT
Start: 2023-02-15 | End: 2023-02-16 | Stop reason: HOSPADM

## 2023-02-15 RX ORDER — OXYCODONE HYDROCHLORIDE 10 MG/1
10 TABLET ORAL EVERY 4 HOURS PRN
Status: DISCONTINUED | OUTPATIENT
Start: 2023-02-15 | End: 2023-02-16 | Stop reason: HOSPADM

## 2023-02-15 RX ORDER — ACETAMINOPHEN 325 MG/1
975 TABLET ORAL ONCE
Status: DISCONTINUED | OUTPATIENT
Start: 2023-02-15 | End: 2023-02-16 | Stop reason: HOSPADM

## 2023-02-15 RX ORDER — ONDANSETRON 4 MG/1
4 TABLET, ORALLY DISINTEGRATING ORAL EVERY 30 MIN PRN
Status: DISCONTINUED | OUTPATIENT
Start: 2023-02-15 | End: 2023-02-16 | Stop reason: HOSPADM

## 2023-02-15 RX ORDER — HYDROMORPHONE HCL IN WATER/PF 6 MG/30 ML
0.2 PATIENT CONTROLLED ANALGESIA SYRINGE INTRAVENOUS EVERY 5 MIN PRN
Status: DISCONTINUED | OUTPATIENT
Start: 2023-02-15 | End: 2023-02-16 | Stop reason: HOSPADM

## 2023-02-15 RX ORDER — LIDOCAINE HYDROCHLORIDE 20 MG/ML
INJECTION, SOLUTION INFILTRATION; PERINEURAL PRN
Status: DISCONTINUED | OUTPATIENT
Start: 2023-02-15 | End: 2023-02-15

## 2023-02-15 RX ORDER — HYDROMORPHONE HCL IN WATER/PF 6 MG/30 ML
0.4 PATIENT CONTROLLED ANALGESIA SYRINGE INTRAVENOUS EVERY 5 MIN PRN
Status: DISCONTINUED | OUTPATIENT
Start: 2023-02-15 | End: 2023-02-16 | Stop reason: HOSPADM

## 2023-02-15 RX ORDER — FENTANYL CITRATE 0.05 MG/ML
25 INJECTION, SOLUTION INTRAMUSCULAR; INTRAVENOUS EVERY 5 MIN PRN
Status: DISCONTINUED | OUTPATIENT
Start: 2023-02-15 | End: 2023-02-16 | Stop reason: HOSPADM

## 2023-02-15 RX ORDER — FENTANYL CITRATE 0.05 MG/ML
25 INJECTION, SOLUTION INTRAMUSCULAR; INTRAVENOUS
Status: DISCONTINUED | OUTPATIENT
Start: 2023-02-15 | End: 2023-02-16 | Stop reason: HOSPADM

## 2023-02-15 RX ORDER — BUPIVACAINE HYDROCHLORIDE 2.5 MG/ML
INJECTION, SOLUTION INFILTRATION; PERINEURAL PRN
Status: DISCONTINUED | OUTPATIENT
Start: 2023-02-15 | End: 2023-02-15 | Stop reason: HOSPADM

## 2023-02-15 RX ORDER — GLYCOPYRROLATE 0.2 MG/ML
INJECTION, SOLUTION INTRAMUSCULAR; INTRAVENOUS PRN
Status: DISCONTINUED | OUTPATIENT
Start: 2023-02-15 | End: 2023-02-15

## 2023-02-15 RX ORDER — SODIUM CHLORIDE, SODIUM LACTATE, POTASSIUM CHLORIDE, CALCIUM CHLORIDE 600; 310; 30; 20 MG/100ML; MG/100ML; MG/100ML; MG/100ML
INJECTION, SOLUTION INTRAVENOUS CONTINUOUS
Status: DISCONTINUED | OUTPATIENT
Start: 2023-02-15 | End: 2023-02-16 | Stop reason: HOSPADM

## 2023-02-15 RX ORDER — PROPOFOL 10 MG/ML
INJECTION, EMULSION INTRAVENOUS CONTINUOUS PRN
Status: DISCONTINUED | OUTPATIENT
Start: 2023-02-15 | End: 2023-02-15

## 2023-02-15 RX ORDER — ACETAMINOPHEN 325 MG/1
975 TABLET ORAL ONCE
Status: COMPLETED | OUTPATIENT
Start: 2023-02-15 | End: 2023-02-15

## 2023-02-15 RX ORDER — HYDROCODONE BITARTRATE AND ACETAMINOPHEN 5; 325 MG/1; MG/1
1 TABLET ORAL EVERY 6 HOURS PRN
Qty: 15 TABLET | Refills: 0 | Status: SHIPPED | OUTPATIENT
Start: 2023-02-15 | End: 2023-11-29

## 2023-02-15 RX ORDER — HYDROCODONE BITARTRATE AND ACETAMINOPHEN 5; 325 MG/1; MG/1
1 TABLET ORAL
Status: DISCONTINUED | OUTPATIENT
Start: 2023-02-15 | End: 2023-02-16 | Stop reason: HOSPADM

## 2023-02-15 RX ORDER — ACETAMINOPHEN 325 MG/1
975 TABLET ORAL ONCE
Status: DISCONTINUED | OUTPATIENT
Start: 2023-02-15 | End: 2023-02-15

## 2023-02-15 RX ORDER — ONDANSETRON 2 MG/ML
4 INJECTION INTRAMUSCULAR; INTRAVENOUS EVERY 30 MIN PRN
Status: DISCONTINUED | OUTPATIENT
Start: 2023-02-15 | End: 2023-02-16 | Stop reason: HOSPADM

## 2023-02-15 RX ORDER — FENTANYL CITRATE 50 UG/ML
INJECTION, SOLUTION INTRAMUSCULAR; INTRAVENOUS PRN
Status: DISCONTINUED | OUTPATIENT
Start: 2023-02-15 | End: 2023-02-15

## 2023-02-15 RX ORDER — FENTANYL CITRATE 0.05 MG/ML
50 INJECTION, SOLUTION INTRAMUSCULAR; INTRAVENOUS EVERY 5 MIN PRN
Status: DISCONTINUED | OUTPATIENT
Start: 2023-02-15 | End: 2023-02-16 | Stop reason: HOSPADM

## 2023-02-15 RX ADMIN — GLYCOPYRROLATE 0.2 MG: 0.2 INJECTION, SOLUTION INTRAMUSCULAR; INTRAVENOUS at 09:56

## 2023-02-15 RX ADMIN — FENTANYL CITRATE 50 MCG: 50 INJECTION, SOLUTION INTRAMUSCULAR; INTRAVENOUS at 09:56

## 2023-02-15 RX ADMIN — Medication 50 MCG: at 10:17

## 2023-02-15 RX ADMIN — SODIUM CHLORIDE, SODIUM LACTATE, POTASSIUM CHLORIDE, CALCIUM CHLORIDE: 600; 310; 30; 20 INJECTION, SOLUTION INTRAVENOUS at 09:46

## 2023-02-15 RX ADMIN — LIDOCAINE HYDROCHLORIDE 2 ML: 20 INJECTION, SOLUTION INFILTRATION; PERINEURAL at 09:56

## 2023-02-15 RX ADMIN — ACETAMINOPHEN 975 MG: 325 TABLET ORAL at 08:15

## 2023-02-15 RX ADMIN — TILMANOCEPT 0.49 MILLICURIE: KIT at 08:31

## 2023-02-15 RX ADMIN — FENTANYL CITRATE 25 MCG: 50 INJECTION, SOLUTION INTRAMUSCULAR; INTRAVENOUS at 10:20

## 2023-02-15 RX ADMIN — PROPOFOL 200 MCG/KG/MIN: 10 INJECTION, EMULSION INTRAVENOUS at 09:56

## 2023-02-15 RX ADMIN — LIDOCAINE HYDROCHLORIDE 10 ML: 10 INJECTION, SOLUTION INFILTRATION; PERINEURAL at 09:01

## 2023-02-15 RX ADMIN — FENTANYL CITRATE 25 MCG: 50 INJECTION, SOLUTION INTRAMUSCULAR; INTRAVENOUS at 10:12

## 2023-02-15 ASSESSMENT — LIFESTYLE VARIABLES: TOBACCO_USE: 1

## 2023-02-15 NOTE — INTERVAL H&P NOTE
"I have reviewed the surgical (or preoperative) H&P that is linked to this encounter, and examined the patient. There are no significant changes    Clinical Conditions Present on Arrival:  Clinically Significant Risk Factors Present on Admission                  # Drug Induced Coagulation Defect: home medication list includes an anticoagulant medication   # Obesity: Estimated body mass index is 31.11 kg/m  as calculated from the following:    Height as of this encounter: 1.626 m (5' 4\").    Weight as of this encounter: 82.2 kg (181 lb 3.5 oz).       "

## 2023-02-15 NOTE — PROGRESS NOTES
Had conversation with patient regarding her Norco Rx being sent to her mail-order pharmacy.  Patient states this is fine as she probably won't be using it. She states that the pharmacy overnight mails her the meds.  Emelyn Wang RN on 2/15/2023 at 10:51 AM

## 2023-02-15 NOTE — PROGRESS NOTES
ANTICOAGULATION MANAGEMENT     Renée Mcfadden 57 year old female is on warfarin with subtherapeutic INR result. (Goal INR 2.0-3.0)    Recent labs: (last 7 days)     02/15/23  0815   INR 1.2*       ASSESSMENT       Source(s): Chart review    Previous INR: Therapeutic last 2(+) visits    Additional findings:    Per chart review: Upcoming surgery/ procedure 2/15/23- lumpectomy. Patient was advised to hold warfarin with no lovenox bridge per PCP and surgery. Pre-op notes state 5 day hold, patient confirmed with surgery a 4 day hold and will hold warfarin 4 days prior to procedure. Advised to resume warfarin if okay with Provider doing procedure on 2/15/23, or follow their instructions. Recommend rechecking INR approximately 1 week after resuming warfarin.      PLAN     Recommended plan for temporary change(s) affecting INR     Dosing Instructions: Continue your current warfarin dose per provider doing procedure with next INR in 1 week       Summary  As of 2/15/2023    Full warfarin instructions:  7.5 mg every Sun, Tue, Thu; 5 mg all other days   Next INR check:  2/24/2023             Pt previously given dosing instructions prior to procedure (see 2/9/23)    Contact [phone]  to schedule and with any changes, questions or concerns.     Education provided:     Please call back if any changes to your diet, medications or how you've been taking warfarin    Plan made per [facility] anticoagulation protocol    Ethel Aadms RN  Anticoagulation Clinic  2/15/2023    _______________________________________________________________________     Anticoagulation Episode Summary     Current INR goal:  2.0-3.0   TTR:  69.4 % (8.8 mo)   Target end date:  Indefinite   Send INR reminders to:  DOUGIE RAVI    Indications    Personal history of DVT (deep vein thrombosis) [Z86.718]           Comments:           Anticoagulation Care Providers     Provider Role Specialty Phone number    Davidson Alegria MD Referring Family Medicine  [phone]

## 2023-02-15 NOTE — ANESTHESIA CARE TRANSFER NOTE
Patient: Renée Mcfadden    Procedure: Procedure(s):  Left Lumpectomy after Wire Localization; [place] Lymph Node Biopsy       Diagnosis: Malignant neoplasm of upper-outer quadrant of left breast in female, estrogen receptor positive (H) [C50.412, Z17.0]  Diagnosis Additional Information: No value filed.    Anesthesia Type:   MAC     Note:    Oropharynx: oropharynx clear of all foreign objects and spontaneously breathing  Level of Consciousness: awake  Oxygen Supplementation: room air    Independent Airway: airway patency satisfactory and stable  Dentition: dentition unchanged  Vital Signs Stable: post-procedure vital signs reviewed and stable  Report to RN Given: handoff report given  Patient transferred to: Phase II    Handoff Report: Identifed the Patient, Identified the Reponsible Provider, Reviewed the pertinent medical history, Discussed the surgical course, Reviewed Intra-OP anesthesia mangement and issues during anesthesia, Set expectations for post-procedure period and Allowed opportunity for questions and acknowledgement of understanding      Vitals:  Vitals Value Taken Time   /57 02/15/23 1032   Temp 97.1  F (36.2  C) 02/15/23 1032   Pulse 75 02/15/23 1032   Resp 16 02/15/23 1032   SpO2 93 % 02/15/23 1032       Electronically Signed By: RYANNE Wilks CRNA  February 15, 2023  10:36 AM

## 2023-02-15 NOTE — ANESTHESIA PREPROCEDURE EVALUATION
Anesthesia Pre-Procedure Evaluation    Patient: Renée Mcfadden   MRN: [id number] : 1965        Procedure : Procedure(s):  Left Lumpectomy after Wire Localization; [place] Lymph Node Biopsy          Past Medical History:   Diagnosis Date     DVT (deep venous thrombosis) (H)     Left leg     Hypertension 2014    last 3-4 years     Papanicolaou smear of cervix with low grade squamous intraepithelial lesion (LGSIL) 11/10/2017     Seizure (H) Age 9    Dilantin prescribed by Dr. Dodge. D/c age 11 .     Smoker 2013      Past Surgical History:   Procedure Laterality Date     ABDOMEN SURGERY      late s-early s: Endometriosis     BIOPSY      abnormal paps     GYN SURGERY      D and C     SCLEROTHERAPY Bilateral     Both legs-Vericose veins      No Known Allergies   Social History     Tobacco Use     Smoking status: Every Day     Packs/day: 0.50     Years: 35.00     Pack years: 17.50     Types: Cigarettes     Start date: 1979     Smokeless tobacco: Never   Substance Use Topics     Alcohol use: Yes     Alcohol/week: 0.0 standard drinks     Comment: 3-4 beers weekly      Wt Readings from Last 1 Encounters:   02/15/23 82.2 kg (181 lb 3.5 oz)        Anesthesia Evaluation   Pt has had prior anesthetic.     No history of anesthetic complications       ROS/MED HX  ENT/Pulmonary:  - neg pulmonary ROS   (+) tobacco use, Current use,     Neurologic:  - neg neurologic ROS     Cardiovascular:  - neg cardiovascular ROS   (+) hypertension-----valvular problems/murmurs mild AS (FUNMI 1.5) and MS.     METS/Exercise Tolerance: >4 METS    Hematologic:  - neg hematologic  ROS   (+) History of blood clots (h/o DVT; anticardiolipin antibody - on chronic coumadin, last dose 2/10/23), pt is anticoagulated,     Musculoskeletal:  - neg musculoskeletal ROS     GI/Hepatic:  - neg GI/hepatic ROS     Renal/Genitourinary:  - neg Renal ROS     Endo:  - neg endo ROS   (+) Obesity (bmi 31),      Psychiatric/Substance Use:  - neg psychiatric ROS     Infectious Disease:  - neg infectious disease ROS     Malignancy:  - neg malignancy ROS (+) Malignancy, History of Breast.Breast CA Active status post.        Other:  - neg other ROS          Physical Exam    Airway        Mallampati: II   TM distance: > 3 FB   Neck ROM: full   Mouth opening: > 3 cm    Respiratory Devices and Support         Dental       (+) Modest Abnormalities - crowns, retainers, 1 or 2 missing teeth      Cardiovascular          Rhythm and rate: regular and normal   (+) murmur       Pulmonary   pulmonary exam normal                OUTSIDE LABS:  CBC:   Lab Results   Component Value Date    WBC 6.1 02/09/2023    WBC 6.8 05/13/2021    HGB 15.4 02/09/2023    HGB 14.7 05/13/2021    HCT 46.5 02/09/2023    HCT 44.2 05/13/2021     02/09/2023     05/13/2021     BMP:   Lab Results   Component Value Date     02/09/2023     05/13/2021    POTASSIUM 4.1 02/09/2023    POTASSIUM 4.7 05/13/2021    CHLORIDE 107 02/09/2023    CHLORIDE 109 05/13/2021    CO2 25 02/09/2023    CO2 27 05/13/2021    BUN 7 02/09/2023    BUN 9 05/13/2021    CR 0.69 02/09/2023    CR 0.75 05/13/2021    GLC 97 02/09/2023    GLC 88 05/13/2021     COAGS:   Lab Results   Component Value Date    INR 2.40 (H) 02/09/2023     POC: No results found for: BGM, HCG, HCGS  HEPATIC:   Lab Results   Component Value Date    ALBUMIN 3.9 02/09/2023    PROTTOTAL 7.4 02/09/2023    ALT 43 02/09/2023    AST 20 02/09/2023    ALKPHOS 51 02/09/2023    BILITOTAL 0.4 02/09/2023     OTHER:   Lab Results   Component Value Date    JORDAN 8.9 02/09/2023    TSH 3.52 05/13/2021       Anesthesia Plan    ASA Status:  3   NPO Status:  NPO Appropriate    Anesthesia Type: MAC.     - Reason for MAC: straight local not clinically adequate, immobility needed   Induction: Intravenous, Propofol.   Maintenance: TIVA.        Consents    Anesthesia Plan(s) and associated risks, benefits, and realistic  alternatives discussed. Questions answered and patient/representative(s) expressed understanding.    - Discussed:     - Discussed with:  Patient         Postoperative Care    Pain management: Multi-modal analgesia.   PONV prophylaxis: Ondansetron (or other 5HT-3), Dexamethasone or Solumedrol     Comments:    Other Comments: MAC - propofol gtt, versed/fentanyl/ketamine bolus PRN for pain  Decadron/Zofran for antiemesis  Convert to General with LMA if unable to maintain adequate SpO2 on reduced FiO2 (<30%)  Fire precautions  Reviewed anesthetic options and risks. Patient agrees to proceed.             Inderjit Macias MD

## 2023-02-15 NOTE — ANESTHESIA POSTPROCEDURE EVALUATION
Patient: Renée Mcfadden    Procedure: Procedure(s):  Left Lumpectomy after Wire Localization; [place] Lymph Node Biopsy       Anesthesia Type:  MAC    Note:  Disposition: Outpatient   Postop Pain Control: Uneventful            Sign Out: Well controlled pain   PONV: No   Neuro/Psych: Uneventful            Sign Out: Acceptable/Baseline neuro status   Airway/Respiratory: Uneventful            Sign Out: Acceptable/Baseline resp. status   CV/Hemodynamics: Uneventful            Sign Out: Acceptable CV status; No obvious hypovolemia; No obvious fluid overload   Other NRE: NONE   DID A NON-ROUTINE EVENT OCCUR? No           Last vitals:  Vitals Value Taken Time   /55 02/15/23 1053   Temp 97.1  F (36.2  C) 02/15/23 1032   Pulse 77 02/15/23 1057   Resp 16 02/15/23 1032   SpO2 94 % 02/15/23 1057   Vitals shown include unvalidated device data.    Electronically Signed By: Inderjit Macias MD  February 15, 2023  11:00 AM

## 2023-02-15 NOTE — OP NOTE
Operative Note    Name:  Renée Mcfadden  PCP:  Davidson Alegria  Procedure Date:  2/15/2023       Procedure:  Procedure(s):  Left Lumpectomy after Wire Localization; [place] Lymph Node Biopsy     Pre-Procedure Diagnosis:  Malignant neoplasm of upper-outer quadrant of left breast in female, estrogen receptor positive (H) [C50.412, Z17.0]     Post-Procedure Diagnosis:    Same     Surgeon(s):  Belgica Tsang MD     Assistant: None      Anesthesia Type:  MAC       Findings:  Normal appearing SLN.    Operative Report:    The patient was brought to the operating suite where she was placed in the supine position.  She was prepped and draped in a sterile fashion.  After infiltration with quarter percent Marcaine a curvilinear incision was made adjacent to the wire in the extreme upper outer left breast.  This was carried down through the subcutaneous tissues and then to the crimp in the wire and then around the end of the wire  widely with electrocautery.  Once removed the specimen was marked and sent to radiology and then  pathology for permanent sectioning.  The dissection did bring me essentially down to chest wall.  Since it is already at the edge of the axilla I then dissected up into the axillary fat pad and using the gamma probe identified 1 sentinel lymph node.  It had a very gross normal appearance and there is nothing else palpable of concern in the axilla.  The wound was inspected for hemostasis and closed with 3-0 Vicryl to the subcutaneous tissues and a subcuticular stitch of 4-0 Monocryl to the skin.  Dressings were placed.  The patient tolerated the procedure well.        Estimated Blood Loss:   5cc    Specimens:    ID Type Source Tests Collected by Time Destination   1 :  Lumpectomy Breast, Left SURGICAL PATHOLOGY EXAM Belgica Tsang MD 2/15/2023 10:13 AM    2 :  Tissue Lymph Node(s) [place], Breast, Left SURGICAL PATHOLOGY EXAM Belgica Tsang MD 2/15/2023 10:20 AM            Drains:         Complications:    None    Belgica Tsang MD     Date: 2/15/2023  Time: 10:33 AM      [place] Node Biopsy for Breast Cancer - Left  Operation performed with curative intent Yes   Tracer(s) used to identify sentinel nodes in the upfront surgery (non-neoadjuvant) setting Radioactive tracer   Tracer(s) used to identify sentinel nodes in the neoadjuvant setting N/A   All nodes (colored or non-colored) present at the end of a dye-filled lymphatic channel were removed N/A   All significantly radioactive nodes were removed Yes   All palpably suspicious nodes were removed N/A   Biopsy-proven positive nodes marked with clips prior to chemotherapy were identified and removed N/A

## 2023-02-15 NOTE — DISCHARGE INSTRUCTIONS
You have received 975 mg of Acetaminophen (Tylenol) at 8:15AM. Please do not take an additional dose of Tylenol until after 2:15PM     Do not exceed 4,000 mg of acetaminophen during a 24 hour period and keep in mind that acetaminophen can also be found in many over-the-counter cold medications as well as narcotics that may be given for pain.

## 2023-02-21 ENCOUNTER — DOCUMENTATION ONLY (OUTPATIENT)
Dept: SURGERY | Facility: CLINIC | Age: 58
End: 2023-02-21
Payer: COMMERCIAL

## 2023-02-21 NOTE — PROGRESS NOTES
Per Dr. Tsang's  request, order for Oncotype submitted through [facility] online portal.

## 2023-03-03 ENCOUNTER — MYC MEDICAL ADVICE (OUTPATIENT)
Dept: ANTICOAGULATION | Facility: CLINIC | Age: 58
End: 2023-03-03
Payer: COMMERCIAL

## 2023-03-10 ENCOUNTER — TELEPHONE (OUTPATIENT)
Dept: ANTICOAGULATION | Facility: CLINIC | Age: 58
End: 2023-03-10
Payer: COMMERCIAL

## 2023-03-10 ENCOUNTER — TELEPHONE (OUTPATIENT)
Dept: SURGERY | Facility: CLINIC | Age: 58
End: 2023-03-10
Payer: COMMERCIAL

## 2023-03-10 NOTE — TELEPHONE ENCOUNTER
ANTICOAGULATION     Renée Mcfadden is overdue for INR check.      Spoke with Geraldine and scheduled lab appointment on 3-17-23    Edilia Dubois RN

## 2023-03-10 NOTE — TELEPHONE ENCOUNTER
Called Geraldine with the results of her Oncotype, 20, per Dr. Tsang's request.  Patient has a follow up appointment with Dr. Guevara 4-7-23.  Support provided, invited calls.

## 2023-03-24 ENCOUNTER — ANTICOAGULATION THERAPY VISIT (OUTPATIENT)
Dept: ANTICOAGULATION | Facility: CLINIC | Age: 58
End: 2023-03-24

## 2023-03-24 ENCOUNTER — LAB (OUTPATIENT)
Dept: LAB | Facility: CLINIC | Age: 58
End: 2023-03-24
Payer: COMMERCIAL

## 2023-03-24 DIAGNOSIS — Z86.718 PERSONAL HISTORY OF DVT (DEEP VEIN THROMBOSIS): Primary | ICD-10-CM

## 2023-03-24 DIAGNOSIS — Z86.718 PERSONAL HISTORY OF DVT (DEEP VEIN THROMBOSIS): ICD-10-CM

## 2023-03-24 LAB — INR BLD: 2.8 (ref 0.9–1.1)

## 2023-03-24 PROCEDURE — 36416 COLLJ CAPILLARY BLOOD SPEC: CPT

## 2023-03-24 PROCEDURE — 85610 PROTHROMBIN TIME: CPT

## 2023-03-24 NOTE — PROGRESS NOTES
ANTICOAGULATION MANAGEMENT     Renée Mcfadden 57 year old female is on warfarin with therapeutic INR result. (Goal INR 2.0-3.0)    Recent labs: (last 7 days)     03/24/23  0950   INR 2.8*       ASSESSMENT     Warfarin Lab Questionnaire    Dose in Tablet or Mg 3/24/2023   TAB or MG? tablet (tab)     Pt Rptd Dose [place] MONDAY TUESDAY WEDNESDAY THURSDAY FRIDAY SATURDAY   3/24/2023 1.5 1 1.5 1 1.5 1 1     Warfarin Lab Questionnaire 3/24/2023   Missed doses? No   Medication changes? No   Abnormal bleeding? No   Shortness of breath? No   Injuries or illness since last INR? No   Changes in diet or alcohol? No   Upcoming surgery, procedure? No       Additional findings: None       PLAN     Recommended plan for no diet, medication or health factor changes affecting INR     Dosing Instructions: Continue your current warfarin dose with next INR in 4-5 weeks  (has been over 5 wks since last check)    Summary  As of 3/24/2023    Full warfarin instructions:  7.5 mg every Sun, Tue, Thu; 5 mg all other days   Next INR check:  4/21/2023             Telephone call with Geraldine who verbalizes understanding and agrees to plan    Lab visit scheduled    Education provided:     Goal range and lab monitoring: goal range and significance of current result    Plan made per [facility] anticoagulation protocol    Whitney Draper RN  Anticoagulation Clinic  3/24/2023    _______________________________________________________________________     Anticoagulation Episode Summary     Current INR goal:  2.0-3.0   TTR:  67.0 % (10 mo)   Target end date:  Indefinite   Send INR reminders to:  DOUGIE RAVI    Indications    Personal history of DVT (deep vein thrombosis) [Z86.718]           Comments:           Anticoagulation Care Providers     Provider Role Specialty Phone number    Davidson Alegria MD Referring Family Medicine [phone]

## 2023-04-07 ENCOUNTER — ONCOLOGY VISIT (OUTPATIENT)
Dept: ONCOLOGY | Facility: HOSPITAL | Age: 58
End: 2023-04-07
Attending: INTERNAL MEDICINE
Payer: COMMERCIAL

## 2023-04-07 VITALS
HEIGHT: 64 IN | DIASTOLIC BLOOD PRESSURE: 83 MMHG | BODY MASS INDEX: 30.08 KG/M2 | OXYGEN SATURATION: 99 % | TEMPERATURE: 98.5 F | HEART RATE: 93 BPM | WEIGHT: 176.2 LBS | SYSTOLIC BLOOD PRESSURE: 147 MMHG | RESPIRATION RATE: 18 BRPM

## 2023-04-07 DIAGNOSIS — Z17.0 MALIGNANT NEOPLASM OF UPPER-OUTER QUADRANT OF LEFT BREAST IN FEMALE, ESTROGEN RECEPTOR POSITIVE (H): Primary | ICD-10-CM

## 2023-04-07 DIAGNOSIS — M81.0 AGE-RELATED OSTEOPOROSIS WITHOUT CURRENT PATHOLOGICAL FRACTURE: ICD-10-CM

## 2023-04-07 DIAGNOSIS — C50.412 MALIGNANT NEOPLASM OF UPPER-OUTER QUADRANT OF LEFT BREAST IN FEMALE, ESTROGEN RECEPTOR POSITIVE (H): Primary | ICD-10-CM

## 2023-04-07 PROCEDURE — 99212 OFFICE O/P EST SF 10 MIN: CPT | Performed by: INTERNAL MEDICINE

## 2023-04-07 PROCEDURE — 99214 OFFICE O/P EST MOD 30 MIN: CPT | Performed by: INTERNAL MEDICINE

## 2023-04-07 RX ORDER — LETROZOLE 2.5 MG/1
2.5 TABLET, FILM COATED ORAL DAILY
Qty: 60 TABLET | Refills: 1 | Status: SHIPPED | OUTPATIENT
Start: 2023-04-07 | End: 2023-07-25

## 2023-04-07 ASSESSMENT — PAIN SCALES - GENERAL: PAINLEVEL: MILD PAIN (2)

## 2023-04-07 NOTE — LETTER
4/7/2023         RE: Renée Mcfadden  [address] [facility] [address]        Dear Colleague,    Thank you for referring your patient, Renée Mcfadden, to the [facility] [place]. Please see a copy of my visit note below.    [facility] Hematology and Oncology Progress Note    Patient: Renée Mcfadden  MRN: [id number]  Date of Service: 04/07/2023        Reason for Visit    Chief Complaint   Patient presents with     Oncology Clinic Visit     Malignant neoplasm of upper-outer quadrant of left breast in female, estrogen receptor positive (H)         Problem List Items Addressed This Visit        Other    Malignant neoplasm of upper-outer quadrant of left breast in female, estrogen receptor positive (H) - Primary    Relevant Medications    letrozole (FEMARA) 2.5 MG tablet    Other Relevant Orders    CBC with platelets and differential    Comprehensive metabolic panel (BMP + Alb, Alk Phos, ALT, AST, Total. Bili, TP)   Other Visit Diagnoses     Age-related osteoporosis without current pathological fracture        Relevant Orders    DX Hip/Pelvis/Spine            Assessment and Plan  Early stage left-sided breast cancer hormone receptor positive and HER2 negative  I reviewed her surgical path in detail today.  The tumor measured 10 x 7 x 5 mm in size.  1 sentinel lymph node was removed which was negative for malignancy.  All the margins were negative.  pT1b, pN0, cM0.  Oncotype DX score came back at 20 which predicts no real benefit from adjuvant chemotherapy.  I reviewed all these things with her in detail today.  In this setting recommendation will be to proceed with adjuvant endocrine therapy.      Regarding adjuvant radiation therapy is concerned, due to her history of scleroderma this might be a bit tricky.  Previously it was discussed that if a wide surgical margin could be achieved then potentially we could forego radiation therapy.  I will touch base with them  just to confirm this.  Otherwise from the systemic endocrine therapy standpoint I would recommend starting with aromatase inhibitors with either letrozole or anastrozole.  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  Tamoxifen will not be ideal.  Although aromatase inhibitors do have a slightly increased risk of cardiovascular events and DVTs, the risk is considered to be low.    We will start with letrozole 2.5 mg daily.  Plan is to do a total of 5 years of treatment.  Reviewed the rationale behind this.  We will also get a DEXA scan.  Recommended to start taking vitamin D and calcium.    I will see her back in 3 months with repeat labs.     Cancer Staging   No matching staging information was found for the patient.    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     Deep vein thrombosis (DVT) of proximal lower extremity, unspecified chronicity, unspecified laterality (H)     Personal history of DVT (deep vein thrombosis)     Anticardiolipin antibody positive     Mild aortic stenosis     Mild mitral stenosis     Malignant neoplasm of upper-outer quadrant of left breast in female, estrogen receptor positive (H)        Oncology history      Interval History   Renée Mcfadden is a 57 year old female with a recent history of early-stage left-sided breast cancer status postlumpectomy who is here to discuss adjuvant systemic therapy for the same.    She underwent lumpectomy last month.  Has recovered well from surgery.  Denies any new issues today.  Did have some pain initially which is slowly getting better.  Also had some swelling in the breast area which is probably post surgical.  No signs of infection.        Review of Systems  A comprehensive review of systems was negative except for what is noted in the interval  "history    Current Outpatient Medications   Medication     letrozole (FEMARA) 2.5 MG tablet     NIFEdipine ER OSMOTIC (PROCARDIA XL) 60 MG 24 hr tablet     warfarin ANTICOAGULANT (JANTOVEN ANTICOAGULANT) 5 MG tablet     HYDROcodone-acetaminophen (NORCO) 5-325 MG tablet     No current facility-administered medications for this visit.        Physical Exam    No flowsheet data found.    General: alert and cooperative  HEENT: Head: Normal, normocephalic, atraumatic.  Eye: Normal external eye, conjunctiva, lids cornea, CASSIUS.  CNS: Alert and oriented x3, neurologic exam grossly normal.      Lab Results    No results found for this or any previous visit (from the past 168 hour(s)).    Imaging    No results found.      Signed by: Jorge Guevara MD      Oncology Rooming Note    April 7, 2023 12:13 PM   Renée Mcfadden is a 57 year old female who presents for:    Chief Complaint   Patient presents with     Oncology Clinic Visit     Malignant neoplasm of upper-outer quadrant of left breast in female, estrogen receptor positive (H)     Initial Vitals: BP (!) 147/83   Pulse 93   Temp 98.5  F (36.9  C)   Resp 18   Ht 1.626 m (5' 4\")   Wt 79.9 kg (176 lb 3.2 oz)   SpO2 99%   BMI 30.24 kg/m   Estimated body mass index is 30.24 kg/m  as calculated from the following:    Height as of this encounter: 1.626 m (5' 4\").    Weight as of this encounter: 79.9 kg (176 lb 3.2 oz). Body surface area is 1.9 meters squared.  Mild Pain (2) Comment: Data Unavailable   No LMP recorded. Patient is postmenopausal.  Allergies reviewed: Yes  Medications reviewed: Yes    Medications: Medication refills not needed today.  Pharmacy name entered into [employer]: [facility] [employer] [facility] [employer] [facility] - [facility] AT PORTAL TO REGISTERED [employer] SITES    Clinical concerns: None       Lety Hawk LPN                Again, thank you for allowing me to participate in the care of your patient.  "       Sincerely,        Jorge Guevara MD

## 2023-04-07 NOTE — PROGRESS NOTES
[facility] Hematology and Oncology Progress Note    Patient: Renée Mcfadden  MRN: [id number]  Date of Service: 04/07/2023        Reason for Visit    Chief Complaint   Patient presents with     Oncology Clinic Visit     Malignant neoplasm of upper-outer quadrant of left breast in female, estrogen receptor positive (H)         Problem List Items Addressed This Visit        Other    Malignant neoplasm of upper-outer quadrant of left breast in female, estrogen receptor positive (H) - Primary    Relevant Medications    letrozole (FEMARA) 2.5 MG tablet    Other Relevant Orders    CBC with platelets and differential    Comprehensive metabolic panel (BMP + Alb, Alk Phos, ALT, AST, Total. Bili, TP)   Other Visit Diagnoses     Age-related osteoporosis without current pathological fracture        Relevant Orders    DX Hip/Pelvis/Spine            Assessment and Plan  Early stage left-sided breast cancer hormone receptor positive and HER2 negative  I reviewed her surgical path in detail today.  The tumor measured 10 x 7 x 5 mm in size.  1 sentinel lymph node was removed which was negative for malignancy.  All the margins were negative.  pT1b, pN0, cM0.  Oncotype DX score came back at 20 which predicts no real benefit from adjuvant chemotherapy.  I reviewed all these things with her in detail today.  In this setting recommendation will be to proceed with adjuvant endocrine therapy.      Regarding adjuvant radiation therapy is concerned, due to her history of scleroderma this might be a bit tricky.  Previously it was discussed that if a wide surgical margin could be achieved then potentially we could forego radiation therapy.  I will touch base with them just to confirm this.  Otherwise from the systemic endocrine therapy standpoint I would recommend starting with aromatase inhibitors with either letrozole or anastrozole.  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  Tamoxifen will not be ideal.  Although  aromatase inhibitors do have a slightly increased risk of cardiovascular events and DVTs, the risk is considered to be low.    We will start with letrozole 2.5 mg daily.  Plan is to do a total of 5 years of treatment.  Reviewed the rationale behind this.  We will also get a DEXA scan.  Recommended to start taking vitamin D and calcium.    I will see her back in 3 months with repeat labs.     Cancer Staging   No matching staging information was found for the patient.    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     Deep vein thrombosis (DVT) of proximal lower extremity, unspecified chronicity, unspecified laterality (H)     Personal history of DVT (deep vein thrombosis)     Anticardiolipin antibody positive     Mild aortic stenosis     Mild mitral stenosis     Malignant neoplasm of upper-outer quadrant of left breast in female, estrogen receptor positive (H)        Oncology history      Interval History   Renée Mcfadden is a 57 year old female with a recent history of early-stage left-sided breast cancer status postlumpectomy who is here to discuss adjuvant systemic therapy for the same.    She underwent lumpectomy last month.  Has recovered well from surgery.  Denies any new issues today.  Did have some pain initially which is slowly getting better.  Also had some swelling in the breast area which is probably post surgical.  No signs of infection.        Review of Systems  A comprehensive review of systems was negative except for what is noted in the interval history    Current Outpatient Medications   Medication     letrozole (FEMARA) 2.5 MG tablet     NIFEdipine ER OSMOTIC (PROCARDIA XL) 60 MG 24 hr tablet     warfarin ANTICOAGULANT (JANTOVEN ANTICOAGULANT) 5 MG tablet     HYDROcodone-acetaminophen (NORCO) 5-325 MG tablet     No  current facility-administered medications for this visit.        Physical Exam    No flowsheet data found.    General: alert and cooperative  HEENT: Head: Normal, normocephalic, atraumatic.  Eye: Normal external eye, conjunctiva, lids cornea, CASSIUS.  CNS: Alert and oriented x3, neurologic exam grossly normal.      Lab Results    No results found for this or any previous visit (from the past 168 hour(s)).    Imaging    No results found.      Signed by: Jorge Guevara MD

## 2023-04-07 NOTE — PROGRESS NOTES
"Oncology Rooming Note    April 7, 2023 12:13 PM   Renée Mcfadden is a 57 year old female who presents for:    Chief Complaint   Patient presents with     Oncology Clinic Visit     Malignant neoplasm of upper-outer quadrant of left breast in female, estrogen receptor positive (H)     Initial Vitals: BP (!) 147/83   Pulse 93   Temp 98.5  F (36.9  C)   Resp 18   Ht 1.626 m (5' 4\")   Wt 79.9 kg (176 lb 3.2 oz)   SpO2 99%   BMI 30.24 kg/m   Estimated body mass index is 30.24 kg/m  as calculated from the following:    Height as of this encounter: 1.626 m (5' 4\").    Weight as of this encounter: 79.9 kg (176 lb 3.2 oz). Body surface area is 1.9 meters squared.  Mild Pain (2) Comment: Data Unavailable   No LMP recorded. Patient is postmenopausal.  Allergies reviewed: Yes  Medications reviewed: Yes    Medications: Medication refills not needed today.  Pharmacy name entered into [employer]: [facility] - ONE [facility] AT PORTAL TO REGISTERED [employer] SITES    Clinical concerns: None       Lety Hawk LPN            "

## 2023-04-26 ENCOUNTER — HOSPITAL ENCOUNTER (OUTPATIENT)
Dept: BONE DENSITY | Facility: HOSPITAL | Age: 58
Discharge: HOME | End: 2023-04-26
Attending: INTERNAL MEDICINE | Admitting: INTERNAL MEDICINE
Payer: COMMERCIAL

## 2023-04-26 DIAGNOSIS — M81.0 AGE-RELATED OSTEOPOROSIS WITHOUT CURRENT PATHOLOGICAL FRACTURE: ICD-10-CM

## 2023-04-26 PROCEDURE — 77080 DXA BONE DENSITY AXIAL: CPT

## 2023-04-28 ENCOUNTER — ANTICOAGULATION THERAPY VISIT (OUTPATIENT)
Dept: ANTICOAGULATION | Facility: CLINIC | Age: 58
End: 2023-04-28

## 2023-04-28 ENCOUNTER — MYC MEDICAL ADVICE (OUTPATIENT)
Dept: ANTICOAGULATION | Facility: CLINIC | Age: 58
End: 2023-04-28

## 2023-04-28 ENCOUNTER — LAB (OUTPATIENT)
Dept: LAB | Facility: CLINIC | Age: 58
End: 2023-04-28
Payer: COMMERCIAL

## 2023-04-28 DIAGNOSIS — Z86.718 PERSONAL HISTORY OF DVT (DEEP VEIN THROMBOSIS): Primary | ICD-10-CM

## 2023-04-28 DIAGNOSIS — Z86.718 PERSONAL HISTORY OF DVT (DEEP VEIN THROMBOSIS): ICD-10-CM

## 2023-04-28 LAB — INR BLD: 2.6 (ref 0.9–1.1)

## 2023-04-28 PROCEDURE — 85610 PROTHROMBIN TIME: CPT

## 2023-04-28 PROCEDURE — 36416 COLLJ CAPILLARY BLOOD SPEC: CPT

## 2023-04-28 NOTE — PROGRESS NOTES
ANTICOAGULATION MANAGEMENT     Renée Mcfadden 57 year old female is on warfarin with therapeutic INR result. (Goal INR 2.0-3.0)    Recent labs: (last 7 days)     04/28/23  0922   INR 2.6*       ASSESSMENT       Source(s): Chart Review and Patient/Caregiver Call       Warfarin doses taken: Warfarin taken as instructed    Diet: No new diet changes identified    Medication/supplement changes: Letrazole  started on 4/7/23 No interaction anticipated    New illness, injury, or hospitalization: No    Signs or symptoms of bleeding or clotting: No    Previous result: Therapeutic last 2(+) visits    Additional findings: is considering taking vit d. family member suggested she take vit d with k2         PLAN     Recommended plan for ongoing change(s) affecting INR     Dosing Instructions: Continue your current warfarin dose with next INR in 6 weeks       Summary  As of 4/28/2023    Full warfarin instructions:  7.5 mg every Sun, Tue, Thu; 5 mg all other days   Next INR check:  6/9/2023             Telephone call with Geraldine who verbalizes understanding and agrees to plan    Lab visit scheduled    Education provided:     Please call back if any changes to your diet, medications or how you've been taking warfarin    Goal range and lab monitoring: goal range and significance of current result, Importance of therapeutic range and Importance of following up at instructed interval    Plan made per [facility] anticoagulation protocol    Alyssa Montana RN  Anticoagulation Clinic  4/28/2023    _______________________________________________________________________     Anticoagulation Episode Summary     Current INR goal:  2.0-3.0   TTR:  70.5 % (11.2 mo)   Target end date:  Indefinite   Send INR reminders to:  DOUGIE RAVI    Indications    Personal history of DVT (deep vein thrombosis) [Z86.459]           Comments:           Anticoagulation Care Providers     Provider Role Specialty Phone number    Davidson Alegria MD Referring Family  [facility] [phone]

## 2023-05-24 DIAGNOSIS — Z86.718 PERSONAL HISTORY OF DVT (DEEP VEIN THROMBOSIS): ICD-10-CM

## 2023-05-25 ENCOUNTER — OFFICE VISIT (OUTPATIENT)
Dept: DERMATOLOGY | Facility: CLINIC | Age: 58
End: 2023-05-25
Attending: PHYSICIAN ASSISTANT
Payer: COMMERCIAL

## 2023-05-25 DIAGNOSIS — Z12.83 ENCOUNTER FOR SCREENING FOR MALIGNANT NEOPLASM OF SKIN: ICD-10-CM

## 2023-05-25 DIAGNOSIS — D18.01 CHERRY ANGIOMA: ICD-10-CM

## 2023-05-25 DIAGNOSIS — L30.9 ECZEMA, UNSPECIFIED TYPE: ICD-10-CM

## 2023-05-25 DIAGNOSIS — L81.4 LENTIGINES: Primary | ICD-10-CM

## 2023-05-25 DIAGNOSIS — L82.1 SEBORRHEIC KERATOSES: ICD-10-CM

## 2023-05-25 DIAGNOSIS — D22.9 MULTIPLE BENIGN NEVI: ICD-10-CM

## 2023-05-25 PROCEDURE — 99204 OFFICE O/P NEW MOD 45 MIN: CPT | Performed by: NURSE PRACTITIONER

## 2023-05-25 RX ORDER — WARFARIN SODIUM 5 MG/1
TABLET ORAL
Qty: 105 TABLET | Refills: 1 | Status: SHIPPED | OUTPATIENT
Start: 2023-05-25 | End: 2024-01-22

## 2023-05-25 RX ORDER — TRIAMCINOLONE ACETONIDE 1 MG/G
OINTMENT TOPICAL 2 TIMES DAILY
Qty: 80 G | Refills: 1 | Status: SHIPPED | OUTPATIENT
Start: 2023-05-25

## 2023-05-25 NOTE — LETTER
5/25/2023         RE: Renée Mcfadden  [address] [facility] [address]        Dear Colleague,    Thank you for referring your patient, Renée Mcfadden, to the [facility]. Please see a copy of my visit note below.    [facility] Dermatology Note  Encounter Date: May 25, 2023  Office Visit     Reviewed patients past medical history and pertinent chart review prior to patients visit today.     Dermatology Problem List:  1. Eczematous dermatitis -  triamcinolone 0.1% ointment twice daily   Patient denies personal history of skin cancer or dysplastic nevi.   Patient denies family history of skin cancer or dysplastic nevi.     ____________________________________________    Assessment & Plan:     # Eczematous dermatitis  - Recommended to continue application of Cetaphil cream as needed.   - Advised to use OTC hydrocortisone if eczema is on the face.  -Start triamcinolone 0.1% ointment twice daily for 2-4 weeks, decreasing as patients rash improves, repeat cycle for flares. If not fully resolved in 1 month, patient advised to return to clinic for reevaluation. Discussed risk of skin atrophy with long term chronic use. Not for face, armpits or groin.   -When bathing, use gentle soap such as Dove for Sensitive Skin and lukewarm water on amrpits, groin, and other visibly dirty areas, all other areas let the water run over but no soap is necessary. Pat skin dry instead of vigorous rubbing. Encouraged regular use of an emollient such as Vanicream, Cera Ve Cream or Cetaphil Cream to the entire body.   -Start a humidifier in bedroom when sleeping if possible for patient. Clean regularly.     # Benign skin findings including: seborrheic keratoses, cherry angioma, lentigines and benign nevi.   - No further intervention required. Patient to report changes.   - Patient reassured of the benign nature of these lesions.    #Signs and Symptoms of non-melanoma skin cancer  and ABCDEs of melanoma reviewed with patient. Patient encouraged to perform monthly self skin exams and educated on how to perform them. UV precautions reviewed with patient. Patient was asked about new or changing moles/lesions on body.     #Reviewed Sunscreen: Apply 20 minutes prior to going outdoors and reapply every two hours, when wet or sweating. We recommend using an SPF 30 or higher, and to use one that is water resistant.       Follow-up:  1-2 years for follow up full body skin exam, prn for new or changing lesions or new concerns    Written by Kamala Meléndez working as a scribe for RYANNE Chowdary CNP on 05/25/23    Kiana Ramos CNP  Dermatology    ____________________________________________    CC: No chief complaint on file.    HPI:  Ms. Renée Mcfadden is a(n) 57 year old female who presents today as a new patient for a full body skin cancer screening. Patient has no concerns today about any moles or lesions. She had spots that concerned her 10/2022 but most of those have resolved. She also has itchy skin tags that have gotten better over the last couple weeks. She has Cetaphil lotion that she sometimes applies to the skin tags when she remembers to use it.     Patient is otherwise feeling well, without additional skin concerns.     Physical Exam:  Vitals: There were no vitals taken for this visit.  SKIN: Total skin excluding the genitalia areas was performed. The exam included the head/face, neck, both arms, chest, back, abdomen, both legs, digits, mons pubis, buttock and nails.   - SK on the left flank that is brown and crusted   - Pink scaly patches to the shoulders and upper arms.   -several 1-2mm red dome shaped symmetric papules scattered on the trunk  -multiple tan/brown flat round macules and raised papules scattered throughout trunk, extremities and head. No worrisome features for malignancy noted on examination.  -scattered tan, homogenous macules scattered on sun exposed areas of  trunk, extremities and face.   -scattered waxy, stuck on tan/brown papules and patches on the trunk     - No other lesions of concern on areas examined.     Medications:  Current Outpatient Medications   Medication     HYDROcodone-acetaminophen (NORCO) 5-325 MG tablet     letrozole (FEMARA) 2.5 MG tablet     NIFEdipine ER OSMOTIC (PROCARDIA XL) 60 MG 24 hr tablet     warfarin ANTICOAGULANT (JANTOVEN ANTICOAGULANT) 5 MG tablet     No current facility-administered medications for this visit.      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     Deep vein thrombosis (DVT) of proximal lower extremity, unspecified chronicity, unspecified laterality (H)     Personal history of DVT (deep vein thrombosis)     Anticardiolipin antibody positive     Mild aortic stenosis     Mild mitral stenosis     Malignant neoplasm of upper-outer quadrant of left breast in female, estrogen receptor positive (H)     Past Medical History:   Diagnosis Date     DVT (deep venous thrombosis) (H) 2003    Left leg     Hypertension 07/01/2014    last 3-4 years     Papanicolaou smear of cervix with low grade squamous intraepithelial lesion (LGSIL) 11/10/2017     Seizure (H) Age 9    Dilantin prescribed by Dr. Dodge. D/c age 11 .     Smoker 08/27/2013       CC Sena Sage PA-C  [address] [facility]  [place] [address] on close of this encounter.      Again, thank you for allowing me to participate in the care of your patient.        Sincerely,        Renée Ramos, RYANNE CNP

## 2023-05-25 NOTE — TELEPHONE ENCOUNTER
ANTICOAGULATION MANAGEMENT:  Medication Refill    Anticoagulation Summary  As of 4/28/2023    Warfarin maintenance plan:  7.5 mg (5 mg x 1.5) every Sun, Tue, Thu; 5 mg (5 mg x 1) all other days   Next INR check:  6/9/2023   Target end date:  Indefinite    Indications    Personal history of DVT (deep vein thrombosis) [Z86.718]             Anticoagulation Care Providers     Provider Role Specialty Phone number    Davidson Alegria MD Referring Family Medicine [phone]          Visit with referring provider/group within last year: Yes    [facility] referral signed within last year: Yes    Renée meets all criteria for refill (current [facility] referral, office visit with referring provider/group in last year, lab monitoring up to date or not exceeding 2 weeks overdue). Rx instructions and quantity match patient's current dosing plan. Warfarin 90 day supply with 1 refill granted per [facility] protocol     Farrah Lopez RN  Anticoagulation Clinic

## 2023-05-25 NOTE — PROGRESS NOTES
[facility] Dermatology Note  Encounter Date: May 25, 2023  Office Visit     Reviewed patients past medical history and pertinent chart review prior to patients visit today.     Dermatology Problem List:  1. Eczematous dermatitis -  triamcinolone 0.1% ointment twice daily   Patient denies personal history of skin cancer or dysplastic nevi.   Patient denies family history of skin cancer or dysplastic nevi.     ____________________________________________    Assessment & Plan:     # Eczematous dermatitis  - Recommended to continue application of Cetaphil cream as needed.   - Advised to use OTC hydrocortisone if eczema is on the face.  -Start triamcinolone 0.1% ointment twice daily for 2-4 weeks, decreasing as patients rash improves, repeat cycle for flares. If not fully resolved in 1 month, patient advised to return to clinic for reevaluation. Discussed risk of skin atrophy with long term chronic use. Not for face, armpits or groin.   -When bathing, use gentle soap such as Dove for Sensitive Skin and lukewarm water on amrpits, groin, and other visibly dirty areas, all other areas let the water run over but no soap is necessary. Pat skin dry instead of vigorous rubbing. Encouraged regular use of an emollient such as Vanicream, Cera Ve Cream or Cetaphil Cream to the entire body.   -Start a humidifier in bedroom when sleeping if possible for patient. Clean regularly.     # Benign skin findings including: seborrheic keratoses, cherry angioma, lentigines and benign nevi.   - No further intervention required. Patient to report changes.   - Patient reassured of the benign nature of these lesions.    #Signs and Symptoms of non-melanoma skin cancer and ABCDEs of melanoma reviewed with patient. Patient encouraged to perform monthly self skin exams and educated on how to perform them. UV precautions reviewed with patient. Patient was asked about new or changing moles/lesions on body.     #Reviewed Sunscreen:  Apply 20 minutes prior to going outdoors and reapply every two hours, when wet or sweating. We recommend using an SPF 30 or higher, and to use one that is water resistant.       Follow-up:  1-2 years for follow up full body skin exam, prn for new or changing lesions or new concerns    Written by Kamala Meléndez working as a scribe for Renée GONZALEZ RYANNE Ramos CNP on 05/25/23    Kiana Ramos CNP  Dermatology    ____________________________________________    CC: No chief complaint on file.    HPI:  Ms. Renée Mcfadden is a(n) 57 year old female who presents today as a new patient for a full body skin cancer screening. Patient has no concerns today about any moles or lesions. She had spots that concerned her 10/2022 but most of those have resolved. She also has itchy skin tags that have gotten better over the last couple weeks. She has Cetaphil lotion that she sometimes applies to the skin tags when she remembers to use it.     Patient is otherwise feeling well, without additional skin concerns.     Physical Exam:  Vitals: There were no vitals taken for this visit.  SKIN: Total skin excluding the genitalia areas was performed. The exam included the head/face, neck, both arms, chest, back, abdomen, both legs, digits, mons pubis, buttock and nails.   - SK on the left flank that is brown and crusted   - Pink scaly patches to the shoulders and upper arms.   -several 1-2mm red dome shaped symmetric papules scattered on the trunk  -multiple tan/brown flat round macules and raised papules scattered throughout trunk, extremities and head. No worrisome features for malignancy noted on examination.  -scattered tan, homogenous macules scattered on sun exposed areas of trunk, extremities and face.   -scattered waxy, stuck on tan/brown papules and patches on the trunk     - No other lesions of concern on areas examined.     Medications:  Current Outpatient Medications   Medication     HYDROcodone-acetaminophen (NORCO) 5-325 MG  tablet     letrozole (FEMARA) 2.5 MG tablet     NIFEdipine ER OSMOTIC (PROCARDIA XL) 60 MG 24 hr tablet     warfarin ANTICOAGULANT (JANTOVEN ANTICOAGULANT) 5 MG tablet     No current facility-administered medications for this visit.      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     Deep vein thrombosis (DVT) of proximal lower extremity, unspecified chronicity, unspecified laterality (H)     Personal history of DVT (deep vein thrombosis)     Anticardiolipin antibody positive     Mild aortic stenosis     Mild mitral stenosis     Malignant neoplasm of upper-outer quadrant of left breast in female, estrogen receptor positive (H)     Past Medical History:   Diagnosis Date     DVT (deep venous thrombosis) (H) 2003    Left leg     Hypertension 07/01/2014    last 3-4 years     Papanicolaou smear of cervix with low grade squamous intraepithelial lesion (LGSIL) 11/10/2017     Seizure (H) Age 9    Dilantin prescribed by Dr. Dodge. D/c age 11 .     Smoker 08/27/2013       CC Sena Sage PA-C  [address] [facility]  [place] [address] on close of this encounter.

## 2023-06-09 ENCOUNTER — ANTICOAGULATION THERAPY VISIT (OUTPATIENT)
Dept: ANTICOAGULATION | Facility: CLINIC | Age: 58
End: 2023-06-09

## 2023-06-09 ENCOUNTER — LAB (OUTPATIENT)
Dept: LAB | Facility: CLINIC | Age: 58
End: 2023-06-09
Payer: COMMERCIAL

## 2023-06-09 DIAGNOSIS — Z86.718 PERSONAL HISTORY OF DVT (DEEP VEIN THROMBOSIS): Primary | ICD-10-CM

## 2023-06-09 DIAGNOSIS — Z86.718 PERSONAL HISTORY OF DVT (DEEP VEIN THROMBOSIS): ICD-10-CM

## 2023-06-09 LAB — INR BLD: 3.5 (ref 0.9–1.1)

## 2023-06-09 PROCEDURE — 36416 COLLJ CAPILLARY BLOOD SPEC: CPT

## 2023-06-09 PROCEDURE — 85610 PROTHROMBIN TIME: CPT

## 2023-06-09 NOTE — PROGRESS NOTES
ANTICOAGULATION MANAGEMENT     Renée Mcfadden 57 year old female is on warfarin with supratherapeutic INR result. (Goal INR 2.0-3.0)    Recent labs: (last 7 days)     06/09/23  0818   INR 3.5*       ASSESSMENT       Source(s): Chart Review and Patient/Caregiver Call       Warfarin doses taken: Warfarin taken as instructed    Diet: not eating right. Less protein and greens    Medication/supplement changes: vit D3 started and procardia. Neither should affect INR    New illness, injury, or hospitalization: No    Signs or symptoms of bleeding or clotting: No    Previous result: Therapeutic last 2(+) visits    Additional findings: None         PLAN     Recommended plan for temporary change(s) affecting INR     Dosing Instructions: partial hold then continue your current warfarin dose with next INR in 2 weeks       Summary  As of 6/9/2023    Full warfarin instructions:  6/9: 2.5 mg; Otherwise 7.5 mg every Sun, Tue, Thu; 5 mg all other days   Next INR check:  6/23/2023             Telephone call with Geraldine who verbalizes understanding and agrees to plan    Lab visit scheduled    Education provided:     Please call back if any changes to your diet, medications or how you've been taking warfarin    Dietary considerations: importance of consistent vitamin K intake and Impact of protein intake on INR     Contact [phone]  with any changes, questions or concerns.     Plan made per [facility] anticoagulation protocol    Edilia Dubois RN  Anticoagulation Clinic  6/9/2023    _______________________________________________________________________     Anticoagulation Episode Summary     Current INR goal:  2.0-3.0   TTR:  66.5 % (1 y)   Target end date:  Indefinite   Send INR reminders to:  DOUGIE RAVI    Indications    Personal history of DVT (deep vein thrombosis) [Z86.718]           Comments:           Anticoagulation Care Providers     Provider Role Specialty Phone number    Davidson Alegria MD Referring Family Medicine  [phone]

## 2023-06-23 ENCOUNTER — LAB (OUTPATIENT)
Dept: LAB | Facility: CLINIC | Age: 58
End: 2023-06-23
Payer: COMMERCIAL

## 2023-06-23 ENCOUNTER — ANTICOAGULATION THERAPY VISIT (OUTPATIENT)
Dept: ANTICOAGULATION | Facility: CLINIC | Age: 58
End: 2023-06-23

## 2023-06-23 DIAGNOSIS — Z86.718 PERSONAL HISTORY OF DVT (DEEP VEIN THROMBOSIS): Primary | ICD-10-CM

## 2023-06-23 DIAGNOSIS — Z86.718 PERSONAL HISTORY OF DVT (DEEP VEIN THROMBOSIS): ICD-10-CM

## 2023-06-23 LAB — INR BLD: 3.7 (ref 0.9–1.1)

## 2023-06-23 PROCEDURE — 85610 PROTHROMBIN TIME: CPT

## 2023-06-23 PROCEDURE — 36416 COLLJ CAPILLARY BLOOD SPEC: CPT

## 2023-06-23 NOTE — PROGRESS NOTES
ANTICOAGULATION MANAGEMENT     Renée Mcfadden 57 year old female is on warfarin with supratherapeutic INR result. (Goal INR 2.0-3.0)    Recent labs: (last 7 days)     06/23/23  0831   INR 3.7*       ASSESSMENT       Source(s): Chart Review and Patient/Caregiver Call       Warfarin doses taken: Warfarin taken as instructed    Diet: No new diet changes identified    Medication/supplement changes: None noted    New illness, injury, or hospitalization: No    Signs or symptoms of bleeding or clotting: No    Previous result: Supratherapeutic    Additional findings: None         PLAN     Recommended plan for no diet, medication or health factor changes affecting INR     Dosing Instructions: hold dose then decrease your warfarin dose (5.9% change) with next INR in 2 weeks       Summary  As of 6/23/2023    Full warfarin instructions:  6/23: Hold; Otherwise 7.5 mg every Sun, Thu; 5 mg all other days   Next INR check:  7/7/2023             Telephone call with Geraldine who verbalizes understanding and agrees to plan    Lab visit scheduled    Education provided:     Goal range and lab monitoring: goal range and significance of current result    Plan made per [facility] anticoagulation protocol    Farrah Lopez RN  Anticoagulation Clinic  6/23/2023    _______________________________________________________________________     Anticoagulation Episode Summary     Current INR goal:  2.0-3.0   TTR:  62.7 % (1 y)   Target end date:  Indefinite   Send INR reminders to:  DOUGIE RAVI    Indications    Personal history of DVT (deep vein thrombosis) [Z86.718]           Comments:           Anticoagulation Care Providers     Provider Role Specialty Phone number    Davidson Alegria MD Referring Family Medicine [phone]

## 2023-06-23 NOTE — PROGRESS NOTES
ANTICOAGULATION MANAGEMENT     Renée Mcfadden 57 year old female is on warfarin with supratherapeutic INR result. (Goal INR 2.0-3.0)    Recent labs: (last 7 days)     06/23/23  0831   INR 3.7*       ASSESSMENT       Source(s): Chart Review    Previous INR was Supratherapeutic    Medication, diet, health changes since last INR chart reviewed; none identified             PLAN     Unable to reach [facility] today.    No instructions provided. Unable to leave voicemail.    Follow up required to confirm warfarin dose taken and assess for changes and discuss out of range result     Farrah Lopez RN  Anticoagulation Clinic  6/23/2023

## 2023-07-07 ENCOUNTER — LAB (OUTPATIENT)
Dept: LAB | Facility: CLINIC | Age: 58
End: 2023-07-07
Payer: COMMERCIAL

## 2023-07-07 ENCOUNTER — ANTICOAGULATION THERAPY VISIT (OUTPATIENT)
Dept: ANTICOAGULATION | Facility: CLINIC | Age: 58
End: 2023-07-07

## 2023-07-07 DIAGNOSIS — Z86.718 PERSONAL HISTORY OF DVT (DEEP VEIN THROMBOSIS): ICD-10-CM

## 2023-07-07 DIAGNOSIS — Z86.718 PERSONAL HISTORY OF DVT (DEEP VEIN THROMBOSIS): Primary | ICD-10-CM

## 2023-07-07 LAB — INR BLD: 3.1 (ref 0.9–1.1)

## 2023-07-07 PROCEDURE — 36416 COLLJ CAPILLARY BLOOD SPEC: CPT

## 2023-07-07 PROCEDURE — 85610 PROTHROMBIN TIME: CPT

## 2023-07-07 NOTE — PROGRESS NOTES
ANTICOAGULATION MANAGEMENT     Renée Mcfadden 57 year old female is on warfarin with supratherapeutic INR result. (Goal INR 2.0-3.0)    Recent labs: (last 7 days)     07/07/23  0841   INR 3.1*       ASSESSMENT       Source(s): Chart Review and Patient/Caregiver Call       Warfarin doses taken: Warfarin taken as instructed    Diet: No new diet changes identified    Medication/supplement changes: None noted    New illness, injury, or hospitalization: No    Signs or symptoms of bleeding or clotting: No    Previous result: Supratherapeutic    Additional findings: None         PLAN     Recommended plan for no diet, medication or health factor changes affecting INR     Dosing Instructions: decrease your warfarin dose (6.2% change) with next INR in 1 week       Summary  As of 7/7/2023    Full warfarin instructions:  7.5 mg every Thu; 5 mg all other days   Next INR check:  7/14/2023             Telephone call with Geraldine who verbalizes understanding and agrees to plan    Lab visit scheduled    Education provided:     Please call back if any changes to your diet, medications or how you've been taking warfarin    Contact [phone]  with any changes, questions or concerns.     Plan made per [facility] anticoagulation protocol    Edilia Dubois RN  Anticoagulation Clinic  7/7/2023    _______________________________________________________________________     Anticoagulation Episode Summary     Current INR goal:  2.0-3.0   TTR:  60.3 % (1 y)   Target end date:  Indefinite   Send INR reminders to:  DOUGIE RAVI    Indications    Personal history of DVT (deep vein thrombosis) [Z86.718]           Comments:           Anticoagulation Care Providers     Provider Role Specialty Phone number    Davidson Alegria MD Referring Family Medicine [phone]

## 2023-07-14 ENCOUNTER — ONCOLOGY VISIT (OUTPATIENT)
Dept: ONCOLOGY | Facility: HOSPITAL | Age: 58
End: 2023-07-14
Payer: COMMERCIAL

## 2023-07-14 ENCOUNTER — ANTICOAGULATION THERAPY VISIT (OUTPATIENT)
Dept: ANTICOAGULATION | Facility: CLINIC | Age: 58
End: 2023-07-14

## 2023-07-14 ENCOUNTER — LAB (OUTPATIENT)
Dept: INFUSION THERAPY | Facility: HOSPITAL | Age: 58
End: 2023-07-14
Payer: COMMERCIAL

## 2023-07-14 VITALS
BODY MASS INDEX: 29.23 KG/M2 | WEIGHT: 171.2 LBS | DIASTOLIC BLOOD PRESSURE: 8 MMHG | HEIGHT: 64 IN | HEART RATE: 90 BPM | OXYGEN SATURATION: 99 % | SYSTOLIC BLOOD PRESSURE: 158 MMHG | TEMPERATURE: 98.8 F | RESPIRATION RATE: 20 BRPM

## 2023-07-14 DIAGNOSIS — C50.412 MALIGNANT NEOPLASM OF UPPER-OUTER QUADRANT OF LEFT BREAST IN FEMALE, ESTROGEN RECEPTOR POSITIVE (H): ICD-10-CM

## 2023-07-14 DIAGNOSIS — Z17.0 MALIGNANT NEOPLASM OF UPPER-OUTER QUADRANT OF LEFT BREAST IN FEMALE, ESTROGEN RECEPTOR POSITIVE (H): ICD-10-CM

## 2023-07-14 DIAGNOSIS — Z86.718 PERSONAL HISTORY OF DVT (DEEP VEIN THROMBOSIS): Primary | ICD-10-CM

## 2023-07-14 DIAGNOSIS — I82.4Y9 DEEP VEIN THROMBOSIS (DVT) OF PROXIMAL LOWER EXTREMITY, UNSPECIFIED CHRONICITY, UNSPECIFIED LATERALITY (H): Primary | ICD-10-CM

## 2023-07-14 DIAGNOSIS — C50.412 MALIGNANT NEOPLASM OF UPPER-OUTER QUADRANT OF LEFT BREAST IN FEMALE, ESTROGEN RECEPTOR POSITIVE (H): Primary | ICD-10-CM

## 2023-07-14 DIAGNOSIS — Z17.0 MALIGNANT NEOPLASM OF UPPER-OUTER QUADRANT OF LEFT BREAST IN FEMALE, ESTROGEN RECEPTOR POSITIVE (H): Primary | ICD-10-CM

## 2023-07-14 LAB
ALBUMIN SERPL BCG-MCNC: 4.5 G/DL (ref 3.5–5.2)
ALP SERPL-CCNC: 61 U/L (ref 35–104)
ALT SERPL W P-5'-P-CCNC: 44 U/L (ref 0–50)
ANION GAP SERPL CALCULATED.3IONS-SCNC: 11 MMOL/L (ref 7–15)
AST SERPL W P-5'-P-CCNC: 31 U/L (ref 0–45)
BASOPHILS # BLD AUTO: 0 10E3/UL (ref 0–0.2)
BASOPHILS NFR BLD AUTO: 1 %
BILIRUB SERPL-MCNC: 0.4 MG/DL
BUN SERPL-MCNC: 10.1 MG/DL (ref 6–20)
CALCIUM SERPL-MCNC: 9.2 MG/DL (ref 8.6–10)
CHLORIDE SERPL-SCNC: 102 MMOL/L (ref 98–107)
CREAT SERPL-MCNC: 0.72 MG/DL (ref 0.51–0.95)
DEPRECATED HCO3 PLAS-SCNC: 25 MMOL/L (ref 22–29)
EOSINOPHIL # BLD AUTO: 0.1 10E3/UL (ref 0–0.7)
EOSINOPHIL NFR BLD AUTO: 2 %
ERYTHROCYTE [DISTWIDTH] IN BLOOD BY AUTOMATED COUNT: 13 % (ref 10–15)
GFR SERPL CREATININE-BSD FRML MDRD: >90 ML/MIN/1.73M2
GLUCOSE SERPL-MCNC: 88 MG/DL (ref 70–99)
HCT VFR BLD AUTO: 45.8 % (ref 35–47)
HGB BLD-MCNC: 15.7 G/DL (ref 11.7–15.7)
IMM GRANULOCYTES # BLD: 0 10E3/UL
IMM GRANULOCYTES NFR BLD: 0 %
INR PPP: 2.03 (ref 0.85–1.15)
LYMPHOCYTES # BLD AUTO: 2.4 10E3/UL (ref 0.8–5.3)
LYMPHOCYTES NFR BLD AUTO: 39 %
MCH RBC QN AUTO: 33 PG (ref 26.5–33)
MCHC RBC AUTO-ENTMCNC: 34.3 G/DL (ref 31.5–36.5)
MCV RBC AUTO: 96 FL (ref 78–100)
MONOCYTES # BLD AUTO: 0.5 10E3/UL (ref 0–1.3)
MONOCYTES NFR BLD AUTO: 8 %
NEUTROPHILS # BLD AUTO: 3.1 10E3/UL (ref 1.6–8.3)
NEUTROPHILS NFR BLD AUTO: 50 %
NRBC # BLD AUTO: 0 10E3/UL
NRBC BLD AUTO-RTO: 0 /100
PLATELET # BLD AUTO: 229 10E3/UL (ref 150–450)
POTASSIUM SERPL-SCNC: 4.4 MMOL/L (ref 3.4–5.3)
PROT SERPL-MCNC: 7.5 G/DL (ref 6.4–8.3)
RBC # BLD AUTO: 4.76 10E6/UL (ref 3.8–5.2)
SODIUM SERPL-SCNC: 138 MMOL/L (ref 136–145)
WBC # BLD AUTO: 6.1 10E3/UL (ref 4–11)

## 2023-07-14 PROCEDURE — 85610 PROTHROMBIN TIME: CPT

## 2023-07-14 PROCEDURE — 99213 OFFICE O/P EST LOW 20 MIN: CPT | Performed by: INTERNAL MEDICINE

## 2023-07-14 PROCEDURE — 85025 COMPLETE CBC W/AUTO DIFF WBC: CPT

## 2023-07-14 PROCEDURE — 36415 COLL VENOUS BLD VENIPUNCTURE: CPT

## 2023-07-14 PROCEDURE — 80053 COMPREHEN METABOLIC PANEL: CPT

## 2023-07-14 ASSESSMENT — PAIN SCALES - GENERAL: PAINLEVEL: NO PAIN (0)

## 2023-07-14 NOTE — PROGRESS NOTES
"Oncology Rooming Note    July 14, 2023 1:47 PM   Renée Mcfadden is a 57 year old female who presents for:    Chief Complaint   Patient presents with    Hematology     3 month return Personal history of DVT (deep vein thrombosis), review labs     Oncology Clinic Visit     3 month return Malignant neoplasm of upper-outer quadrant of left breast in female, estrogen receptor positive, review labs, DEXA & Mammogram      Initial Vitals: BP (!) 158/8 (BP Location: Left arm, Patient Position: Sitting, Cuff Size: Adult Regular)   Pulse 90   Temp 98.8  F (37.1  C)   Resp 20   Ht 1.619 m (5' 3.75\")   Wt 77.7 kg (171 lb 3.2 oz)   SpO2 99%   BMI 29.62 kg/m   Estimated body mass index is 29.62 kg/m  as calculated from the following:    Height as of this encounter: 1.619 m (5' 3.75\").    Weight as of this encounter: 77.7 kg (171 lb 3.2 oz). Body surface area is 1.87 meters squared.  No Pain (0) Comment: Data Unavailable   No LMP recorded. Patient is postmenopausal.  Allergies reviewed: Yes  Medications reviewed: Yes    Medications: Medication refills not needed today.  Pharmacy name entered into [employer]: [facility] - ONE [facility] AT PORTAL TO REGISTERED [facility] SITES    Clinical concerns:  3 month return Malignant neoplasm of upper-outer quadrant of left breast in female, estrogen receptor positive, review labs, DEXA & Mammogram  3 month return Personal history of DVT (deep vein thrombosis), review labs       Holly Swann CMA            "

## 2023-07-14 NOTE — LETTER
"    7/14/2023         RE: Renée Mcfadden  [address]  [place] [facility] [place] [address]        Dear Colleague,    Thank you for referring your patient, Renée Mcfadden, to the [facility]. Please see a copy of my visit note below.    Oncology Rooming Note    July 14, 2023 1:47 PM   Renée Mcfadden is a 57 year old female who presents for:    Chief Complaint   Patient presents with     Hematology     3 month return Personal history of DVT (deep vein thrombosis), review labs      Oncology Clinic Visit     3 month return Malignant neoplasm of upper-outer quadrant of left breast in female, estrogen receptor positive, review labs, DEXA & Mammogram      Initial Vitals: BP (!) 158/8 (BP Location: Left arm, Patient Position: Sitting, Cuff Size: Adult Regular)   Pulse 90   Temp 98.8  F (37.1  C)   Resp 20   Ht 1.619 m (5' 3.75\")   Wt 77.7 kg (171 lb 3.2 oz)   SpO2 99%   BMI 29.62 kg/m   Estimated body mass index is 29.62 kg/m  as calculated from the following:    Height as of this encounter: 1.619 m (5' 3.75\").    Weight as of this encounter: 77.7 kg (171 lb 3.2 oz). Body surface area is 1.87 meters squared.  No Pain (0) Comment: Data Unavailable   No LMP recorded. Patient is postmenopausal.  Allergies reviewed: Yes  Medications reviewed: Yes    Medications: Medication refills not needed today.  Pharmacy name entered into [employer]: [facility] - ONE [facility] AT PORTAL TO REGISTERED [facility] SITES    Clinical concerns:  3 month return Malignant neoplasm of upper-outer quadrant of left breast in female, estrogen receptor positive, review labs, DEXA & Mammogram  3 month return Personal history of DVT (deep vein thrombosis), review labs       Holly Swann CMA              Again, thank you for allowing me to participate in the care of your patient.        Sincerely,        Jorge Guevara MD  "

## 2023-07-14 NOTE — PROGRESS NOTES
ANTICOAGULATION MANAGEMENT     Renée Mcfadden 57 year old female is on warfarin with therapeutic INR result. (Goal INR 2.0-3.0)    Recent labs: (last 7 days)     07/14/23  1317   INR 2.03*       ASSESSMENT       Source(s): Chart Review and Patient/Caregiver Call       Warfarin doses taken: Warfarin taken as instructed    Diet: No new diet changes identified    Medication/supplement changes: None noted    New illness, injury, or hospitalization: No    Signs or symptoms of bleeding or clotting: No    Previous result: Supratherapeutic    Additional findings: None         PLAN     Recommended plan for no diet, medication or health factor changes affecting INR     Dosing Instructions: Continue your current warfarin dose with next INR in 2 weeks       Summary  As of 7/14/2023    Full warfarin instructions:  7.5 mg every Thu; 5 mg all other days   Next INR check:  7/28/2023             Telephone call with Geraldine who verbalizes understanding and agrees to plan    Lab visit scheduled    Education provided:     Please call back if any changes to your diet, medications or how you've been taking warfarin    Contact [phone]  with any changes, questions or concerns.     Plan made per [facility] anticoagulation protocol    Edilia Dubois RN  Anticoagulation Clinic  7/14/2023    _______________________________________________________________________     Anticoagulation Episode Summary     Current INR goal:  2.0-3.0   TTR:  61.8 % (1 y)   Target end date:  Indefinite   Send INR reminders to:  DOUGIE RAVI    Indications    Personal history of DVT (deep vein thrombosis) [Z86.718]           Comments:           Anticoagulation Care Providers     Provider Role Specialty Phone number    Davidson Alegria MD Referring Family Medicine [phone]

## 2023-07-17 ENCOUNTER — TELEPHONE (OUTPATIENT)
Dept: FAMILY MEDICINE | Facility: CLINIC | Age: 58
End: 2023-07-17
Payer: COMMERCIAL

## 2023-07-17 NOTE — TELEPHONE ENCOUNTER
Patient Quality Outreach    Patient is due for the following:   Hypertension -  Hypertension follow-up visit  Colon Cancer Screening  Breast Cancer Screening - Mammogram  Cervical Cancer Screening - PAP Needed  Physical Preventive Adult Physical,  - Due after 10/28/23      Topic Date Due     Hepatitis B Vaccine (1 of 3 - 3-dose series) Never done     Zoster (Shingles) Vaccine (1 of 2) Never done     Pneumococcal Vaccine (2 - PCV) 11/15/2020     COVID-19 Vaccine (2 - Booster for Rebecca series) 09/03/2021       Next Steps:   Schedule a office visit for hypertension    Type of outreach:    Sent Team-Match message.      Questions for provider review:    None           Sri Green, CMA

## 2023-07-17 NOTE — LETTER
August 8, 2023    To  Renée Mcfadden  [address] [place] [address]    Your team at [facility] cares about your health. We have reviewed your chart and based on our findings; we are making the following recommendations to better manage your health.     You are in particular need of attention regarding the following:     HYPERTENSION FOLLOW UP: Office Visit  Schedule a primary care office visit with your provider for a Pap Smear to screen for Cervical Cancer.  Call or MyChart message your clinic to schedule a colonoscopy, schedule/ a FIT Test, or order a Cologuard test. If you are unsure what type of test you need, please call your clinic and speak to clinic staff.   Colon cancer is now the second leading cause of cancer-related deaths in the [place] for both men and women and there are over 130,000 new cases and 50,000 deaths per year from colon cancer. Colonoscopies can prevent 90-95% of these deaths. Problem lesions can be removed before they ever become cancer. This test is not only looking for cancer, but also getting rid of precancerous lesions.   If you are under/uninsured, we recommend you contact the [employer]Out Program.[employer]Out is a free colorectal cancer screening program that provides colonoscopies for eligible under/uninsured [place] men and women. If you are interested in receiving a free colonoscopy, please call [employer] at t [phone] (mention code ScopesWeb) to see if you're eligible. Please have them send us the results.   Please schedule a Nurse Only Appointment with your primary care clinic to update your immunizations that are due.  PREVENTATIVE VISIT: Physical due after 10/28/23    If you have already completed these items, please contact the clinic via phone or   Parkmobilehart so your care team can review and update your records. Thank you for   choosing [facility] for your healthcare needs. For any questions,   concerns, or to  schedule an appointment please contact our clinic.    Healthy Regards,      Your  [facility] Care Team

## 2023-07-17 NOTE — LETTER
July 17, 2023    To  Renée Mcfadden  [address] [place] [facility] [address]    Your team at [facility] cares about your health. We have reviewed your chart and based on our findings; we are making the following recommendations to better manage your health.     You are in particular need of attention regarding the following:     HYPERTENSION FOLLOW UP: Office Visit  Schedule Annual MAMMOGRAPHY. [facility] scheduling number is [phone] or schedule in MyChart (self referral).  Schedule a primary care office visit with your provider for a Pap Smear to screen for Cervical Cancer.  Call or MyChart message your clinic to schedule a colonoscopy, schedule/ a FIT Test, or order a Cologuard test. If you are unsure what type of test you need, please call your clinic and speak to clinic staff.   Colon cancer is now the second leading cause of cancer-related deaths in the [facility] for both men and women and there are over 130,000 new cases and 50,000 deaths per year from colon cancer. Colonoscopies can prevent 90-95% of these deaths. Problem lesions can be removed before they ever become cancer. This test is not only looking for cancer, but also getting rid of precancerous lesions.   If you are under/uninsured, we recommend you contact the [employer] Program.[facility] [employer] is a free colorectal cancer screening program that provides colonoscopies for eligible under/uninsured [place] men and women. If you are interested in receiving a free colonoscopy, please call [employer] at t [phone] (mention code [employer]Web) to see if you're eligible. Please have them send us the results.   Please schedule a Nurse Only Appointment with your primary care clinic to update your immunizations that are due.  PREVENTATIVE VISIT: Physical due after 10/28/23    If you have already completed these items, please contact the clinic via phone or   [email or web address] so your care team can review and update your records.  Thank you for   choosing [facility] for your healthcare needs. For any questions,   concerns, or to schedule an appointment please contact our clinic.    Healthy Regards,      Your [facility] Care Team

## 2023-07-24 DIAGNOSIS — C50.412 MALIGNANT NEOPLASM OF UPPER-OUTER QUADRANT OF LEFT BREAST IN FEMALE, ESTROGEN RECEPTOR POSITIVE (H): ICD-10-CM

## 2023-07-24 DIAGNOSIS — Z17.0 MALIGNANT NEOPLASM OF UPPER-OUTER QUADRANT OF LEFT BREAST IN FEMALE, ESTROGEN RECEPTOR POSITIVE (H): ICD-10-CM

## 2023-07-25 RX ORDER — LETROZOLE 2.5 MG/1
2.5 TABLET, FILM COATED ORAL DAILY
Qty: 60 TABLET | Refills: 1 | Status: SHIPPED | OUTPATIENT
Start: 2023-07-25 | End: 2023-12-06

## 2023-08-04 ENCOUNTER — ANTICOAGULATION THERAPY VISIT (OUTPATIENT)
Dept: ANTICOAGULATION | Facility: CLINIC | Age: 58
End: 2023-08-04

## 2023-08-04 ENCOUNTER — LAB (OUTPATIENT)
Dept: LAB | Facility: CLINIC | Age: 58
End: 2023-08-04
Payer: COMMERCIAL

## 2023-08-04 DIAGNOSIS — Z86.718 PERSONAL HISTORY OF DVT (DEEP VEIN THROMBOSIS): ICD-10-CM

## 2023-08-04 DIAGNOSIS — Z86.718 PERSONAL HISTORY OF DVT (DEEP VEIN THROMBOSIS): Primary | ICD-10-CM

## 2023-08-04 LAB — INR BLD: 1.5 (ref 0.9–1.1)

## 2023-08-04 PROCEDURE — 36416 COLLJ CAPILLARY BLOOD SPEC: CPT

## 2023-08-04 PROCEDURE — 85610 PROTHROMBIN TIME: CPT

## 2023-08-04 NOTE — PROGRESS NOTES
ANTICOAGULATION MANAGEMENT     Renée Mcfadden 57 year old female is on warfarin with subtherapeutic INR result. (Goal INR 2.0-3.0)    Recent labs: (last 7 days)     08/04/23  0822   INR 1.5*       ASSESSMENT     Source(s): Chart Review and Patient/Caregiver Call     Warfarin doses taken: Missed dose(s) may be affecting INR. Patient unsure what day but thought it was last week.  Diet: No new diet changes identified  Medication/supplement changes: None noted, started citracal but should not interact  New illness, injury, or hospitalization: No  Signs or symptoms of bleeding or clotting: No  Previous result: Therapeutic last visit; previously outside of goal range  Additional findings: None       PLAN     Recommended plan for temporary change(s) affecting INR     Dosing Instructions: booster dose then continue your current warfarin dose with next INR in 2 weeks       Summary  As of 8/4/2023      Full warfarin instructions:  8/4: 10 mg; Otherwise 7.5 mg every Thu; 5 mg all other days   Next INR check:  8/18/2023               Telephone call with Geraldine who verbalizes understanding and agrees to plan    Lab visit scheduled    Education provided:   Please call back if any changes to your diet, medications or how you've been taking warfarin  Taking warfarin: Importance of taking warfarin as instructed  Contact [phone]  with any changes, questions or concerns.     Plan made per [facility] anticoagulation protocol    Edilia Dubois RN  Anticoagulation Clinic  8/4/2023    _______________________________________________________________________     Anticoagulation Episode Summary       Current INR goal:  2.0-3.0   TTR:  58.2 % (1 y)   Target end date:  Indefinite   Send INR reminders to:  DOUGIE RAVI    Indications    Personal history of DVT (deep vein thrombosis) [Z86.718]             Comments:               Anticoagulation Care Providers       Provider Role Specialty Phone number    Davidson Alegria MD Referring  Family Medicine [phone]

## 2023-08-08 NOTE — TELEPHONE ENCOUNTER
Patient Quality Outreach    Patient is due for the following:   Hypertension -  Hypertension follow-up visit  Colon Cancer Screening  Breast Cancer Screening - Mammogram  Cervical Cancer Screening - PAP Needed  Physical Preventive Adult Physical,  - Due after 10/28/23      Topic Date Due    Hepatitis B Vaccine (1 of 3 - 3-dose series) Never done    Zoster (Shingles) Vaccine (1 of 2) Never done    Pneumococcal Vaccine (2 - PCV) 11/15/2020    COVID-19 Vaccine (2 - Booster for Rebecca series) 09/03/2021       Next Steps:   Schedule a office visit for hypertension Physical due after 10/28/23    Type of outreach:    Sent letter.    Next Steps:  Reach out within 90 days via [employer].    Max number of attempts reached: Yes. Will try again in 90 days if patient still on fail list.    Questions for provider review:    None           Sri Green CMA  Chart routed to Care Team.

## 2023-08-11 ENCOUNTER — ANTICOAGULATION THERAPY VISIT (OUTPATIENT)
Dept: ANTICOAGULATION | Facility: CLINIC | Age: 58
End: 2023-08-11

## 2023-08-11 ENCOUNTER — LAB (OUTPATIENT)
Dept: LAB | Facility: CLINIC | Age: 58
End: 2023-08-11
Payer: COMMERCIAL

## 2023-08-11 DIAGNOSIS — Z86.718 PERSONAL HISTORY OF DVT (DEEP VEIN THROMBOSIS): Primary | ICD-10-CM

## 2023-08-11 DIAGNOSIS — Z86.718 PERSONAL HISTORY OF DVT (DEEP VEIN THROMBOSIS): ICD-10-CM

## 2023-08-11 LAB — INR BLD: 2.3 (ref 0.9–1.1)

## 2023-08-11 PROCEDURE — 36415 COLL VENOUS BLD VENIPUNCTURE: CPT

## 2023-08-11 PROCEDURE — 85610 PROTHROMBIN TIME: CPT

## 2023-08-11 NOTE — PROGRESS NOTES
ANTICOAGULATION MANAGEMENT     Renée Mcfadden 57 year old female is on warfarin with therapeutic INR result. (Goal INR 2.0-3.0)    Recent labs: (last 7 days)     08/11/23  1443   INR 2.3*       ASSESSMENT     Source(s): Chart Review and Patient/Caregiver Call     Warfarin doses taken: Warfarin taken as instructed  Diet: No new diet changes identified  Medication/supplement changes: None noted  New illness, injury, or hospitalization: No  Signs or symptoms of bleeding or clotting: No  Previous result: Subtherapeutic  Additional findings: None       PLAN     Recommended plan for no diet, medication or health factor changes affecting INR     Dosing Instructions: Continue your current warfarin dose with next INR in 2 weeks       Summary  As of 8/11/2023      Full warfarin instructions:  7.5 mg every Thu; 5 mg all other days   Next INR check:  9/1/2023               Telephone call with Geraldine who verbalizes understanding and agrees to plan    Patient elected to schedule next visit in 3 weeks    Education provided:   Goal range and lab monitoring: goal range and significance of current result    Plan made per [facility] anticoagulation protocol    Farrah Lopez RN  Anticoagulation Clinic  8/11/2023    _______________________________________________________________________     Anticoagulation Episode Summary       Current INR goal:  2.0-3.0   TTR:  58.9 % (1 y)   Target end date:  Indefinite   Send INR reminders to:  DOUGIE RAVI    Indications    Personal history of DVT (deep vein thrombosis) [Z86.718]             Comments:               Anticoagulation Care Providers       Provider Role Specialty Phone number    Davidson Alegria MD Referring Family Medicine [phone]

## 2023-08-11 NOTE — PROGRESS NOTES
[facility] Hematology and Oncology Progress Note    Patient: Renée Mcfadden  MRN: [id number]  Date of Service: 04/07/2023        Reason for Visit    Chief Complaint   Patient presents with    Hematology     3 month return Personal history of DVT (deep vein thrombosis), review labs     Oncology Clinic Visit     3 month return Malignant neoplasm of upper-outer quadrant of left breast in female, estrogen receptor positive, review labs, DEXA & Mammogram          Problem List Items Addressed This Visit    None          Assessment and Plan  Early stage left-sided breast cancer hormone receptor positive and HER2 negative  Status postlumpectomy, pT1b, pN 0, cM0  Oncotype DX score 20  Adjuvant radiation was not considered  Doing well on letrozole.  No significant side effects.  Some menopausal symptoms but otherwise doing well on it.  Physical exam is unremarkable today.  Lumpectomy scar is healing well.  Reviewed today.  Normal serum chemistry and normal CBC.  Continue letrozole.  Will see her back in 3 months.    Strongly advised smoking cessation.     Cancer Staging   No matching staging information was found for the patient.    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    Deep vein thrombosis (DVT) of proximal lower extremity, unspecified chronicity, unspecified laterality (H)    Personal history of DVT (deep vein thrombosis)    Anticardiolipin antibody positive    Mild aortic stenosis    Mild mitral stenosis    Malignant neoplasm of upper-outer quadrant of left breast in female, estrogen receptor positive (H)        Oncology history  Screening mammogram showed a focal asymmetry in the left upper breast at posterior depth. Ultrasound showed a 6 x 4 x 5 mm hypoechoic mass with microlobulated margins at 2 o'clock position the left  breast about 8 cm from the nipple. Needle biopsy showed invasive ductal carcinoma, grade 1, with background of DCIS, strongly ER positive, [place] negative and HER2 negative by IHC. No evidence of abnormal lymph nodes in the left axilla.     Underwent lumpectomy on 2/15/2023    The tumor measured 10 x 7 x 5 mm in size.  1 sentinel lymph node was removed which was negative for malignancy.  All the margins were negative.  pT1b, pN0, cM0.  Oncotype DX score came back at 20 which predicts no real benefit from adjuvant chemotherapy.      With a history of limited scleroderma and due to the fact that she had wide surgical margins, adjuvant radiation was not considered.    Started adjuvant endocrine therapy with letrozole from April 2023.    Interval History   Renée Mcfadden is a 57 year old female with a recent history of early-stage left-sided breast cancer status postlumpectomy who is currently on letrozole for adjuvant endocrine therapy here for follow-up.    She started letrozole 3 months ago.  Doing relatively well on that.  No significant side effects.  No new lumps reported.  Denies any bone pain.  No weight loss.  Trying to quit smoking.        Review of Systems  A comprehensive review of systems was negative except for what is noted in the interval history    Current Outpatient Medications   Medication    NIFEdipine ER OSMOTIC (PROCARDIA XL) 60 MG 24 hr tablet    triamcinolone (KENALOG) 0.1 % external ointment    warfarin ANTICOAGULANT (JANTOVEN ANTICOAGULANT) 5 MG tablet    HYDROcodone-acetaminophen (NORCO) 5-325 MG tablet    letrozole (FEMARA) 2.5 MG tablet     No current facility-administered medications for this visit.        Physical Exam        General: alert and cooperative  HEENT: Head: Normal, normocephalic, atraumatic.  Eye: Normal external eye, conjunctiva, lids cornea, CASSIUS.  Breast: Lumpectomy scar noted in the left breast.  Healing well.  No new masses.  No evidence of bilateral axillary  adenopathy  CNS: Alert and oriented x3, neurologic exam grossly normal.      Lab Results    Recent Results (from the past 168 hour(s))   INR point of care   Result Value Ref Range    INR 1.5 (H) 0.9 - 1.1       Imaging    No results found.      Signed by: Jorge Guevara MD

## 2023-09-01 ENCOUNTER — ANTICOAGULATION THERAPY VISIT (OUTPATIENT)
Dept: ANTICOAGULATION | Facility: CLINIC | Age: 58
End: 2023-09-01

## 2023-09-01 ENCOUNTER — LAB (OUTPATIENT)
Dept: LAB | Facility: CLINIC | Age: 58
End: 2023-09-01
Payer: COMMERCIAL

## 2023-09-01 DIAGNOSIS — Z86.718 PERSONAL HISTORY OF DVT (DEEP VEIN THROMBOSIS): Primary | ICD-10-CM

## 2023-09-01 DIAGNOSIS — Z86.718 PERSONAL HISTORY OF DVT (DEEP VEIN THROMBOSIS): ICD-10-CM

## 2023-09-01 LAB — INR BLD: 2.3 (ref 0.9–1.1)

## 2023-09-01 PROCEDURE — 85610 PROTHROMBIN TIME: CPT

## 2023-09-01 PROCEDURE — 36415 COLL VENOUS BLD VENIPUNCTURE: CPT

## 2023-09-01 NOTE — PROGRESS NOTES
ANTICOAGULATION MANAGEMENT     Renée Mcfadden 57 year old female is on warfarin with therapeutic INR result. (Goal INR 2.0-3.0)    Recent labs: (last 7 days)     09/01/23  0919   INR 2.3*       ASSESSMENT     Source(s): Chart Review     Warfarin doses taken: Warfarin taken as instructed  Diet: No new diet changes identified  Medication/supplement changes: None noted  New illness, injury, or hospitalization: No  Signs or symptoms of bleeding or clotting: No  Previous result: Therapeutic last visit; previously outside of goal range  Additional findings: None       PLAN     Recommended plan for no diet, medication or health factor changes affecting INR     Dosing Instructions: Continue your current warfarin dose with next INR in 4 weeks       Summary  As of 9/1/2023      Full warfarin instructions:  7.5 mg every Thu; 5 mg all other days   Next INR check:  9/29/2023               Telephone call with Geraldine who verbalizes understanding and agrees to plan    Lab visit scheduled    Education provided:   Please call back if any changes to your diet, medications or how you've been taking warfarin  Contact [phone]  with any changes, questions or concerns.     Plan made per [facility] anticoagulation protocol    Edilia Dubois RN  Anticoagulation Clinic  9/1/2023    _______________________________________________________________________     Anticoagulation Episode Summary       Current INR goal:  2.0-3.0   TTR:  63.5 % (1 y)   Target end date:  Indefinite   Send INR reminders to:  DOUGIE RAVI    Indications    Personal history of DVT (deep vein thrombosis) [Z86.718]             Comments:               Anticoagulation Care Providers       Provider Role Specialty Phone number    Davidson Alegria MD Referring Family Medicine [phone]

## 2023-09-28 ENCOUNTER — PATIENT OUTREACH (OUTPATIENT)
Dept: CARE COORDINATION | Facility: CLINIC | Age: 58
End: 2023-09-28
Payer: COMMERCIAL

## 2023-09-29 ENCOUNTER — LAB (OUTPATIENT)
Dept: LAB | Facility: CLINIC | Age: 58
End: 2023-09-29
Payer: COMMERCIAL

## 2023-09-29 ENCOUNTER — ANTICOAGULATION THERAPY VISIT (OUTPATIENT)
Dept: ANTICOAGULATION | Facility: CLINIC | Age: 58
End: 2023-09-29

## 2023-09-29 DIAGNOSIS — Z86.718 PERSONAL HISTORY OF DVT (DEEP VEIN THROMBOSIS): ICD-10-CM

## 2023-09-29 DIAGNOSIS — Z86.718 PERSONAL HISTORY OF DVT (DEEP VEIN THROMBOSIS): Primary | ICD-10-CM

## 2023-09-29 LAB — INR BLD: 1.7 (ref 0.9–1.1)

## 2023-09-29 PROCEDURE — 85610 PROTHROMBIN TIME: CPT

## 2023-09-29 PROCEDURE — 36416 COLLJ CAPILLARY BLOOD SPEC: CPT

## 2023-09-29 NOTE — PROGRESS NOTES
ANTICOAGULATION MANAGEMENT     Renée Mcfadden 58 year old female is on warfarin with subtherapeutic INR result. (Goal INR 2.0-3.0)    Recent labs: (last 7 days)     09/29/23  0843   INR 1.7*       ASSESSMENT     Source(s): Chart Review and Patient/Caregiver Call     Warfarin doses taken: Missed dose(s) may be affecting INR  Diet: No new diet changes identified  Medication/supplement changes: None noted  New illness, injury, or hospitalization: No  Signs or symptoms of bleeding or clotting: No  Previous result: Therapeutic last 2(+) visits  Additional findings: None       PLAN     Recommended plan for temporary change(s) affecting INR     Dosing Instructions: booster dose then continue your current warfarin dose with next INR in 2 weeks       Summary  As of 9/29/2023      Full warfarin instructions:  9/29: 7.5 mg; Otherwise 7.5 mg every Thu; 5 mg all other days   Next INR check:  10/13/2023               Telephone call with Geradline who verbalizes understanding and agrees to plan    Lab visit scheduled    Education provided:   Goal range and lab monitoring: goal range and significance of current result    Plan made per [facility] anticoagulation protocol    Farrah Lopez RN  Anticoagulation Clinic  9/29/2023    _______________________________________________________________________     Anticoagulation Episode Summary       Current INR goal:  2.0-3.0   TTR:  59.7 % (1 y)   Target end date:  Indefinite   Send INR reminders to:  DOUGIE RAVI    Indications    Personal history of DVT (deep vein thrombosis) [Z86.718]             Comments:               Anticoagulation Care Providers       Provider Role Specialty Phone number    Davidson Alegria MD Referring Family Medicine [phone]

## 2023-09-29 NOTE — PROGRESS NOTES
ANTICOAGULATION MANAGEMENT     Renée Mcfadden 58 year old female is on warfarin with subtherapeutic INR result. (Goal INR 2.0-3.0)    Recent labs: (last 7 days)     09/29/23  0843   INR 1.7*       ASSESSMENT     Source(s): Chart Review  Previous INR was Therapeutic last 2(+) visits  Medication, diet, health changes since last INR chart reviewed; none identified         PLAN     Unable to reach [facility] today.    No instructions provided. Unable to leave voicemail.    Follow up required to confirm warfarin dose taken and assess for changes and discuss out of range result     Farrah Lopez RN  Anticoagulation Clinic  9/29/2023

## 2023-10-12 ENCOUNTER — PATIENT OUTREACH (OUTPATIENT)
Dept: CARE COORDINATION | Facility: CLINIC | Age: 58
End: 2023-10-12
Payer: COMMERCIAL

## 2023-10-13 ENCOUNTER — LAB (OUTPATIENT)
Dept: LAB | Facility: CLINIC | Age: 58
End: 2023-10-13
Payer: COMMERCIAL

## 2023-10-13 ENCOUNTER — ANTICOAGULATION THERAPY VISIT (OUTPATIENT)
Dept: ANTICOAGULATION | Facility: CLINIC | Age: 58
End: 2023-10-13

## 2023-10-13 DIAGNOSIS — Z86.718 PERSONAL HISTORY OF DVT (DEEP VEIN THROMBOSIS): Primary | ICD-10-CM

## 2023-10-13 DIAGNOSIS — Z86.718 PERSONAL HISTORY OF DVT (DEEP VEIN THROMBOSIS): ICD-10-CM

## 2023-10-13 LAB — INR BLD: 2 (ref 0.9–1.1)

## 2023-10-13 PROCEDURE — 85610 PROTHROMBIN TIME: CPT

## 2023-10-13 PROCEDURE — 36415 COLL VENOUS BLD VENIPUNCTURE: CPT

## 2023-10-13 NOTE — PROGRESS NOTES
ANTICOAGULATION MANAGEMENT     Renée Mcfadden 58 year old female is on warfarin with therapeutic INR result. (Goal INR 2.0-3.0)    Recent labs: (last 7 days)     10/13/23  0816   INR 2.0*       ASSESSMENT     Source(s): Chart Review and Patient/Caregiver Call     Warfarin doses taken: Warfarin taken as instructed  Diet: No new diet changes identified  Medication/supplement changes: None noted  New illness, injury, or hospitalization: No  Signs or symptoms of bleeding or clotting: No  Previous result: Subtherapeutic  Additional findings: None     PLAN     Recommended plan for no diet, medication or health factor changes affecting INR     Dosing Instructions: Continue your current warfarin dose with next INR in 3 weeks       Summary  As of 10/13/2023      Full warfarin instructions:  7.5 mg every Thu; 5 mg all other days   Next INR check:  11/3/2023               Telephone call with Geraldine who verbalizes understanding and agrees to plan    Lab visit scheduled    Education provided:   Please call back if any changes to your diet, medications or how you've been taking warfarin    Plan made per [facility] anticoagulation protocol    Ethel Adams RN  Anticoagulation Clinic  10/13/2023    _______________________________________________________________________     Anticoagulation Episode Summary       Current INR goal:  2.0-3.0   TTR:  55.9% (1 y)   Target end date:  Indefinite   Send INR reminders to:  DOUGIE RAVI    Indications    Personal history of DVT (deep vein thrombosis) [Z86.498]             Comments:               Anticoagulation Care Providers       Provider Role Specialty Phone number    Davidson Alegria MD Referring Family Medicine [phone]

## 2023-11-03 ENCOUNTER — ANTICOAGULATION THERAPY VISIT (OUTPATIENT)
Dept: ANTICOAGULATION | Facility: CLINIC | Age: 58
End: 2023-11-03

## 2023-11-03 ENCOUNTER — LAB (OUTPATIENT)
Dept: LAB | Facility: CLINIC | Age: 58
End: 2023-11-03
Payer: COMMERCIAL

## 2023-11-03 ENCOUNTER — DOCUMENTATION ONLY (OUTPATIENT)
Dept: ANTICOAGULATION | Facility: CLINIC | Age: 58
End: 2023-11-03

## 2023-11-03 ENCOUNTER — TELEPHONE (OUTPATIENT)
Dept: FAMILY MEDICINE | Facility: CLINIC | Age: 58
End: 2023-11-03

## 2023-11-03 DIAGNOSIS — Z86.718 PERSONAL HISTORY OF DVT (DEEP VEIN THROMBOSIS): Primary | ICD-10-CM

## 2023-11-03 DIAGNOSIS — Z86.718 PERSONAL HISTORY OF DVT (DEEP VEIN THROMBOSIS): ICD-10-CM

## 2023-11-03 LAB — INR BLD: 2.2 (ref 0.9–1.1)

## 2023-11-03 PROCEDURE — 85610 PROTHROMBIN TIME: CPT

## 2023-11-03 PROCEDURE — 36416 COLLJ CAPILLARY BLOOD SPEC: CPT

## 2023-11-03 NOTE — TELEPHONE ENCOUNTER
Reason for Call:  INR follow up    Detailed comments: Patient is retuning call to nurse from today, please call again    Phone Number Patient can be reached at: Home number on file [phone] (home)    Best Time: any    Can we leave a detailed message on this number? No      Call taken on 11/3/2023 at 3:46 PM by Samantha Garber

## 2023-11-03 NOTE — PROGRESS NOTES
ANTICOAGULATION CLINIC REFERRAL RENEWAL REQUEST       An annual renewal order is required for all patients referred to [facility].?  Please review and sign the pended referral order for Renée Mcfadden.       ANTICOAGULATION SUMMARY      Warfarin indication(s)   DVT    Mechanical heart valve present?  NO       Current goal range   INR: 2.0-3.0     Goal appropriate for indication? Goal INR 2-3, standard for indication(s) above     Time in Therapeutic Range (TTR)  (Goal > 60%) 55.9%       Office visit with referring provider's group within last year yes on 2-9-2023       Edilia Dubois RN  [facility]tico[facility]

## 2023-11-03 NOTE — PROGRESS NOTES
ANTICOAGULATION MANAGEMENT     Renée Mcfadden 58 year old female is on warfarin with therapeutic INR result. (Goal INR 2.0-3.0)    Recent labs: (last 7 days)     11/03/23  0844   INR 2.2*       ASSESSMENT     Source(s): Chart Review  Previous INR was Therapeutic last visit; previously outside of goal range  Medication, diet, health changes since last INR chart reviewed; none identified         PLAN     Recommended plan for no diet, medication or health factor changes affecting INR     Dosing Instructions: Continue your current warfarin dose with next INR in 4 weeks       Summary  As of 11/3/2023      Full warfarin instructions:  7.5 mg every Thu; 5 mg all other days   Next INR check:  12/1/2023               Sent [email or web address] message with dosing and follow up instructions  VM left to check MCM or call back    Contact [phone]  to schedule and with any changes, questions or concerns.     Education provided:   Please call back if any changes to your diet, medications or how you've been taking warfarin  Contact [phone]  with any changes, questions or concerns.     Plan made per [facility] anticoagulation protocol    Edilia Dubois RN  Anticoagulation Clinic  11/3/2023    _______________________________________________________________________     Anticoagulation Episode Summary       Current INR goal:  2.0-3.0   TTR:  57.9% (1 y)   Target end date:  Indefinite   Send INR reminders to:  DOUGIE RAVI    Indications    Personal history of DVT (deep vein thrombosis) [Z86.718]             Comments:               Anticoagulation Care Providers       Provider Role Specialty Phone number    Davidson Alegria MD Referring Family Medicine [phone]

## 2023-11-21 DIAGNOSIS — C50.412 MALIGNANT NEOPLASM OF UPPER-OUTER QUADRANT OF LEFT BREAST IN FEMALE, ESTROGEN RECEPTOR POSITIVE (H): Primary | ICD-10-CM

## 2023-11-21 DIAGNOSIS — Z17.0 MALIGNANT NEOPLASM OF UPPER-OUTER QUADRANT OF LEFT BREAST IN FEMALE, ESTROGEN RECEPTOR POSITIVE (H): Primary | ICD-10-CM

## 2023-11-21 DIAGNOSIS — R76.0 ANTICARDIOLIPIN ANTIBODY POSITIVE: ICD-10-CM

## 2023-11-24 ENCOUNTER — ONCOLOGY VISIT (OUTPATIENT)
Dept: ONCOLOGY | Facility: HOSPITAL | Age: 58
End: 2023-11-24
Attending: INTERNAL MEDICINE
Payer: COMMERCIAL

## 2023-11-24 ENCOUNTER — LAB (OUTPATIENT)
Dept: INFUSION THERAPY | Facility: HOSPITAL | Age: 58
End: 2023-11-24
Attending: INTERNAL MEDICINE
Payer: COMMERCIAL

## 2023-11-24 VITALS
HEART RATE: 85 BPM | BODY MASS INDEX: 29.08 KG/M2 | TEMPERATURE: 98.3 F | OXYGEN SATURATION: 95 % | SYSTOLIC BLOOD PRESSURE: 141 MMHG | WEIGHT: 170.3 LBS | HEIGHT: 64 IN | DIASTOLIC BLOOD PRESSURE: 74 MMHG

## 2023-11-24 DIAGNOSIS — C50.412 MALIGNANT NEOPLASM OF UPPER-OUTER QUADRANT OF LEFT BREAST IN FEMALE, ESTROGEN RECEPTOR POSITIVE (H): ICD-10-CM

## 2023-11-24 DIAGNOSIS — Z17.0 MALIGNANT NEOPLASM OF UPPER-OUTER QUADRANT OF LEFT BREAST IN FEMALE, ESTROGEN RECEPTOR POSITIVE (H): Primary | ICD-10-CM

## 2023-11-24 DIAGNOSIS — Z17.0 MALIGNANT NEOPLASM OF UPPER-OUTER QUADRANT OF LEFT BREAST IN FEMALE, ESTROGEN RECEPTOR POSITIVE (H): ICD-10-CM

## 2023-11-24 DIAGNOSIS — Z12.31 ENCOUNTER FOR SCREENING MAMMOGRAM FOR BREAST CANCER: Primary | ICD-10-CM

## 2023-11-24 DIAGNOSIS — R76.0 ANTICARDIOLIPIN ANTIBODY POSITIVE: ICD-10-CM

## 2023-11-24 DIAGNOSIS — C50.412 MALIGNANT NEOPLASM OF UPPER-OUTER QUADRANT OF LEFT BREAST IN FEMALE, ESTROGEN RECEPTOR POSITIVE (H): Primary | ICD-10-CM

## 2023-11-24 LAB
ALBUMIN SERPL BCG-MCNC: 4.4 G/DL (ref 3.5–5.2)
ALP SERPL-CCNC: 61 U/L (ref 40–150)
ALT SERPL W P-5'-P-CCNC: 43 U/L (ref 0–50)
ANION GAP SERPL CALCULATED.3IONS-SCNC: 11 MMOL/L (ref 7–15)
AST SERPL W P-5'-P-CCNC: 30 U/L (ref 0–45)
BASOPHILS # BLD AUTO: 0 10E3/UL (ref 0–0.2)
BASOPHILS NFR BLD AUTO: 1 %
BILIRUB SERPL-MCNC: 0.4 MG/DL
BUN SERPL-MCNC: 8.5 MG/DL (ref 8–23)
CALCIUM SERPL-MCNC: 9.3 MG/DL (ref 8.6–10)
CHLORIDE SERPL-SCNC: 100 MMOL/L (ref 98–107)
CREAT SERPL-MCNC: 0.7 MG/DL (ref 0.51–0.95)
DEPRECATED HCO3 PLAS-SCNC: 26 MMOL/L (ref 22–29)
EGFRCR SERPLBLD CKD-EPI 2021: >90 ML/MIN/1.73M2
EOSINOPHIL # BLD AUTO: 0.1 10E3/UL (ref 0–0.7)
EOSINOPHIL NFR BLD AUTO: 3 %
ERYTHROCYTE [DISTWIDTH] IN BLOOD BY AUTOMATED COUNT: 12.9 % (ref 10–15)
GLUCOSE SERPL-MCNC: 88 MG/DL (ref 70–99)
HCT VFR BLD AUTO: 44.3 % (ref 35–47)
HGB BLD-MCNC: 14.8 G/DL (ref 11.7–15.7)
IMM GRANULOCYTES # BLD: 0 10E3/UL
IMM GRANULOCYTES NFR BLD: 0 %
LYMPHOCYTES # BLD AUTO: 2.1 10E3/UL (ref 0.8–5.3)
LYMPHOCYTES NFR BLD AUTO: 37 %
MCH RBC QN AUTO: 32.7 PG (ref 26.5–33)
MCHC RBC AUTO-ENTMCNC: 33.4 G/DL (ref 31.5–36.5)
MCV RBC AUTO: 98 FL (ref 78–100)
MONOCYTES # BLD AUTO: 0.4 10E3/UL (ref 0–1.3)
MONOCYTES NFR BLD AUTO: 8 %
NEUTROPHILS # BLD AUTO: 2.9 10E3/UL (ref 1.6–8.3)
NEUTROPHILS NFR BLD AUTO: 51 %
NRBC # BLD AUTO: 0 10E3/UL
NRBC BLD AUTO-RTO: 0 /100
PLATELET # BLD AUTO: 220 10E3/UL (ref 150–450)
POTASSIUM SERPL-SCNC: 4.1 MMOL/L (ref 3.4–5.3)
PROT SERPL-MCNC: 7.1 G/DL (ref 6.4–8.3)
RBC # BLD AUTO: 4.52 10E6/UL (ref 3.8–5.2)
SODIUM SERPL-SCNC: 137 MMOL/L (ref 135–145)
WBC # BLD AUTO: 5.6 10E3/UL (ref 4–11)

## 2023-11-24 PROCEDURE — 85025 COMPLETE CBC W/AUTO DIFF WBC: CPT

## 2023-11-24 PROCEDURE — 99214 OFFICE O/P EST MOD 30 MIN: CPT | Performed by: INTERNAL MEDICINE

## 2023-11-24 PROCEDURE — 36415 COLL VENOUS BLD VENIPUNCTURE: CPT

## 2023-11-24 PROCEDURE — 80053 COMPREHEN METABOLIC PANEL: CPT

## 2023-11-24 PROCEDURE — 99213 OFFICE O/P EST LOW 20 MIN: CPT | Performed by: INTERNAL MEDICINE

## 2023-11-24 ASSESSMENT — PAIN SCALES - GENERAL: PAINLEVEL: NO PAIN (0)

## 2023-11-24 NOTE — Clinical Note
"    11/24/2023         RE: Renée Mcfadden  [address]  [place] [address]        Dear Colleague,    Thank you for referring your patient, Renée Mcfadden, to the [facility]. Please see a copy of my visit note below.    Oncology Rooming Note    November 24, 2023 2:59 PM   Renée Mcfadden is a 58 year old female who presents for:    Chief Complaint   Patient presents with    Oncology Clinic Visit     MaliMalignant neoplasm of upper-outer quadrant of left breast in female, estrogen receptor positivegnant neoplasm of upper-outer quadrant of left breast in female, estrogen receptor positive, review labs      Initial Vitals: BP (!) 141/74 (BP Location: Left arm, Patient Position: Sitting, Cuff Size: Adult Large)   Pulse 85   Temp 98.3  F (36.8  C) (Oral)   Ht 1.619 m (5' 3.74\")   Wt 77.2 kg (170 lb 4.8 oz)   SpO2 95%   BMI 29.47 kg/m   Estimated body mass index is 29.47 kg/m  as calculated from the following:    Height as of this encounter: 1.619 m (5' 3.74\").    Weight as of this encounter: 77.2 kg (170 lb 4.8 oz). Body surface area is 1.86 meters squared.  No Pain (0) Comment: Data Unavailable   No LMP recorded. Patient is postmenopausal.  Allergies reviewed: Yes  Medications reviewed: Yes    Medications: Medication refills not needed today.  Pharmacy name entered into [employer]: [facility] - [facility] AT PORTAL TO REGISTERED [facility] SITES    Clinical concerns:  Malignant neoplasm of upper-outer quadrant of left breast in female, estrogen receptor positive       Holly Swann CMA                Again, thank you for allowing me to participate in the care of your patient.        Sincerely,        Jorge Guevara MD  "

## 2023-11-24 NOTE — PROGRESS NOTES
"Oncology Rooming Note    November 24, 2023 2:59 PM   Renée Mcfadden is a 58 year old female who presents for:    Chief Complaint   Patient presents with    Oncology Clinic Visit     MaliMalignant neoplasm of upper-outer quadrant of left breast in female, estrogen receptor positivegnant neoplasm of upper-outer quadrant of left breast in female, estrogen receptor positive, review labs      Initial Vitals: BP (!) 141/74 (BP Location: Left arm, Patient Position: Sitting, Cuff Size: Adult Large)   Pulse 85   Temp 98.3  F (36.8  C) (Oral)   Ht 1.619 m (5' 3.74\")   Wt 77.2 kg (170 lb 4.8 oz)   SpO2 95%   BMI 29.47 kg/m   Estimated body mass index is 29.47 kg/m  as calculated from the following:    Height as of this encounter: 1.619 m (5' 3.74\").    Weight as of this encounter: 77.2 kg (170 lb 4.8 oz). Body surface area is 1.86 meters squared.  No Pain (0) Comment: Data Unavailable   No LMP recorded. Patient is postmenopausal.  Allergies reviewed: Yes  Medications reviewed: Yes    Medications: Medication refills not needed today.  Pharm[facility] into [employer] - ONE [facility] AT PORTAL TO REGISTERED [employer] SITES    Clinical concerns:  Malignant neoplasm of upper-outer quadrant of left breast in female, estrogen receptor positive       Holly Swann CMA            "

## 2023-11-28 ASSESSMENT — ENCOUNTER SYMPTOMS
CONSTIPATION: 0
HEMATOCHEZIA: 0
SHORTNESS OF BREATH: 0
DYSURIA: 0
JOINT SWELLING: 0
DIARRHEA: 0
CHILLS: 1
WEAKNESS: 1
COUGH: 0
BREAST MASS: 0
NERVOUS/ANXIOUS: 1
HEMATURIA: 0
ABDOMINAL PAIN: 0
ARTHRALGIAS: 1
EYE PAIN: 0
FEVER: 0
HEADACHES: 0
HEARTBURN: 0
SORE THROAT: 0
NAUSEA: 0
MYALGIAS: 1
DIZZINESS: 0
FREQUENCY: 1
PALPITATIONS: 0
PARESTHESIAS: 0

## 2023-11-29 ENCOUNTER — TELEPHONE (OUTPATIENT)
Dept: ANTICOAGULATION | Facility: CLINIC | Age: 58
End: 2023-11-29

## 2023-11-29 ENCOUNTER — OFFICE VISIT (OUTPATIENT)
Dept: FAMILY MEDICINE | Facility: CLINIC | Age: 58
End: 2023-11-29
Payer: COMMERCIAL

## 2023-11-29 VITALS
RESPIRATION RATE: 19 BRPM | HEIGHT: 64 IN | TEMPERATURE: 98.1 F | OXYGEN SATURATION: 100 % | BODY MASS INDEX: 29.3 KG/M2 | WEIGHT: 171.6 LBS | SYSTOLIC BLOOD PRESSURE: 149 MMHG | HEART RATE: 84 BPM | DIASTOLIC BLOOD PRESSURE: 94 MMHG

## 2023-11-29 DIAGNOSIS — Z12.11 SCREEN FOR COLON CANCER: ICD-10-CM

## 2023-11-29 DIAGNOSIS — Z86.718 PERSONAL HISTORY OF DVT (DEEP VEIN THROMBOSIS): Primary | ICD-10-CM

## 2023-11-29 DIAGNOSIS — F17.200 SMOKER: ICD-10-CM

## 2023-11-29 DIAGNOSIS — Z12.4 CERVICAL CANCER SCREENING: ICD-10-CM

## 2023-11-29 DIAGNOSIS — Z00.00 ROUTINE GENERAL MEDICAL EXAMINATION AT A HEALTH CARE FACILITY: Primary | ICD-10-CM

## 2023-11-29 DIAGNOSIS — B37.31 YEAST INFECTION OF THE VAGINA: ICD-10-CM

## 2023-11-29 DIAGNOSIS — I10 BENIGN ESSENTIAL HYPERTENSION: ICD-10-CM

## 2023-11-29 PROCEDURE — 90677 PCV20 VACCINE IM: CPT | Performed by: PHYSICIAN ASSISTANT

## 2023-11-29 PROCEDURE — 87624 HPV HI-RISK TYP POOLED RSLT: CPT | Performed by: PHYSICIAN ASSISTANT

## 2023-11-29 PROCEDURE — G0124 SCREEN C/V THIN LAYER BY MD: HCPCS | Performed by: PATHOLOGY

## 2023-11-29 PROCEDURE — 99396 PREV VISIT EST AGE 40-64: CPT | Mod: 25 | Performed by: PHYSICIAN ASSISTANT

## 2023-11-29 PROCEDURE — G0145 SCR C/V CYTO,THINLAYER,RESCR: HCPCS | Performed by: PHYSICIAN ASSISTANT

## 2023-11-29 PROCEDURE — 90682 RIV4 VACC RECOMBINANT DNA IM: CPT | Performed by: PHYSICIAN ASSISTANT

## 2023-11-29 PROCEDURE — 99214 OFFICE O/P EST MOD 30 MIN: CPT | Mod: 25 | Performed by: PHYSICIAN ASSISTANT

## 2023-11-29 PROCEDURE — 90471 IMMUNIZATION ADMIN: CPT | Performed by: PHYSICIAN ASSISTANT

## 2023-11-29 PROCEDURE — 90472 IMMUNIZATION ADMIN EACH ADD: CPT | Performed by: PHYSICIAN ASSISTANT

## 2023-11-29 RX ORDER — FLUCONAZOLE 150 MG/1
150 TABLET ORAL ONCE
Qty: 1 TABLET | Refills: 0 | Status: SHIPPED | OUTPATIENT
Start: 2023-11-29 | End: 2023-11-30

## 2023-11-29 RX ORDER — NIFEDIPINE 90 MG/1
90 TABLET, EXTENDED RELEASE ORAL DAILY
Qty: 90 TABLET | Refills: 1 | Status: SHIPPED | OUTPATIENT
Start: 2023-11-29

## 2023-11-29 ASSESSMENT — ENCOUNTER SYMPTOMS
DIZZINESS: 0
PARESTHESIAS: 0
COUGH: 0
ABDOMINAL PAIN: 0
DYSURIA: 0
JOINT SWELLING: 0
FREQUENCY: 1
FEVER: 0
HEMATOCHEZIA: 0
ARTHRALGIAS: 1
CHILLS: 1
NERVOUS/ANXIOUS: 1
PALPITATIONS: 0
NAUSEA: 0
MYALGIAS: 1
DIARRHEA: 0
BREAST MASS: 0
EYE PAIN: 0
SHORTNESS OF BREATH: 0
CONSTIPATION: 0
HEADACHES: 0
WEAKNESS: 1
SORE THROAT: 0
HEMATURIA: 0
HEARTBURN: 0

## 2023-11-29 NOTE — TELEPHONE ENCOUNTER
ANTICOAGULATION  MANAGEMENT     Interacting Medication Review    Interacting medication(s): Fluconazole (Diflucan) with warfarin.    Duration: Single dose on 11/29/23    Indication:  yeast infection of the vagina     New medication?: Yes, interaction may increase INR and risk of bleeding. Closer INR monitoring recommended.      PLAN     Continue your current warfarin dose with next INR in 2 weeks        Discussed with patient - the fluconazole was sent to her mail order pharmacy. Patient will not receive dose probably until next Wed or Thursday. Patient to check 12/11/23. However, if patient receives medication earlier next week - please call [facility] to see if INR should be checked sooner.    Summary  As of 11/29/2023      Full warfarin instructions:  7.5 mg every Thu; 5 mg all other days   Next INR check:  12/11/2023               Telephone call with Geraldine who verbalizes understanding and agrees to plan    New recheck date updated on anticoagulation calendar     Plan made per [facility] anticoagulation protocol    Ethel Adams RN  Anticoagulation Clinic

## 2023-11-29 NOTE — PROGRESS NOTES
SUBJECTIVE:   Geraldine is a 58 year old, presenting for the following:  Physical and Health Maintenance        11/29/2023    10:51 AM   Additional Questions   Roomed by chuck cox       Healthy Habits:     Getting at least 3 servings of Calcium per day:  NO    Bi-annual eye exam:  NO    Dental care twice a year:  NO    Sleep apnea or symptoms of sleep apnea:  Excessive snoring    Diet:  Low salt, Low fat/cholesterol, Carbohydrate counting and Breakfast skipped    Frequency of exercise:  1 day/week    Duration of exercise:  Less than 15 minutes    Taking medications regularly:  Yes    Medication side effects:  Not applicable    Additional concerns today:  Yes    Smoking- was working on quitting now stressors.   Following closely with oncology.                 Social History     Tobacco Use    Smoking status: Every Day     Packs/day: 0.50     Years: 35.00     Additional pack years: 0.00     Total pack years: 17.50     Types: Cigarettes     Start date: 1/1/1979    Smokeless tobacco: Never   Substance Use Topics    Alcohol use: Yes     Comment: 3-4 beers weekly             11/28/2023     8:30 PM   Alcohol Use   Prescreen: >3 drinks/day or >7 drinks/week? No     Reviewed orders with patient.  Reviewed health maintenance and updated orders accordingly - Yes  Labs reviewed in EPIC    Breast Cancer Screening:        10/28/2022     9:48 AM 2/9/2023     9:47 AM 2/15/2023     9:30 AM   Breast CA Risk Assessment (FHS-7)   Do you have a family history of breast, colon, or ovarian cancer? Yes Yes No / Unknown       click delete button to remove this line now  Mammogram Screening - Alternate mammogram schedule due to breast cancer history  Pertinent mammograms are reviewed under the imaging tab.    History of abnormal Pap smear: NO - age 30-65 PAP every 5 years with negative HPV co-testing recommended      Latest Ref Rng & Units 11/10/2017     9:39 AM 11/10/2017     9:30 AM   PAP / HPV   PAP (Historical)  LSIL     HPV 16 DNA  "NEG^Negative  Positive    HPV 18 DNA NEG^Negative  Positive    Other HR HPV NEG^Negative  Negative      Reviewed and updated as needed this visit by clinical staff   Tobacco  Allergies  Meds  Problems  Med Hx  Surg Hx  Fam Hx          Reviewed and updated as needed this visit by Provider   Tobacco  Allergies  Meds  Problems  Med Hx  Surg Hx  Fam Hx             Review of Systems   Constitutional:  Positive for chills. Negative for fever.   HENT:  Positive for congestion. Negative for ear pain, hearing loss and sore throat.    Eyes:  Negative for pain and visual disturbance.   Respiratory:  Negative for cough and shortness of breath.    Cardiovascular:  Positive for peripheral edema. Negative for chest pain and palpitations.   Gastrointestinal:  Negative for abdominal pain, constipation, diarrhea, heartburn, hematochezia and nausea.   Breasts:  Positive for tenderness. Negative for breast mass and discharge.   Genitourinary:  Positive for frequency. Negative for dysuria, genital sores, hematuria, pelvic pain, urgency, vaginal bleeding and vaginal discharge.   Musculoskeletal:  Positive for arthralgias and myalgias. Negative for joint swelling.   Skin:  Negative for rash.   Neurological:  Positive for weakness. Negative for dizziness, headaches and paresthesias.   Psychiatric/Behavioral:  Negative for mood changes. The patient is nervous/anxious.           OBJECTIVE:   BP (!) 149/94 (BP Location: Left arm, Patient Position: Chair, Cuff Size: Adult Regular)   Pulse 84   Temp 98.1  F (36.7  C) (Oral)   Resp 19   Ht 1.632 m (5' 4.25\")   Wt 77.8 kg (171 lb 9.6 oz)   SpO2 100%   BMI 29.23 kg/m    Physical Exam  GENERAL: healthy, alert and no distress  EYES: Eyes grossly normal to inspection, PERRL and conjunctivae and sclerae normal  HENT: ear canals and TM's normal, nose and mouth without ulcers or lesions  NECK: no adenopathy, no asymmetry, masses, or scars and thyroid normal to palpation  RESP: lungs " "clear to auscultation - no rales, rhonchi or wheezes  CV: regular rate and rhythm, normal S1 S2, no S3 or S4, no murmur, click or rub,   ABDOMEN: soft, nontender, no hepatosplenomegaly, no masses    (female): normal female external genitalia, normal urethral meatus, vaginal mucosa pink, moist, well rugated, and normal cervix- thick white discharge noted  MS: no gross musculoskeletal defects noted, no edema  SKIN: no suspicious lesions or rashes  NEURO: Normal strength and tone, mentation intact and speech normal  PSYCH: mentation appears normal, affect normal/bright        ASSESSMENT/PLAN:   (Z00.00) Routine general medical examination at a health care facility  (primary encounter diagnosis)  Comment:   Plan:     (Z12.11) Screen for colon cancer  Comment:   Plan: COLOGUARD([employer])            (Z12.4) Cervical cancer screening  Comment:   Plan: Pap Screen with HPV - recommended age 30 - 65         years            (I10) Benign essential hypertension  Comment:   Plan: NIFEdipine ER OSMOTIC (PROCARDIA XL) 90 MG 24         hr tablet        Not well controlled. Increase to 90mg daily. Recheck at upcoming oncology appointment.     (F17.200) Smoker  Comment:   Plan: encouraged cessation. Patient will consider.     (B37.31) Yeast infection of the vagina  Comment:   Plan: fluconazole (DIFLUCAN) 150 MG tablet        Will treat with a 1 time diflucan.           COUNSELING:  Reviewed preventive health counseling, as reflected in patient instructions       Regular exercise       Healthy diet/nutrition       Colorectal Cancer Screening      BMI:   Estimated body mass index is 29.23 kg/m  as calculated from the following:    Height as of this encounter: 1.632 m (5' 4.25\").    Weight as of this encounter: 77.8 kg (171 lb 9.6 oz).   Weight management plan: Discussed healthy diet and exercise guidelines      She reports that she has been smoking cigarettes. She started smoking about 44 years ago. She has a 17.50 pack-year " smoking history. She has never used smokeless tobacco.  Nicotine/Tobacco Cessation Plan:   Information offered: Patient not interested at this time          VOLODYMYR [facility]

## 2023-11-29 NOTE — NURSING NOTE
Prior to immunization administration, verified patients identity using patient s name and date of birth. Please see Immunization Activity for additional information.     Screening Questionnaire for Adult Immunization    Are you sick today?   No   Do you have allergies to medications, food, a vaccine component or latex?   No   Have you ever had a serious reaction after receiving a vaccination?   No   Do you have a long-term health problem with heart, lung, kidney, or metabolic disease (e.g., diabetes), asthma, a blood disorder, no spleen, complement component deficiency, a cochlear implant, or a spinal fluid leak?  Are you on long-term aspirin therapy?   No   Do you have cancer, leukemia, HIV/AIDS, or any other immune system problem?   No   Do you have a parent, brother, or sister with an immune system problem?   No   In the past 3 months, have you taken medications that affect  your immune system, such as prednisone, other steroids, or anticancer drugs; drugs for the treatment of rheumatoid arthritis, Crohn s disease, or psoriasis; or have you had radiation treatments?   No   Have you had a seizure, or a brain or other nervous system problem?   No   During the past year, have you received a transfusion of blood or blood    products, or been given immune (gamma) globulin or antiviral drug?   No   For women: Are you pregnant or is there a chance you could become       pregnant during the next month?   No   Have you received any vaccinations in the past 4 weeks?   No     Immunization questionnaire answers were all negative.      Patient instructed to remain in clinic for 15 minutes afterwards, and to report any adverse reactions.     Screening performed by Sri Green CMA on 11/29/2023 at 11:32 AM.

## 2023-12-01 LAB
BKR LAB AP GYN ADEQUACY: ABNORMAL
BKR LAB AP GYN INTERPRETATION: ABNORMAL
BKR LAB AP HPV REFLEX: ABNORMAL
BKR LAB AP PREVIOUS ABNL DX: ABNORMAL
BKR LAB AP PREVIOUS ABNORMAL: ABNORMAL
PATH REPORT.COMMENTS IMP SPEC: ABNORMAL
PATH REPORT.COMMENTS IMP SPEC: ABNORMAL
PATH REPORT.RELEVANT HX SPEC: ABNORMAL

## 2023-12-04 ENCOUNTER — PATIENT OUTREACH (OUTPATIENT)
Dept: FAMILY MEDICINE | Facility: CLINIC | Age: 58
End: 2023-12-04

## 2023-12-04 LAB
HUMAN PAPILLOMA VIRUS 16 DNA: POSITIVE
HUMAN PAPILLOMA VIRUS 18 DNA: NEGATIVE
HUMAN PAPILLOMA VIRUS FINAL DIAGNOSIS: ABNORMAL
HUMAN PAPILLOMA VIRUS OTHER HR: NEGATIVE

## 2023-12-05 ENCOUNTER — ANCILLARY PROCEDURE (OUTPATIENT)
Dept: MAMMOGRAPHY | Facility: CLINIC | Age: 58
End: 2023-12-05
Attending: INTERNAL MEDICINE
Payer: COMMERCIAL

## 2023-12-05 DIAGNOSIS — Z17.0 MALIGNANT NEOPLASM OF UPPER-OUTER QUADRANT OF LEFT BREAST IN FEMALE, ESTROGEN RECEPTOR POSITIVE (H): ICD-10-CM

## 2023-12-05 DIAGNOSIS — C50.412 MALIGNANT NEOPLASM OF UPPER-OUTER QUADRANT OF LEFT BREAST IN FEMALE, ESTROGEN RECEPTOR POSITIVE (H): ICD-10-CM

## 2023-12-05 PROCEDURE — 77062 BREAST TOMOSYNTHESIS BI: CPT

## 2023-12-05 PROCEDURE — 76642 ULTRASOUND BREAST LIMITED: CPT | Mod: LT

## 2023-12-06 ENCOUNTER — TELEPHONE (OUTPATIENT)
Dept: FAMILY MEDICINE | Facility: CLINIC | Age: 58
End: 2023-12-06

## 2023-12-06 ENCOUNTER — MYC REFILL (OUTPATIENT)
Dept: ONCOLOGY | Facility: HOSPITAL | Age: 58
End: 2023-12-06
Payer: COMMERCIAL

## 2023-12-06 DIAGNOSIS — C50.412 MALIGNANT NEOPLASM OF UPPER-OUTER QUADRANT OF LEFT BREAST IN FEMALE, ESTROGEN RECEPTOR POSITIVE (H): ICD-10-CM

## 2023-12-06 DIAGNOSIS — Z17.0 MALIGNANT NEOPLASM OF UPPER-OUTER QUADRANT OF LEFT BREAST IN FEMALE, ESTROGEN RECEPTOR POSITIVE (H): ICD-10-CM

## 2023-12-06 NOTE — TELEPHONE ENCOUNTER
Patient Quality Outreach    Patient is due for the following:   Hypertension -  BP check  Colon Cancer Screening      Topic Date Due    Hepatitis B Vaccine (1 of 3 - 3-dose series) Never done    Zoster (Shingles) Vaccine (1 of 2) Never done    COVID-19 Vaccine (2 - Rebecca risk series) 08/06/2021    Diptheria Tetanus Pertussis (DTAP/TDAP/TD) Vaccine (2 - Td or Tdap) 08/27/2023       Next Steps:   Schedule a nurse only visit for bp check    Type of outreach:    Sent [employer] message.      Questions for provider review:    None           Sri Green CMA  Chart routed to Care Team.

## 2023-12-06 NOTE — LETTER
December 6, 2023    To  Renée Mcfadden  [address] [facility] [address]    Your team at [facility] cares about your health. We have reviewed your chart and based on our findings; we are making the following recommendations to better manage your health.     You are in particular need of attention regarding the following:     HYPERTENSION FOLLOW UP: Blood pressure check with nurse  Please schedule a Nurse Only Appointment with your primary care clinic to update your immunizations that are due.    If you have already completed these items, please contact the clinic via phone or   Tech urSelfhart so your care team can review and update your records. Thank you for   choosing [facility] for your healthcare needs. For any questions,   concerns, or to schedule an appointment please contact our clinic.    Healthy Regards,      Your [facility] Care Team

## 2023-12-06 NOTE — LETTER
January 30, 2024    To  Renée Mcfadden  [address] [facility] [address]    Your team at [facility] cares about your health. We have reviewed your chart and based on our findings; we are making the following recommendations to better manage your health.     You are in particular need of attention regarding the following:     HYPERTENSION FOLLOW UP: Blood pressure check with nurse  Call or MyChart message your clinic to schedule a colonoscopy, schedule/ a FIT Test, or order a Cologuard test. If you are unsure what type of test you need, please call your clinic and speak to clinic staff.   Colon cancer is now the second leading cause of cancer-related deaths in the [facility] for both men and women and there are over 130,000 new cases and 50,000 deaths per year from colon cancer. Colonoscopies can prevent 90-95% of these deaths. Problem lesions can be removed before they ever become cancer. This test is not only looking for cancer, but also getting rid of precancerous lesions.   If you are under/uninsured, we recommend you contact the [employer] Program.[employer] is a free colorectal cancer screening program that provides colonoscopies for eligible under/uninsured [place] men and women. If you are interested in receiving a free colonoscopy, please call [employer] at t [phone] (mention code [employer]) to see if you're eligible. Please have them send us the results.     If you have already completed these items, please contact the clinic via phone or   Greenboxhart so your care team can review and update your records. Thank you for   choosing [facility] Clinics for your healthcare needs. For any questions,   concerns, or to schedule an appointment please contact our clinic.    Healthy Regards,      Your [facility] Care Team

## 2023-12-07 ENCOUNTER — TELEPHONE (OUTPATIENT)
Dept: OBGYN | Facility: CLINIC | Age: 58
End: 2023-12-07
Payer: COMMERCIAL

## 2023-12-07 RX ORDER — LETROZOLE 2.5 MG/1
2.5 TABLET, FILM COATED ORAL DAILY
Qty: 60 TABLET | Refills: 1 | Status: SHIPPED | OUTPATIENT
Start: 2023-12-07 | End: 2024-04-04

## 2023-12-07 NOTE — TELEPHONE ENCOUNTER
I called and helped schedule patient for Colposcopy at [place] on 1/16/24 at 10:45  Told [id number] come 10 minutes early.  MATY Zimmer 12/7/2023

## 2023-12-07 NOTE — TELEPHONE ENCOUNTER
[facility]    Phone Message    May a detailed message be left on voicemail: yes     Reason for Call: Other: . Pt is calling to schedule a colposcopy in [place], writer unable to reach complex , please call pt, thank you!    Action Taken: Message routed to:  Other: obgyn    Travel Screening: Not Applicable

## 2023-12-11 ENCOUNTER — ANTICOAGULATION THERAPY VISIT (OUTPATIENT)
Dept: ANTICOAGULATION | Facility: CLINIC | Age: 58
End: 2023-12-11

## 2023-12-11 ENCOUNTER — LAB (OUTPATIENT)
Dept: LAB | Facility: CLINIC | Age: 58
End: 2023-12-11
Payer: COMMERCIAL

## 2023-12-11 DIAGNOSIS — Z86.718 PERSONAL HISTORY OF DVT (DEEP VEIN THROMBOSIS): Primary | ICD-10-CM

## 2023-12-11 DIAGNOSIS — Z86.718 PERSONAL HISTORY OF DVT (DEEP VEIN THROMBOSIS): ICD-10-CM

## 2023-12-11 LAB — INR BLD: 3.1 (ref 0.9–1.1)

## 2023-12-11 PROCEDURE — 36416 COLLJ CAPILLARY BLOOD SPEC: CPT

## 2023-12-11 PROCEDURE — 85610 PROTHROMBIN TIME: CPT

## 2023-12-11 NOTE — PROGRESS NOTES
ANTICOAGULATION MANAGEMENT     Renée Mcfadden 58 year old female is on warfarin with supratherapeutic INR result. (Goal INR 2.0-3.0)    Recent labs: (last 7 days)     12/11/23  1025   INR 3.1*       ASSESSMENT     Source(s): Chart Review and Patient/Caregiver Call     Warfarin doses taken: Warfarin taken as instructed  Diet: No new diet changes identified  Medication/supplement changes:  Diflucan took one dose on 12/8/23 which may be increasing INR today  Nifedipine dose increased on  11/29/23, no interaction expected   New illness, injury, or hospitalization: No  Signs or symptoms of bleeding or clotting: No  Previous result: Therapeutic last 2(+) visits  Additional findings: None       PLAN     Recommended plan for temporary change(s) affecting INR     Dosing Instructions: partial hold then continue your current warfarin dose with next INR in 2 weeks       Summary  As of 12/11/2023      Full warfarin instructions:  12/11: 2.5 mg; Otherwise 7.5 mg every Thu; 5 mg all other days   Next INR check:  12/26/2023               Telephone call with Geraldine who verbalizes understanding and agrees to plan    Lab visit scheduled    Education provided:   Goal range and lab monitoring: goal range and significance of current result  Contact [phone]  with any changes, questions or concerns.     Plan made per [facility] anticoagulation protocol    Rajwinder Bunch RN  Anticoagulation Clinic  12/11/2023    _______________________________________________________________________     Anticoagulation Episode Summary       Current INR goal:  2.0-3.0   TTR:  64.2% (1 y)   Target end date:  Indefinite   Send INR reminders to:  DOUGIE RAVI    Indications    Personal history of DVT (deep vein thrombosis) [Z86.018]             Comments:               Anticoagulation Care Providers       Provider Role Specialty Phone number    Davidson Alegria MD Referring Family Medicine [phone]

## 2023-12-18 NOTE — PROGRESS NOTES
[facility] Hematology and Oncology Progress Note    Patient: Renée Mcfadden  MRN: [id number]  Date of Service: 04/07/2023        Reason for Visit    Chief Complaint   Patient presents with    Oncology Clinic Visit     MaliMalignant neoplasm of upper-outer quadrant of left breast in female, estrogen receptor positivegnant neoplasm of upper-outer quadrant of left breast in female, estrogen receptor positive, review labs          Problem List Items Addressed This Visit          Other    Malignant neoplasm of upper-outer quadrant of left breast in female, estrogen receptor positive (H)     Other Visit Diagnoses       Encounter for screening mammogram for breast cancer    -  Primary                Assessment and Plan  Early stage left-sided breast cancer hormone receptor positive and HER2 negative  Status postlumpectomy, pT1b, pN 0, cM0  Oncotype DX score 20  Adjuvant radiation was not considered  She is currently doing well on letrozole.  No significant side effects.  She does complain of some pain in the left breast at the surgical site.  On clinical exam today there is a questionable lump in the left axilla.  Not sure if this is just a postsurgical fibrosis or true lymphadenopathy.  Recommended getting ultrasound.  She is also due for her screening mammogram.  Will arrange these things right away.  Will see her back in 3 months or sooner if imaging studies are indicative of anything more sinister.  She is agreeable to the plan.     Cancer Staging   No matching staging information was found for the patient.      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    Deep vein thrombosis (DVT) of proximal lower extremity, unspecified chronicity, unspecified laterality (H)    Personal history of DVT (deep vein thrombosis)    Anticardiolipin  antibody positive    Mild aortic stenosis    Mild mitral stenosis    Malignant neoplasm of upper-outer quadrant of left breast in female, estrogen receptor positive (H)        Oncology history  Screening mammogram showed a focal asymmetry in the left upper breast at posterior depth. Ultrasound showed a 6 x 4 x 5 mm hypoechoic mass with microlobulated margins at 2 o'clock position the left breast about 8 cm from the nipple. Needle biopsy showed invasive ductal carcinoma, grade 1, with background of DCIS, strongly ER positive, HI negative and HER2 negative by IHC. No evidence of abnormal lymph nodes in the left axilla.     Underwent lumpectomy on 2/15/2023    The tumor measured 10 x 7 x 5 mm in size.  1 sentinel lymph node was removed which was negative for malignancy.  All the margins were negative.  pT1b, pN0, cM0.  Oncotype DX score came back at 20 which predicts no real benefit from adjuvant chemotherapy.      With a history of limited scleroderma and due to the fact that she had wide surgical margins, adjuvant radiation was not considered.    Started adjuvant endocrine therapy with letrozole from April 2023.    Interval History   Renée Mcfadden is a 57 year old female with a recent history of early-stage left-sided breast cancer status postlumpectomy who is currently on letrozole for adjuvant endocrine therapy here for follow-up.    Currently on letrozole.  Doing well.  No significant side effects.  She has noticed some pain in her left breast at the lumpectomy scar.  No weight loss.  No bone pain.        Review of Systems  A comprehensive review of systems was negative except for what is noted in the interval history    Current Outpatient Medications   Medication    triamcinolone (KENALOG) 0.1 % external ointment    warfarin ANTICOAGULANT (JANTOVEN ANTICOAGULANT) 5 MG tablet    fluconazole (DIFLUCAN) 150 MG tablet    letrozole (FEMARA) 2.5 MG tablet    NIFEdipine ER OSMOTIC (PROCARDIA XL) 90 MG 24 hr tablet      No current facility-administered medications for this visit.        Physical Exam        General: alert and cooperative  HEENT: Head: Normal, normocephalic, atraumatic.  Eye: Normal external eye, conjunctiva, lids cornea, CASSIUS.  Breast: Lumpectomy scar noted in the left breast.  Healing well.  Questionable left axillary lump.  No adenopathy on the right side  CNS: Alert and oriented x3, neurologic exam grossly normal.      Lab Results    No results found for this or any previous visit (from the past 168 hour(s)).      Imaging    MA Diagnostic Bilateral w/Karli    Result Date: 12/5/2023  EXAM: MA DIAGNOSTIC BILATERAL W/ KARLI, US BREAST LEFT LIMITED 1-3 QUADRANTS LOCATION: [facility] DATE: 12/5/2023 INDICATION: 58-year-old female presents with intermittent pain within the left lateral breast for a few months. Patient denies any associated palpable lumps. This area of pain is located just lateral to her lumpectomy scar. The patient's referring provider also noted a palpable lump within the left axilla. Personal history of left breast cancer status post breast conservation therapy in February 2023. COMPARISON: 02/15/2023, 01/18/2023, 01/13/2023, 12/09/2022, 12/04/2017 MAMMOGRAPHIC FINDINGS: Bilateral full-field digital diagnostic mammograms performed. There are scattered areas of fibroglandular density. Images evaluated with the assistance of CAD. Breast tomosynthesis was used in interpretation. There are interval breast conservation changes within the left lateral breast. Otherwise, there are no new or suspicious masses, calcifications, or architectural distortion within either breast. Recommend targeted left breast ultrasound at the area of intermittent pain for further evaluation as well as sonographic evaluation of the left axilla at the area of palpable concern noted by the patient's referring provider. ULTRASOUND FINDINGS: Physical examination of the left axilla does not demonstrate a  discrete palpable mass. Targeted left breast ultrasound at the area of intermittent pain, located just lateral to the patient's lumpectomy site, at the 2:00-3:00 position, 10 cm from the nipple demonstrates normal-appearing fatty tissue without underlying suspicious mass or other focal sonographic abnormality. Sonographic evaluation of the left axilla was performed. A few nonenlarged and morphologically normal lymph nodes are demonstrated. Otherwise, there are no underlying suspicious masses or lymphadenopathy.     IMPRESSION: 1.  No imaging correlate to the palpable area of concern noted by the patient's referring provider within the left axilla, for which management should be based clinically. 2.  No imaging correlate to reported symptom of intermittent left lateral breast pain, for which management should be based clinically. 3.  No mammographic evidence of malignancy within either breast. Interval breast conservation changes within the left lateral breast. ACR BI-RADS Category 2: Benign. Return to annual screening schedule is recommended. I personally scanned and discussed the findings and recommendations with the patient at the conclusion of the examination.     US Breast Left    Result Date: 12/5/2023  EXAM: MA DIAGNOSTIC BILATERAL W/ KARLI, US BREAST LEFT LIMITED 1-3 QUADRANTS LOCATION: [facility] DATE: 12/5/2023 INDICATION: 58-year-old female presents with intermittent pain within the left lateral breast for a few months. Patient denies any associated palpable lumps. This area of pain is located just lateral to her lumpectomy scar. The patient's referring provider also noted a palpable lump within the left axilla. Personal history of left breast cancer status post breast conservation therapy in February 2023. COMPARISON: 02/15/2023, 01/18/2023, 01/13/2023, 12/09/2022, 12/04/2017 MAMMOGRAPHIC FINDINGS: Bilateral full-field digital diagnostic mammograms performed. There are scattered  areas of fibroglandular density. Images evaluated with the assistance of CAD. Breast tomosynthesis was used in interpretation. There are interval breast conservation changes within the left lateral breast. Otherwise, there are no new or suspicious masses, calcifications, or architectural distortion within either breast. Recommend targeted left breast ultrasound at the area of intermittent pain for further evaluation as well as sonographic evaluation of the left axilla at the area of palpable concern noted by the patient's referring provider. ULTRASOUND FINDINGS: Physical examination of the left axilla does not demonstrate a discrete palpable mass. Targeted left breast ultrasound at the area of intermittent pain, located just lateral to the patient's lumpectomy site, at the 2:00-3:00 position, 10 cm from the nipple demonstrates normal-appearing fatty tissue without underlying suspicious mass or other focal sonographic abnormality. Sonographic evaluation of the left axilla was performed. A few nonenlarged and morphologically normal lymph nodes are demonstrated. Otherwise, there are no underlying suspicious masses or lymphadenopathy.     IMPRESSION: 1.  No imaging correlate to the palpable area of concern noted by the patient's referring provider within the left axilla, for which management should be based clinically. 2.  No imaging correlate to reported symptom of intermittent left lateral breast pain, for which management should be based clinically. 3.  No mammographic evidence of malignancy within either breast. Interval breast conservation changes within the left lateral breast. ACR BI-RADS Category 2: Benign. Return to annual screening schedule is recommended. I personally scanned and discussed the findings and recommendations with the patient at the conclusion of the examination.        Signed by: Jorge Guevara MD

## 2023-12-26 ENCOUNTER — LAB (OUTPATIENT)
Dept: LAB | Facility: CLINIC | Age: 58
End: 2023-12-26
Payer: COMMERCIAL

## 2023-12-26 ENCOUNTER — ANTICOAGULATION THERAPY VISIT (OUTPATIENT)
Dept: ANTICOAGULATION | Facility: CLINIC | Age: 58
End: 2023-12-26

## 2023-12-26 DIAGNOSIS — Z86.718 PERSONAL HISTORY OF DVT (DEEP VEIN THROMBOSIS): Primary | ICD-10-CM

## 2023-12-26 DIAGNOSIS — Z86.718 PERSONAL HISTORY OF DVT (DEEP VEIN THROMBOSIS): ICD-10-CM

## 2023-12-26 LAB — INR BLD: 2.4 (ref 0.9–1.1)

## 2023-12-26 PROCEDURE — 36416 COLLJ CAPILLARY BLOOD SPEC: CPT

## 2023-12-26 PROCEDURE — 85610 PROTHROMBIN TIME: CPT

## 2023-12-26 NOTE — PROGRESS NOTES
ANTICOAGULATION MANAGEMENT     Renée Mcfadden 58 year old female is on warfarin with therapeutic INR result. (Goal INR 2.0-3.0)    Recent labs: (last 7 days)     12/26/23  1021   INR 2.4*       ASSESSMENT     Source(s): Chart Review and Patient/Caregiver Call     Warfarin doses taken: Warfarin taken as instructed  Diet: No new diet changes identified  Medication/supplement changes: None noted  New illness, injury, or hospitalization: No  Signs or symptoms of bleeding or clotting: Yes: has a head cold  Previous result: Supratherapeutic  Additional findings: None       PLAN     Recommended plan for no diet, medication or health factor changes affecting INR     Dosing Instructions: Continue your current warfarin dose with next INR in 3 weeks       Summary  As of 12/26/2023      Full warfarin instructions:  7.5 mg every Thu; 5 mg all other days   Next INR check:  1/16/2024               Telephone call with Geraldine who verbalizes understanding and agrees to plan    Lab visit scheduled    Education provided:   Goal range and lab monitoring: goal range and significance of current result    Plan made per [facility] anticoagulation protocol    Farrah Lopez RN  Anticoagulation Clinic  12/26/2023    _______________________________________________________________________     Anticoagulation Episode Summary       Current INR goal:  2.0-3.0   TTR:  63.6% (1 y)   Target end date:  Indefinite   Send INR reminders to:  DOUGIE RAVI    Indications    Personal history of DVT (deep vein thrombosis) [Z86.718]             Comments:               Anticoagulation Care Providers       Provider Role Specialty Phone number    Davidson Alegria MD Referring Family Medicine [phone]

## 2023-12-26 NOTE — PROGRESS NOTES
ANTICOAGULATION MANAGEMENT     Renée Mcfadden 58 year old female is on warfarin with therapeutic INR result. (Goal INR 2.0-3.0)    Recent labs: (last 7 days)     12/26/23  1021   INR 2.4*       ASSESSMENT     Source(s): Chart Review  Previous INR was Supratherapeutic  Medication, diet, health changes since last INR chart reviewed; none identified         PLAN     Unable to reach [facility] today.    No instructions provided. Unable to leave voicemail.    Follow up required to confirm warfarin dose taken and assess for changes    Farrah Lopez RN  Anticoagulation Clinic  12/26/2023

## 2024-01-22 DIAGNOSIS — Z86.718 PERSONAL HISTORY OF DVT (DEEP VEIN THROMBOSIS): ICD-10-CM

## 2024-01-22 RX ORDER — WARFARIN SODIUM 5 MG/1
TABLET ORAL
Qty: 96 TABLET | Refills: 1 | Status: SHIPPED | OUTPATIENT
Start: 2024-01-22 | End: 2024-06-18

## 2024-01-22 NOTE — TELEPHONE ENCOUNTER
ANTICOAGULATION MANAGEMENT:  Medication Refill    Anticoagulation Summary  As of 12/26/2023      Warfarin maintenance plan:  7.5 mg (5 mg x 1.5) every Thu; 5 mg (5 mg x 1) all other days   Next INR check:  1/16/2024   Target end date:  Indefinite    Indications    Personal history of DVT (deep vein thrombosis) [Z86.718]                 Anticoagulation Care Providers       Provider Role Specialty Phone number    Davidson Alegria MD Referring Family Medicine [phone]            Refill Criteria    Visit with referring provider/group: Meets criteria: office visit within referring provider group in the last 1 year on 11-    [facility] referral signed last signed: 11/03/2023; within last year: Yes    Lab monitoring not exceeding 2 weeks overdue: Yes    Renée meets all criteria for refill. Rx instructions and quantity supplied updated to match patient's current dosing plan. Warfarin 90 day supply with 1 refill granted per [facility] protocol     Edilia Dubois RN  Anticoagulation Clinic

## 2024-01-25 ENCOUNTER — MYC MEDICAL ADVICE (OUTPATIENT)
Dept: ANTICOAGULATION | Facility: CLINIC | Age: 59
End: 2024-01-25

## 2024-01-25 PROBLEM — R87.612 PAPANICOLAOU SMEAR OF CERVIX WITH LOW GRADE SQUAMOUS INTRAEPITHELIAL LESION (LGSIL): Status: ACTIVE | Noted: 2017-11-10

## 2024-01-30 NOTE — TELEPHONE ENCOUNTER
Patient Quality Outreach    Patient is due for the following:   Hypertension -  BP check  Colon Cancer Screening      Topic Date Due    Hepatitis B Vaccine (1 of 3 - 3-dose series) Never done    Zoster (Shingles) Vaccine (1 of 2) Never done    COVID-19 Vaccine (2 - Rebecca risk series) 08/06/2021    Diptheria Tetanus Pertussis (DTAP/TDAP/TD) Vaccine (2 - Td or Tdap) 08/27/2023       Next Steps:   Schedule a nurse only visit for bp check     Type of outreach:    Sent letter.    Next Steps:  Reach out within 90 days via [employer].    Max number of attempts reached: Yes. Will try again in 90 days if patient still on fail list.    Questions for provider review:    None           Sri Green CMA  Chart routed to Care Team.

## 2024-02-01 ENCOUNTER — TELEPHONE (OUTPATIENT)
Dept: ANTICOAGULATION | Facility: CLINIC | Age: 59
End: 2024-02-01
Payer: COMMERCIAL

## 2024-02-01 NOTE — TELEPHONE ENCOUNTER
ANTICOAGULATION     Renée MASHA Mcfadden is overdue for an INR check.     Spoke with Geraldine and scheduled lab appointment on 2/2/24    Farrah Lopez RN

## 2024-02-02 ENCOUNTER — LAB (OUTPATIENT)
Dept: LAB | Facility: CLINIC | Age: 59
End: 2024-02-02
Payer: COMMERCIAL

## 2024-02-02 ENCOUNTER — ANTICOAGULATION THERAPY VISIT (OUTPATIENT)
Dept: ANTICOAGULATION | Facility: CLINIC | Age: 59
End: 2024-02-02

## 2024-02-02 DIAGNOSIS — Z86.718 PERSONAL HISTORY OF DVT (DEEP VEIN THROMBOSIS): Primary | ICD-10-CM

## 2024-02-02 DIAGNOSIS — Z86.718 PERSONAL HISTORY OF DVT (DEEP VEIN THROMBOSIS): ICD-10-CM

## 2024-02-02 LAB — INR BLD: 1.6 (ref 0.9–1.1)

## 2024-02-02 PROCEDURE — 85610 PROTHROMBIN TIME: CPT

## 2024-02-02 PROCEDURE — 36416 COLLJ CAPILLARY BLOOD SPEC: CPT

## 2024-02-02 NOTE — PROGRESS NOTES
ANTICOAGULATION MANAGEMENT     Renée Mcfadden 58 year old female is on warfarin with subtherapeutic INR result. (Goal INR 2.0-3.0)    Recent labs: (last 7 days)     02/02/24  1312   INR 1.6*       ASSESSMENT     Source(s): Chart Review and Patient/Caregiver Call     Warfarin doses taken: Warfarin taken as instructed  Diet: No new diet changes identified  Medication/supplement changes: None noted  New illness, injury, or hospitalization: No  Signs or symptoms of bleeding or clotting: Yes, patient reports having one episode of epistaxis. Advised to monitor.   Previous result: Subtherapeutic  Additional findings: None       PLAN     Recommended plan for no diet, medication or health factor changes affecting INR     Dosing Instructions: Increase your warfarin dose (13.3% change) with next INR in 2 weeks       Summary  As of 2/2/2024      Full warfarin instructions:  7.5 mg every Tue, Thu, Sat; 5 mg all other days   Next INR check:  2/16/2024               Telephone call with [facility] who agrees to plan and repeated back plan correctly    Lab visit scheduled    Education provided:   Contact [phone]  with any changes, questions or concerns.     Plan made per [facility] anticoagulation protocol    Edilia Navarro RN  Anticoagulation Clinic  2/2/2024    _______________________________________________________________________     Anticoagulation Episode Summary       Current INR goal:  2.0-3.0   TTR:  58.4% (1 y)   Target end date:  Indefinite   Send INR reminders to:  DOUGIE RAVI    Indications    Personal history of DVT (deep vein thrombosis) [Z86.718]             Comments:               Anticoagulation Care Providers       Provider Role Specialty Phone number    Davidson Alegria MD Referring Family Medicine [phone]

## 2024-02-23 ENCOUNTER — ANTICOAGULATION THERAPY VISIT (OUTPATIENT)
Dept: ANTICOAGULATION | Facility: CLINIC | Age: 59
End: 2024-02-23

## 2024-02-23 ENCOUNTER — LAB (OUTPATIENT)
Dept: LAB | Facility: CLINIC | Age: 59
End: 2024-02-23
Payer: COMMERCIAL

## 2024-02-23 DIAGNOSIS — Z86.718 PERSONAL HISTORY OF DVT (DEEP VEIN THROMBOSIS): ICD-10-CM

## 2024-02-23 LAB — INR BLD: 2.3 (ref 0.9–1.1)

## 2024-02-23 PROCEDURE — 36415 COLL VENOUS BLD VENIPUNCTURE: CPT

## 2024-02-23 PROCEDURE — 85610 PROTHROMBIN TIME: CPT

## 2024-02-23 NOTE — PROGRESS NOTES
ANTICOAGULATION MANAGEMENT     Renée Mcfadden 58 year old female is on warfarin with therapeutic INR result. (Goal INR 2.0-3.0)    Recent labs: (last 7 days)     02/23/24  1321   INR 2.3*       ASSESSMENT     Source(s): Chart Review and Patient/Caregiver Call     Warfarin doses taken: Warfarin taken as instructed  Diet: No new diet changes identified  Medication/supplement changes: None noted  New illness, injury, or hospitalization: No  Signs or symptoms of bleeding or clotting: No  Previous result: Subtherapeutic  Additional findings: Maintenance dose increased on 2/2/24       PLAN       Dosing Instructions: Continue your current warfarin dose with next INR in 4 weeks       Summary  As of 2/23/2024      Full warfarin instructions:  7.5 mg every Tue, Thu, Sat; 5 mg all other days   Next INR check:  3/22/2024               Telephone call with Geraldine who verbalizes understanding and agrees to plan    Lab visit scheduled    Education provided:   Contact [phone]  with any changes, questions or concerns.     Plan made per [facility] anticoagulation protocol    Crystal Messer RN  Anticoagulation Clinic  2/23/2024    _______________________________________________________________________     Anticoagulation Episode Summary       Current INR goal:  2.0-3.0   TTR:  58.3% (1 y)   Target end date:  Indefinite   Send INR reminders to:  DOUGIE RAVI    Indications    Personal history of DVT (deep vein thrombosis) [Z86.718]             Comments:               Anticoagulation Care Providers       Provider Role Specialty Phone number    Davidson Alegria MD Referring Family Medicine [phone]

## 2024-02-27 ENCOUNTER — PATIENT OUTREACH (OUTPATIENT)
Dept: ONCOLOGY | Facility: HOSPITAL | Age: 59
End: 2024-02-27
Payer: COMMERCIAL

## 2024-03-01 ENCOUNTER — ONCOLOGY VISIT (OUTPATIENT)
Dept: ONCOLOGY | Facility: HOSPITAL | Age: 59
End: 2024-03-01
Attending: INTERNAL MEDICINE
Payer: COMMERCIAL

## 2024-03-01 VITALS
DIASTOLIC BLOOD PRESSURE: 80 MMHG | RESPIRATION RATE: 18 BRPM | OXYGEN SATURATION: 98 % | HEART RATE: 81 BPM | SYSTOLIC BLOOD PRESSURE: 140 MMHG | WEIGHT: 169 LBS | BODY MASS INDEX: 28.85 KG/M2 | TEMPERATURE: 98.2 F | HEIGHT: 64 IN

## 2024-03-01 DIAGNOSIS — Z17.0 MALIGNANT NEOPLASM OF UPPER-OUTER QUADRANT OF LEFT BREAST IN FEMALE, ESTROGEN RECEPTOR POSITIVE (H): Primary | ICD-10-CM

## 2024-03-01 DIAGNOSIS — C50.412 MALIGNANT NEOPLASM OF UPPER-OUTER QUADRANT OF LEFT BREAST IN FEMALE, ESTROGEN RECEPTOR POSITIVE (H): Primary | ICD-10-CM

## 2024-03-01 PROCEDURE — 99214 OFFICE O/P EST MOD 30 MIN: CPT | Performed by: INTERNAL MEDICINE

## 2024-03-01 PROCEDURE — 99213 OFFICE O/P EST LOW 20 MIN: CPT | Performed by: INTERNAL MEDICINE

## 2024-03-01 RX ORDER — SILDENAFIL CITRATE 20 MG/1
1 TABLET ORAL DAILY
COMMUNITY
Start: 2024-01-24

## 2024-03-01 ASSESSMENT — PAIN SCALES - GENERAL: PAINLEVEL: NO PAIN (0)

## 2024-03-01 NOTE — LETTER
"    3/1/2024         RE: Renée Mcfadden  [address]  [place] [facility] [address]        Dear Colleague,    Thank you for referring your patient, Renée Mcfadden, to the [facility]. Please see a copy of my visit note below.    Oncology Rooming Note    March 1, 2024 10:24 AM   Renée Mcfadden is a 58 year old female who presents for:    Chief Complaint   Patient presents with     Oncology Clinic Visit      3 Month return Malignant neoplasm of upper-outer quadrant of left breast in female, estrogen receptor positive      Initial Vitals: BP (!) 140/80 (BP Location: Right arm, Patient Position: Sitting, Cuff Size: Adult Regular)   Pulse 81   Temp 98.2  F (36.8  C) (Oral)   Resp 18   Ht 1.632 m (5' 4.25\")   Wt 76.7 kg (169 lb)   SpO2 98%   BMI 28.78 kg/m   Estimated body mass index is 28.78 kg/m  as calculated from the following:    Height as of this encounter: 1.632 m (5' 4.25\").    Weight as of this encounter: 76.7 kg (169 lb). Body surface area is 1.86 meters squared.  No Pain (0) Comment: Data Unavailable   No LMP recorded. Patient is postmenopausal.  Allergies reviewed: Yes  Medications reviewed: Yes    Medications: Medication refills not needed today.  Pharm[facility] into [employer] AT PORTAL TO REGISTERED [facility] SITES    Frailty Screening:   Is the patient here for a new oncology consult visit in cancer care? 1. Yes. Over the past month, have you experienced difficulty or required a caregiver to assist with:   1. Balance, walking or general mobility (including any falls)? NO  2. Completion of self-care tasks such as bathing, dressing, toileting, grooming/hygiene?  NO  3. Concentration or memory that affects your daily life?  NO       Clinical concerns:   3 Month return Malignant neoplasm of upper-outer quadrant of left breast in female, estrogen receptor positive       Holly DAHL" Sh[facility] Hematology and Oncology Progress Note    Patient: Renée Mcfadden  MRN: [id number]  Date of Service: 04/07/2023        Reason for Visit    Chief Complaint   Patient presents with     Oncology Clinic Visit      3 Month return Malignant neoplasm of upper-outer quadrant of left breast in female, estrogen receptor positive          Problem List Items Addressed This Visit          Other    Malignant neoplasm of upper-outer quadrant of left breast in female, estrogen receptor positive (H) - Primary             Assessment and Plan  Early stage left-sided breast cancer hormone receptor positive and HER2 negative  Status postlumpectomy, pT1b, pN 0, cM0  Oncotype DX score 20  Adjuvant radiation was not considered  She is currently doing well on letrozole.  No major side effects except for some hot flashes.  Last time she had some lump in the left axilla.  We did a diagnostic mammogram with ultrasound.  It came back unremarkable.  There were a few prominent but morphologically normal looking lymph nodes in the left axilla.  Clinical exam today is stable.  No new findings.  Continue letrozole for now.  Will see her back in 4 months.  She is agreeable to the plan.       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     Deep vein thrombosis (DVT) of proximal lower extremity, unspecified chronicity, unspecified laterality (H)     Personal history of DVT (deep vein thrombosis)     Anticardiolipin antibody positive      Mild aortic stenosis     Mild mitral stenosis     Malignant neoplasm of upper-outer quadrant of left breast in female, estrogen receptor positive (H)        Oncology history  Screening mammogram showed a focal asymmetry in the left upper breast at posterior depth. Ultrasound showed a 6 x 4 x 5 mm hypoechoic mass with microlobulated margins at 2 o'clock position the left breast about 8 cm from the nipple. Needle biopsy showed invasive ductal carcinoma, grade 1, with background of DCIS, strongly ER positive, MA negative and HER2 negative by IHC. No evidence of abnormal lymph nodes in the left axilla.     Underwent lumpectomy on 2/15/2023    The tumor measured 10 x 7 x 5 mm in size.  1 sentinel lymph node was removed which was negative for malignancy.  All the margins were negative.  pT1b, pN0, cM0.  Oncotype DX score came back at 20 which predicts no real benefit from adjuvant chemotherapy.      With a history of limited scleroderma and due to the fact that she had wide surgical margins, adjuvant radiation was not considered.    Started adjuvant endocrine therapy with letrozole from April 2023.    Interval History   Renée Mcfadden is a 57 year old female with a recent history of early-stage left-sided breast cancer status postlumpectomy who is currently on letrozole for adjuvant endocrine therapy here for follow-up.    Currently on letrozole.  Some hot flashes. No worsening of musculoskeletal pain. No new lumps.         Review of Systems  A comprehensive review of systems was negative except for what is noted in the interval history    Current Outpatient Medications   Medication     letrozole (FEMARA) 2.5 MG tablet     NIFEdipine ER OSMOTIC (PROCARDIA XL) 90 MG 24 hr tablet     triamcinolone (KENALOG) 0.1 % external ointment     warfarin ANTICOAGULANT (JANTOVEN ANTICOAGULANT) 5 MG tablet     fluconazole (DIFLUCAN) 150 MG tablet     sildenafil (REVATIO) 20 MG tablet     No current facility-administered medications  for this visit.        Physical Exam    Failed to redirect to the Timeline version of the [facility] SmartLink.    General: alert and cooperative  HEENT: Head: Normal, normocephalic, atraumatic.  Eye: Normal external eye, conjunctiva, lids cornea, CASSIUS.  Breast: No evidence of bilateral axilla adenopathy.  Full breast exam not done today.   CNS: Alert and oriented x3, neurologic exam grossly normal.      Lab Results    Recent Results (from the past 168 hour(s))   INR point of care   Result Value Ref Range    INR 2.3 (H) 0.9 - 1.1         Imaging    No results found.      Signed by: Jorge Guevara MD      Again, thank you for allowing me to participate in the care of your patient.        Sincerely,        Jorge Guevara MD

## 2024-03-01 NOTE — PROGRESS NOTES
[facility] Hematology and Oncology Progress Note    Patient: Renée Mcfadden  MRN: [id number]  Date of Service: 04/07/2023        Reason for Visit    Chief Complaint   Patient presents with    Oncology Clinic Visit      3 Month return Malignant neoplasm of upper-outer quadrant of left breast in female, estrogen receptor positive          Problem List Items Addressed This Visit          Other    Malignant neoplasm of upper-outer quadrant of left breast in female, estrogen receptor positive (H) - Primary             Assessment and Plan  Early stage left-sided breast cancer hormone receptor positive and HER2 negative  Status postlumpectomy, pT1b, pN 0, cM0  Oncotype DX score 20  Adjuvant radiation was not considered  She is currently doing well on letrozole.  No major side effects except for some hot flashes.  Last time she had some lump in the left axilla.  We did a diagnostic mammogram with ultrasound.  It came back unremarkable.  There were a few prominent but morphologically normal looking lymph nodes in the left axilla.  Clinical exam today is stable.  No new findings.  Continue letrozole for now.  Will see her back in 4 months.  She is agreeable to the plan.       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    Deep vein thrombosis (DVT) of proximal lower extremity, unspecified chronicity, unspecified laterality (H)    Personal history of DVT (deep vein thrombosis)    Anticardiolipin antibody positive    Mild aortic stenosis    Mild mitral  stenosis    Malignant neoplasm of upper-outer quadrant of left breast in female, estrogen receptor positive (H)        Oncology history  Screening mammogram showed a focal asymmetry in the left upper breast at posterior depth. Ultrasound showed a 6 x 4 x 5 mm hypoechoic mass with microlobulated margins at 2 o'clock position the left breast about 8 cm from the nipple. Needle biopsy showed invasive ductal carcinoma, grade 1, with background of DCIS, strongly ER positive, AK negative and HER2 negative by IHC. No evidence of abnormal lymph nodes in the left axilla.     Underwent lumpectomy on 2/15/2023    The tumor measured 10 x 7 x 5 mm in size.  1 sentinel lymph node was removed which was negative for malignancy.  All the margins were negative.  pT1b, pN0, cM0.  Oncotype DX score came back at 20 which predicts no real benefit from adjuvant chemotherapy.      With a history of limited scleroderma and due to the fact that she had wide surgical margins, adjuvant radiation was not considered.    Started adjuvant endocrine therapy with letrozole from April 2023.    Interval History   Renée Mcfadden is a 57 year old female with a recent history of early-stage left-sided breast cancer status postlumpectomy who is currently on letrozole for adjuvant endocrine therapy here for follow-up.    Currently on letrozole.  Some hot flashes. No worsening of musculoskeletal pain. No new lumps.         Review of Systems  A comprehensive review of systems was negative except for what is noted in the interval history    Current Outpatient Medications   Medication    letrozole (FEMARA) 2.5 MG tablet    NIFEdipine ER OSMOTIC (PROCARDIA XL) 90 MG 24 hr tablet    triamcinolone (KENALOG) 0.1 % external ointment    warfarin ANTICOAGULANT (JANTOVEN ANTICOAGULANT) 5 MG tablet    fluconazole (DIFLUCAN) 150 MG tablet    sildenafil (REVATIO) 20 MG tablet     No current facility-administered medications for this visit.        Physical  Exam    Failed to redirect [facility] the [facility] the [facility].    General: alert and cooperative  HEENT: Head: Normal, normocephalic, atraumatic.  Eye: Normal external eye, conjunctiva, lids cornea, CASSIUS.  Breast: No evidence of bilateral axilla adenopathy.  Full breast exam not done today.   CNS: Alert and oriented x3, neurologic exam grossly normal.      Lab Results    Recent Results (from the past 168 hour(s))   INR point of care   Result Value Ref Range    INR 2.3 (H) 0.9 - 1.1         Imaging    No results found.      Signed by: Jorge Guevara MD

## 2024-03-01 NOTE — PROGRESS NOTES
"Oncology Rooming Note    March 1, 2024 10:24 AM   Renée Mcfadden is a 58 year old female who presents for:    Chief Complaint   Patient presents with    Oncology Clinic Visit      3 Month return Malignant neoplasm of upper-outer quadrant of left breast in female, estrogen receptor positive      Initial Vitals: BP (!) 140/80 (BP Location: Right arm, Patient Position: Sitting, Cuff Size: Adult Regular)   Pulse 81   Temp 98.2  F (36.8  C) (Oral)   Resp 18   Ht 1.632 m (5' 4.25\")   Wt 76.7 kg (169 lb)   SpO2 98%   BMI 28.78 kg/m   Estimated body mass index is 28.78 kg/m  as calculated from the following:    Height as of this encounter: 1.632 m (5' 4.25\").    Weight as of this encounter: 76.7 kg (169 lb). Body surface area is 1.86 meters squared.  No Pain (0) Comment: Data Unavailable   No LMP recorded. Patient is postmenopausal.  Allergies reviewed: Yes  Medications reviewed: Yes    Medications: Medication refills not needed today.  Pharmacy name entered into [employer]: [facility] - ONE [facility] AT PORTAL TO REGISTERED [facility] SITES    Frailty Screening:   Is the patient here for a new oncology consult visit in cancer care? 1. Yes. Over the past month, have you experienced difficulty or required a caregiver to assist with:   1. Balance, walking or general mobility (including any falls)? NO  2. Completion of self-care tasks such as bathing, dressing, toileting, grooming/hygiene?  NO  3. Concentration or memory that affects your daily life?  NO       Clinical concerns:   3 Month return Malignant neoplasm of upper-outer quadrant of left breast in female, estrogen receptor positive       Holly Swann CMA            "

## 2024-03-29 ENCOUNTER — LAB (OUTPATIENT)
Dept: LAB | Facility: CLINIC | Age: 59
End: 2024-03-29
Payer: COMMERCIAL

## 2024-03-29 ENCOUNTER — ANTICOAGULATION THERAPY VISIT (OUTPATIENT)
Dept: ANTICOAGULATION | Facility: CLINIC | Age: 59
End: 2024-03-29

## 2024-03-29 DIAGNOSIS — Z86.718 PERSONAL HISTORY OF DVT (DEEP VEIN THROMBOSIS): Primary | ICD-10-CM

## 2024-03-29 DIAGNOSIS — Z86.718 PERSONAL HISTORY OF DVT (DEEP VEIN THROMBOSIS): ICD-10-CM

## 2024-03-29 LAB — INR BLD: 2.6 (ref 0.9–1.1)

## 2024-03-29 PROCEDURE — 85610 PROTHROMBIN TIME: CPT

## 2024-03-29 PROCEDURE — 36416 COLLJ CAPILLARY BLOOD SPEC: CPT

## 2024-03-29 NOTE — PROGRESS NOTES
ANTICOAGULATION MANAGEMENT     Renée Mcfadden 58 year old female is on warfarin with therapeutic INR result. (Goal INR 2.0-3.0)    Recent labs: (last 7 days)     03/29/24  1056   INR 2.6*       ASSESSMENT     Source(s): Chart Review and Patient/Caregiver Call     Warfarin doses taken: Warfarin taken as instructed  Diet: No new diet changes identified  Medication/supplement changes: None noted  New illness, injury, or hospitalization: No  Signs or symptoms of bleeding or clotting: No  Previous result: Therapeutic last visit; previously outside of goal range  Additional findings: None       PLAN     Recommended plan for no diet, medication or health factor changes affecting INR     Dosing Instructions: Continue your current warfarin dose with next INR in 5 weeks       Summary  As of 3/29/2024      Full warfarin instructions:  7.5 mg every Tue, Thu, Sat; 5 mg all other days   Next INR check:  5/3/2024               Telephone call with Geraldine who verbalizes understanding and agrees to plan    Lab visit scheduled    Education provided:   Goal range and lab monitoring: goal range and significance of current result    Plan made per [facility] anticoagulation protocol    Farrah Lopez RN  Anticoagulation Clinic  3/29/2024    _______________________________________________________________________     Anticoagulation Episode Summary       Current INR goal:  2.0-3.0   TTR:  61.3% (1 y)   Target end date:  Indefinite   Send INR reminders to:  DOUGIE RAVI    Indications    Personal history of DVT (deep vein thrombosis) [Z86.718]             Comments:               Anticoagulation Care Providers       Provider Role Specialty Phone number    Davidson Alegria MD Referring Family Medicine [phone]

## 2024-04-04 ENCOUNTER — MYC REFILL (OUTPATIENT)
Dept: ONCOLOGY | Facility: HOSPITAL | Age: 59
End: 2024-04-04
Payer: COMMERCIAL

## 2024-04-04 DIAGNOSIS — C50.412 MALIGNANT NEOPLASM OF UPPER-OUTER QUADRANT OF LEFT BREAST IN FEMALE, ESTROGEN RECEPTOR POSITIVE (H): ICD-10-CM

## 2024-04-04 DIAGNOSIS — Z17.0 MALIGNANT NEOPLASM OF UPPER-OUTER QUADRANT OF LEFT BREAST IN FEMALE, ESTROGEN RECEPTOR POSITIVE (H): ICD-10-CM

## 2024-04-05 RX ORDER — LETROZOLE 2.5 MG/1
2.5 TABLET, FILM COATED ORAL DAILY
Qty: 60 TABLET | Refills: 1 | Status: SHIPPED | OUTPATIENT
Start: 2024-04-05 | End: 2024-07-12

## 2024-04-12 ENCOUNTER — OFFICE VISIT (OUTPATIENT)
Dept: OBGYN | Facility: CLINIC | Age: 59
End: 2024-04-12
Payer: COMMERCIAL

## 2024-04-12 VITALS
SYSTOLIC BLOOD PRESSURE: 145 MMHG | BODY MASS INDEX: 28.27 KG/M2 | DIASTOLIC BLOOD PRESSURE: 83 MMHG | WEIGHT: 166 LBS | HEART RATE: 92 BPM | OXYGEN SATURATION: 96 %

## 2024-04-12 DIAGNOSIS — R87.610 ASCUS WITH POSITIVE HIGH RISK HPV CERVICAL: Primary | ICD-10-CM

## 2024-04-12 DIAGNOSIS — R87.810 ASCUS WITH POSITIVE HIGH RISK HPV CERVICAL: Primary | ICD-10-CM

## 2024-04-12 PROCEDURE — 57454 BX/CURETT OF CERVIX W/SCOPE: CPT | Performed by: OBSTETRICS & GYNECOLOGY

## 2024-04-12 PROCEDURE — 88305 TISSUE EXAM BY PATHOLOGIST: CPT | Performed by: PATHOLOGY

## 2024-04-12 NOTE — PROGRESS NOTES
Patient Name: Renée Mcfadden              Date: 2024   YOB: 1965                         Age: 58 year old   Phone: [phone] (home) [phone] (work)  ________________________________________________________________________  I have been asked to see Renée in consultation by GER Ochoa,  to discuss the pap smear, findings and possible further evaluation.   Renée is a 58 year old post menopausal female, , with the pap smear history as noted below.  She is new to the [facility] department.      10/15/08 ASCUS, +HR HPV (unknown strain)  08 Colpo bx and ECC neg  3/15/11 LSIL  3/28/11 Colpo bx HPV effect, ECC neg  12 LSIL  12 Colpo bx and ECC neg  Above per CE  Gap in care  11/10/17 LSIL pap, + HR HPV 16 and 18. Plan: colp bef 2/10/18  3/5/19 Lost to follow-up for pap tracking  23 ASCUS, +HR HPV 16. Plan: colposcopy due before 24 left message / mychart sent. Pt read results.  24 [place] appt  24 [place] appt     I attempted to ensure that the patient was educated regarding the nature of her findings and implications to date.  We reviewed the role of HPV, incidence in the population and the natural history of the infection, and its transmission.  We also reviewed ways to minimize her future risk, the effect of HPV on the cervix and treatment options available, should they be indicated.    The pathophysiology of the cervix, including a discussion of the squamous and columnar cells, metaplasia and dysplasia have been reviewed, drawings, sketches and the pamphlets were reviewed with her.        Past Medical History:   Diagnosis Date    Cancer (H) 2023    Depressive disorder     DVT (deep venous thrombosis) (H)     Left leg    Hypertension 2014    last 3-4 years    Papanicolaou smear of cervix with low grade squamous intraepithelial lesion (LGSIL) 11/10/2017    Seizure (H) Age 9    Dilantin prescribed by Dr. Dodge. D/c age  11 .    Smoker 08/27/2013       Past Surgical History:   Procedure Laterality Date    ABDOMEN SURGERY      late 80's-early 90's: Endometriosis    BIOPSY      abnormal paps    GYN SURGERY  1989    D and C    LUMPECTOMY BREAST Left 2/15/2023    Procedure: Left Lumpectomy after Wire Localization; Thompson Lymph Node Biopsy;  Surgeon: Belgica Tsang MD;  Location: [facility]    SCLEROTHERAPY Bilateral 2003    Both legs-Vericose veins        Outpatient Encounter Medications as of 4/12/2024   Medication Sig Dispense Refill    letrozole (FEMARA) 2.5 MG tablet Take 1 tablet (2.5 mg) by mouth daily 60 tablet 1    NIFEdipine ER OSMOTIC (PROCARDIA XL) 90 MG 24 hr tablet Take 1 tablet (90 mg) by mouth daily 90 tablet 1    triamcinolone (KENALOG) 0.1 % external ointment Apply topically 2 times daily To eczema when flared 80 g 1    warfarin ANTICOAGULANT (JANTOVEN ANTICOAGULANT) 5 MG tablet TAKE 1 AND 1/2 TABLETS ON Thursdays AND TAKE 1  TABLET ALL OTHER DAYS AS DIRECTED BY THE [facility] 96 tablet 1    fluconazole (DIFLUCAN) 150 MG tablet TAKE 1 TABLET ONCE FOR 1   DOSE. 1 tablet 1    sildenafil (REVATIO) 20 MG tablet Take 1 tablet by mouth daily (Patient not taking: Reported on 3/1/2024)       No facility-administered encounter medications on file as of 4/12/2024.        Allergies as of 04/12/2024    (No Known Allergies)       Social History     Socioeconomic History    Marital status:      Spouse name: None    Number of children: 0    Years of education: 13    Highest education level: None   Occupational History    Occupation:      Comment: [employer]   Tobacco Use    Smoking status: Every Day     Current packs/day: 0.50     Average packs/day: 0.5 packs/day for 45.3 years (22.6 ttl pk-yrs)     Types: Cigarettes     Start date: 1/1/1979    Smokeless tobacco: Never   Vaping Use    Vaping status: Never Used   Substance and Sexual Activity    Alcohol use: Yes     Comment: 3-4 beers weekly    Drug use: No    Sexual  activity: Not Currently     Partners: Male     Birth control/protection: Post-menopausal   Other Topics Concern    Parent/sibling w/ CABG, MI or angioplasty before 65F 55M? No   Social History Narrative    Works as a  of commodities (electronic components) for work.  Large travel burden.  Long days when on the road.       Social Determinants of Health     Financial Resource Strain: Low Risk  (11/28/2023)    Financial Resource Strain     Within the past 12 months, have you or your family members you live with been unable to get utilities (heat, electricity) when it was really needed?: No   Food Insecurity: Low Risk  (11/28/2023)    Food Insecurity     Within the past 12 months, did you worry that your food would run out before you got money to buy more?: No     Within the past 12 months, did the food you bought just not last and you didn t have money to get more?: No   Transportation Needs: Low Risk  (11/28/2023)    Transportation Needs     Within the past 12 months, has lack of transportation kept you from medical appointments, getting your medicines, non-medical meetings or appointments, work, or from getting things that you need?: No    Received from [facility], [facility]    Social Connections   Interpersonal Safety: Low Risk  (11/29/2023)    Interpersonal Safety     Do you feel physically and emotionally safe where you currently live?: Yes     Within the past 12 months, have you been hit, slapped, kicked or otherwise physically hurt by someone?: No     Within the past 12 months, have you been humiliated or emotionally abused in other ways by your partner or ex-partner?: No   Housing Stability: Low Risk  (11/28/2023)    Housing Stability     Do you have housing? : Yes     Are you worried about losing your housing?: No        Family History   Problem Relation Age of Onset    Hypertension Mother     Neurologic Disorder Mother 40        doing  fine now    Hyperlipidemia Mother     Anesthesia Reaction Mother     Asthma Mother     Skin Cancer Mother     Cancer Father          age 70+,  details unknown    Cerebrovascular Disease Maternal Grandmother 85    Osteoporosis Other          Review Of Systems  10 point ROS of systems including Constitutional, Eyes, Respiratory, Cardiovascular, Gastroenterology, Genitourinary, Integumentary, Muscularskeletal, Psychiatric were all negative except for pertinent positives noted in my HPI and in the PMH.      Exam:   BP (!) 145/83 (BP Location: Right arm, Patient Position: Chair, Cuff Size: Adult Regular)   Pulse 92   Wt 75.3 kg (166 lb)   SpO2 96%   Breastfeeding No   BMI 28.27 kg/m    GENERAL:  WNWD female NAD  HEENT: NC/AT, EOMI  Lungs:  Good respiratory effort   SKIN: normal skin turgor  GAIT: Normal  NECK: Symmetrical, no masses noted   VULVA: Normal Genitalia  BUS: Normal  URETHRA:  No hypermobility noted  URETHRAL MEATUS:  No masses noted  VAGINA: Normal mucosa, no discharge  CERVIX: Closed, mobile, no discharge  PERIANAL:  No masses or lesions seen  EXTREMITIES: no clubbing, cyanosis, or edema      Assessment:  ASCUS pap smear  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        Cruz Byrnes MD        Procedure:  Procedure for colposcopy and biopsy has been explained to the patient and consent obtained.    Before the procedure, it was ensured that the patient was educated regarding the nature of her findings and implications to date.  We reviewed the role of HPV and the natural history of the infection.  We also reviewed ways to minimize her future risk, the effect of HPV on the cervix and treatment options available, should they be indicated.    The pathophysiology of the cervix, including a discussion of the squamous and columnar cells, metaplasia and dysplasia  have been reviewed, drawings, sketches and the pamphlets were reviewed with her.  The details of the colposcopic procedure were reviewed, the risks of missed diagnoses, pain, infection, and bleeding.  Questions seemed to be answered before proceeding and the patient then consented to the procedure.     Speculum placed in vagina and excellent visualization of cervix achieved, cervix swabbed  with acetic acid solution.    biopsies taken (including ECC): 3  Hemostasis effected with Silver Nitrate.     Findings:    Cervix: no AWE seen.  Polypoid like tissue seen on the anterior and right, extending anteriorly to the anterior fornix   Vaginal inspection: no visible lesions.  Procedure Summary: Patient tolerated procedure well.      Assessment:   ASCUS pap smear  HR HPV of cervix       Plan:  Specimens labelled and sent to pathology.  Will base further treatment on pathology findings.  Post biopsy instructions given to patient and call to discuss Pathology results.    Cruz Byrnes MD

## 2024-04-12 NOTE — PATIENT INSTRUCTIONS
If you have any questions regarding your visit, Please contact your care team.    To Schedule an Appointment 24/7  Call: [phone]  [facility]  TELEPHONE NUMBER   Cruz Byrnes M.D.    Teresa-Medical Assistant    Cecelia Hernandez-Surgery Scheduler  Sarahy- Tuesday-[place]                   7:30 a.m.-3:30 p.m.  Wednesday-[place]             8:00 a.m.-4:30 p.m.  Thursday-[place]     8:00 a.m.-4:00 p.m.  Friday-[place]                       7:30 a.m.-1:00 p.m. [facility]  [address] [place] [address].  [place] [address] [phone] Phone  [phone] Fax    Imaging Scheduling all locations  [phone]      [facility]  [address]  [place], [place] [address] [phone]    [facility]  [address] [place] [address] [phone] ask for Women's Clinic     **Surgeries** Our Surgery Schedulers will contact you to schedule. If you do not receive a call within 3 business days, please call [phone].    Urgent Care locations:  [facility] Monday-Friday                 10 am - 8 pm  Saturday and Sunday        9 am - 5 pm   [phone]   If you need a medication refill, please contact your pharmacy. Please allow 3 business days for your refill to be completed.  As always, Thank you for trusting us with your healthcare needs!  If you have any questions regarding your visit, Please contact your care team.    [employer] [facility]: [phone]    To Schedule an Appointment 24/7  Call: [phone]    see additional instructions from your care team below

## 2024-04-17 LAB
PATH REPORT.COMMENTS IMP SPEC: NORMAL
PATH REPORT.FINAL DX SPEC: NORMAL
PATH REPORT.GROSS SPEC: NORMAL
PATH REPORT.MICROSCOPIC SPEC OTHER STN: NORMAL
PATH REPORT.RELEVANT HX SPEC: NORMAL
PHOTO IMAGE: NORMAL

## 2024-04-18 ENCOUNTER — PATIENT OUTREACH (OUTPATIENT)
Dept: OBGYN | Facility: CLINIC | Age: 59
End: 2024-04-18
Payer: COMMERCIAL

## 2024-05-03 ENCOUNTER — LAB (OUTPATIENT)
Dept: LAB | Facility: CLINIC | Age: 59
End: 2024-05-03
Payer: COMMERCIAL

## 2024-05-03 ENCOUNTER — ANTICOAGULATION THERAPY VISIT (OUTPATIENT)
Dept: ANTICOAGULATION | Facility: CLINIC | Age: 59
End: 2024-05-03

## 2024-05-03 DIAGNOSIS — Z86.718 PERSONAL HISTORY OF DVT (DEEP VEIN THROMBOSIS): ICD-10-CM

## 2024-05-03 DIAGNOSIS — Z86.718 PERSONAL HISTORY OF DVT (DEEP VEIN THROMBOSIS): Primary | ICD-10-CM

## 2024-05-03 LAB — INR BLD: 1.8 (ref 0.9–1.1)

## 2024-05-03 PROCEDURE — 85610 PROTHROMBIN TIME: CPT

## 2024-05-03 PROCEDURE — 36415 COLL VENOUS BLD VENIPUNCTURE: CPT

## 2024-05-03 NOTE — PROGRESS NOTES
ANTICOAGULATION MANAGEMENT     Renée Mcfadden 58 year old female is on warfarin with subtherapeutic INR result. (Goal INR 2.0-3.0)    Recent labs: (last 7 days)     05/03/24  0846   INR 1.8*       ASSESSMENT     Source(s): Chart Review and Patient/Caregiver Call     Warfarin doses taken: Missed dose(s) may be affecting INR  Diet: No new diet changes identified  Medication/supplement changes: None noted  New illness, injury, or hospitalization: No  Signs or symptoms of bleeding or clotting: No  Previous result: Therapeutic last 2(+) visits  Additional findings: advised to recheck in 1-2 weeks but patient opted to check in 3 weeks instead       PLAN     Recommended plan for temporary change(s) affecting INR     Dosing Instructions: booster dose then continue your current warfarin dose with next INR in 3 weeks       Summary  As of 5/3/2024      Full warfarin instructions:  5/3: 10 mg; Otherwise 7.5 mg every Tue, Thu, Sat; 5 mg all other days   Next INR check:  5/24/2024               Telephone call with Geraldine who verbalizes understanding and agrees to plan    Lab visit scheduled    Education provided:   Please call back if any changes to your diet, medications or how you've been taking warfarin  Contact [phone]  with any changes, questions or concerns.     Plan made per [facility] anticoagulation protocol    Edilia Dubois RN  Anticoagulation Clinic  5/3/2024    _______________________________________________________________________     Anticoagulation Episode Summary       Current INR goal:  2.0-3.0   TTR:  58.9% (1 y)   Target end date:  Indefinite   Send INR reminders to:  DOUGIE RAVI    Indications    Personal history of DVT (deep vein thrombosis) [Z86.458]             Comments:               Anticoagulation Care Providers       Provider Role Specialty Phone number    Davidson Alegria MD Referring Family Medicine [phone]

## 2024-05-24 ENCOUNTER — LAB (OUTPATIENT)
Dept: LAB | Facility: CLINIC | Age: 59
End: 2024-05-24
Payer: COMMERCIAL

## 2024-05-24 ENCOUNTER — ANTICOAGULATION THERAPY VISIT (OUTPATIENT)
Dept: ANTICOAGULATION | Facility: CLINIC | Age: 59
End: 2024-05-24

## 2024-05-24 DIAGNOSIS — Z86.718 PERSONAL HISTORY OF DVT (DEEP VEIN THROMBOSIS): Primary | ICD-10-CM

## 2024-05-24 DIAGNOSIS — Z86.718 PERSONAL HISTORY OF DVT (DEEP VEIN THROMBOSIS): ICD-10-CM

## 2024-05-24 LAB — INR BLD: 2.5 (ref 0.9–1.1)

## 2024-05-24 PROCEDURE — 85610 PROTHROMBIN TIME: CPT

## 2024-05-24 PROCEDURE — 36415 COLL VENOUS BLD VENIPUNCTURE: CPT

## 2024-05-24 NOTE — PROGRESS NOTES
ANTICOAGULATION MANAGEMENT     Renée Mcfadden 58 year old female is on warfarin with therapeutic INR result. (Goal INR 2.0-3.0)    Recent labs: (last 7 days)     05/24/24  0828   INR 2.5*       ASSESSMENT     Source(s): Chart Review and Patient/Caregiver Call     Warfarin doses taken: Warfarin taken as instructed  Diet: No new diet changes identified  Medication/supplement changes: None noted  New illness, injury, or hospitalization: No  Signs or symptoms of bleeding or clotting: No  Previous result: Subtherapeutic  Additional findings: None     PLAN     Recommended plan for no diet, medication or health factor changes affecting INR     Dosing Instructions: Continue your current warfarin dose with next INR in 3 weeks       Summary  As of 5/24/2024      Full warfarin instructions:  7.5 mg every Tue, Thu, Sat; 5 mg all other days   Next INR check:  6/14/2024               Telephone call with Geraldine who verbalizes understanding and agrees to plan    Lab visit scheduled    Education provided:   Please call back if any changes to your diet, medications or how you've been taking warfarin    Plan made per [facility] anticoagulation protocol    Ethel Adams RN  Anticoagulation Clinic  5/24/2024    _______________________________________________________________________     Anticoagulation Episode Summary       Current INR goal:  2.0-3.0   TTR:  59.4% (1 y)   Target end date:  Indefinite   Send INR reminders to:  DOUGIE RAVI    Indications    Personal history of DVT (deep vein thrombosis) [Z86.718]             Comments:               Anticoagulation Care Providers       Provider Role Specialty Phone number    Davidson lAegria MD Referring Family Medicine [phone]

## 2024-06-14 ENCOUNTER — LAB (OUTPATIENT)
Dept: LAB | Facility: CLINIC | Age: 59
End: 2024-06-14
Payer: COMMERCIAL

## 2024-06-14 ENCOUNTER — ANTICOAGULATION THERAPY VISIT (OUTPATIENT)
Dept: ANTICOAGULATION | Facility: CLINIC | Age: 59
End: 2024-06-14

## 2024-06-14 DIAGNOSIS — Z86.718 PERSONAL HISTORY OF DVT (DEEP VEIN THROMBOSIS): Primary | ICD-10-CM

## 2024-06-14 DIAGNOSIS — Z86.718 PERSONAL HISTORY OF DVT (DEEP VEIN THROMBOSIS): ICD-10-CM

## 2024-06-14 LAB — INR BLD: 1.9 (ref 0.9–1.1)

## 2024-06-14 PROCEDURE — 36415 COLL VENOUS BLD VENIPUNCTURE: CPT

## 2024-06-14 PROCEDURE — 85610 PROTHROMBIN TIME: CPT

## 2024-06-14 NOTE — PROGRESS NOTES
ANTICOAGULATION MANAGEMENT     Renée Mcfadden 58 year old female is on warfarin with subtherapeutic INR result. (Goal INR 2.0-3.0)    Recent labs: (last 7 days)     06/14/24  0849   INR 1.9*       ASSESSMENT     Source(s): Chart Review and Patient/Caregiver Call     Warfarin doses taken: Missed dose a couple days ago, may be affecting INR   Reported she took 7.5mg the next day, instead of 5mg dose.  Diet: No new diet changes identified  Medication/supplement changes: None noted  New illness, injury, or hospitalization: No  Signs or symptoms of bleeding or clotting: No  Previous result: Therapeutic last visit at 2.5; previously outside of goal range at 1.8  Additional findings: None       PLAN     Recommended plan for temporary change(s) affecting INR     Dosing Instructions: Continue your current warfarin dose with next INR in 2 weeks       Summary  As of 6/14/2024      Full warfarin instructions:  7.5 mg every Tue, Thu, Sat; 5 mg all other days   Next INR check:  6/28/2024               Telephone call with Geraldine who verbalizes understanding and agrees to plan    Lab visit scheduled - INR On 6/28/24 @ [place]     Education provided:   Taking warfarin: take warfarin at same time each day; preferably in the evening and Importance of taking warfarin as instructed  Goal range and lab monitoring: goal range and significance of current result    Plan made per [facility] anticoagulation protocol    Rebecca Wilkins RN  Anticoagulation Clinic  6/14/2024    _______________________________________________________________________     Anticoagulation Episode Summary       Current INR goal:  2.0-3.0   TTR:  64.2% (1 y)   Target end date:  Indefinite   Send INR reminders to:  DOUGIE RAVI    Indications    Personal history of DVT (deep vein thrombosis) [Z86.718]             Comments:               Anticoagulation Care Providers       Provider Role Specialty Phone number    Davidson Alegria MD Referring Family Medicine  [phone]

## 2024-06-28 ENCOUNTER — ANTICOAGULATION THERAPY VISIT (OUTPATIENT)
Dept: ANTICOAGULATION | Facility: CLINIC | Age: 59
End: 2024-06-28

## 2024-06-28 ENCOUNTER — LAB (OUTPATIENT)
Dept: LAB | Facility: CLINIC | Age: 59
End: 2024-06-28
Payer: COMMERCIAL

## 2024-06-28 DIAGNOSIS — Z86.718 PERSONAL HISTORY OF DVT (DEEP VEIN THROMBOSIS): ICD-10-CM

## 2024-06-28 DIAGNOSIS — Z86.718 PERSONAL HISTORY OF DVT (DEEP VEIN THROMBOSIS): Primary | ICD-10-CM

## 2024-06-28 LAB — INR BLD: 2 (ref 0.9–1.1)

## 2024-06-28 PROCEDURE — 85610 PROTHROMBIN TIME: CPT

## 2024-06-28 PROCEDURE — 36415 COLL VENOUS BLD VENIPUNCTURE: CPT

## 2024-06-28 NOTE — PROGRESS NOTES
ANTICOAGULATION MANAGEMENT     Renée Mcfadden 58 year old female is on warfarin with therapeutic INR result. (Goal INR 2.0-3.0)    Recent labs: (last 7 days)     06/28/24  0747   INR 2.0*       ASSESSMENT     Source(s): Chart Review and Patient/Caregiver Call     Warfarin doses taken: Missed dose(s) may be affecting INR  Diet: No new diet changes identified  Medication/supplement changes: None noted  New illness, injury, or hospitalization: No  Signs or symptoms of bleeding or clotting: No  Previous result: Subtherapeutic  Additional findings: None       PLAN     Recommended plan for temporary change(s) affecting INR     Dosing Instructions: booster dose then continue your current warfarin dose with next INR in 2 weeks       Summary  As of 6/28/2024      Full warfarin instructions:  6/28: 12.5 mg; Otherwise 7.5 mg every Tue, Thu, Sat; 5 mg all other days   Next INR check:  7/12/2024               Telephone call with Geraldine who verbalizes understanding and agrees to plan    Check at provider office visit    Education provided: Please call back if any changes to your diet, medications or how you've been taking warfarin  Contact [phone] with any changes, questions or concerns.     Plan made per [facility] anticoagulation protocol    Edilia Dubois RN  Anticoagulation Clinic  6/28/2024    _______________________________________________________________________     Anticoagulation Episode Summary       Current INR goal:  2.0-3.0   TTR:  64.2% (1 y)   Target end date:  Indefinite   Send INR reminders to:  DOUGIE RAVI    Indications    Personal history of DVT (deep vein thrombosis) [Z86.718]             Comments:               Anticoagulation Care Providers       Provider Role Specialty Phone number    Davidson Alegria MD Referring Family Medicine [phone]              
Dr. Coreas

## 2024-07-12 ENCOUNTER — LAB (OUTPATIENT)
Dept: LAB | Facility: HOSPITAL | Age: 59
End: 2024-07-12
Attending: INTERNAL MEDICINE
Payer: COMMERCIAL

## 2024-07-12 ENCOUNTER — PATIENT OUTREACH (OUTPATIENT)
Dept: ONCOLOGY | Facility: HOSPITAL | Age: 59
End: 2024-07-12

## 2024-07-12 ENCOUNTER — ONCOLOGY VISIT (OUTPATIENT)
Dept: ONCOLOGY | Facility: HOSPITAL | Age: 59
End: 2024-07-12
Attending: INTERNAL MEDICINE
Payer: COMMERCIAL

## 2024-07-12 ENCOUNTER — ANTICOAGULATION THERAPY VISIT (OUTPATIENT)
Dept: ANTICOAGULATION | Facility: CLINIC | Age: 59
End: 2024-07-12

## 2024-07-12 VITALS
RESPIRATION RATE: 16 BRPM | HEIGHT: 64 IN | HEART RATE: 83 BPM | DIASTOLIC BLOOD PRESSURE: 69 MMHG | BODY MASS INDEX: 28.34 KG/M2 | SYSTOLIC BLOOD PRESSURE: 125 MMHG | OXYGEN SATURATION: 98 % | WEIGHT: 166 LBS | TEMPERATURE: 98.8 F

## 2024-07-12 DIAGNOSIS — Z86.718 PERSONAL HISTORY OF DVT (DEEP VEIN THROMBOSIS): Primary | ICD-10-CM

## 2024-07-12 DIAGNOSIS — Z12.31 ENCOUNTER FOR SCREENING MAMMOGRAM FOR BREAST CANCER: Primary | ICD-10-CM

## 2024-07-12 DIAGNOSIS — C50.412 MALIGNANT NEOPLASM OF UPPER-OUTER QUADRANT OF LEFT BREAST IN FEMALE, ESTROGEN RECEPTOR POSITIVE (H): ICD-10-CM

## 2024-07-12 DIAGNOSIS — Z86.718 PERSONAL HISTORY OF VENOUS THROMBOSIS AND EMBOLISM: Primary | ICD-10-CM

## 2024-07-12 DIAGNOSIS — Z17.0 MALIGNANT NEOPLASM OF UPPER-OUTER QUADRANT OF LEFT BREAST IN FEMALE, ESTROGEN RECEPTOR POSITIVE (H): ICD-10-CM

## 2024-07-12 LAB — INR PPP: 1.93 (ref 0.85–1.15)

## 2024-07-12 PROCEDURE — 36415 COLL VENOUS BLD VENIPUNCTURE: CPT

## 2024-07-12 PROCEDURE — G2211 COMPLEX E/M VISIT ADD ON: HCPCS | Performed by: INTERNAL MEDICINE

## 2024-07-12 PROCEDURE — 99213 OFFICE O/P EST LOW 20 MIN: CPT | Performed by: INTERNAL MEDICINE

## 2024-07-12 PROCEDURE — 85610 PROTHROMBIN TIME: CPT

## 2024-07-12 RX ORDER — IBUPROFEN 200 MG
1 CAPSULE ORAL 2 TIMES DAILY
COMMUNITY

## 2024-07-12 RX ORDER — LETROZOLE 2.5 MG/1
2.5 TABLET, FILM COATED ORAL DAILY
Qty: 90 TABLET | Refills: 3 | Status: SHIPPED | OUTPATIENT
Start: 2024-07-12 | End: 2024-08-19

## 2024-07-12 ASSESSMENT — PAIN SCALES - GENERAL: PAINLEVEL: NO PAIN (0)

## 2024-07-12 NOTE — PROGRESS NOTES
[facility] Hematology and Oncology Progress Note    Patient: Renée Mcfadden  MRN: [id number]  Date of Service: 04/07/2023        Reason for Visit    Chief Complaint   Patient presents with    Oncology Clinic Visit     4 month follow up         Problem List Items Addressed This Visit       Malignant neoplasm of upper-outer quadrant of left breast in female, estrogen receptor positive (H)    Relevant Medications    letrozole (FEMARA) 2.5 MG tablet     Other Visit Diagnoses       Encounter for screening mammogram for breast cancer    -  Primary    Relevant Orders    MA Screen Bilateral w/Gonsalo                    Assessment and Plan  Early stage left-sided breast cancer hormone receptor positive and HER2 negative  Status postlumpectomy, pT1b, pN 0, cM0  Oncotype DX score 20  Adjuvant radiation was not considered  Doing well on letrozole.  No significant issues with the treatment.  No major side effects.  Some hot flashes.  Clinical exam today showed no evidence of bilateral axilla adenopathy.  Previously I had felt a lump in her left axilla which was not seen on the diagnostic mammogram or ultrasound.  I can still feel the same lump in the left axilla.  Not sure if its lymph node or postsurgical change.  Will keep an eye on it as long as it is stable.  She is due for a mammogram this December.  I will see her after that.    The etiology of her urinary incontinence is not clear.  Looks like things have subsided today.  No overt signs of infection.  If it were to recur then would recommend getting a UA.  She is agreeable to the plan.       Cancer Staging   Malignant neoplasm of upper-outer quadrant of left breast in female, estrogen receptor positive (H)  Staging form: Breast, AJCC 8th Edition  - Pathologic: Stage IA (pT1b, pN0, cM0, G1, ER+, NH+, HER2-, Oncotype DX score: 20) - Signed by Jorge Guevara MD on 3/1/2024      ECOG Performance    0 - Independent         Problem List    Patient Active Problem  List   Diagnosis    Smoker    Benign essential hypertension    Raynaud's disease without gangrene    LSIL on pap with positive HPV # 16.    Deep vein thrombosis (DVT) of proximal lower extremity, unspecified chronicity, unspecified laterality (H)    Personal history of DVT (deep vein thrombosis)    Anticardiolipin antibody positive    Mild aortic stenosis    Mild mitral stenosis    Malignant neoplasm of upper-outer quadrant of left breast in female, estrogen receptor positive (H)        Oncology history  Screening mammogram showed a focal asymmetry in the left upper breast at posterior depth. Ultrasound showed a 6 x 4 x 5 mm hypoechoic mass with microlobulated margins at 2 o'clock position the left breast about 8 cm from the nipple. Needle biopsy showed invasive ductal carcinoma, grade 1, with background of DCIS, strongly ER positive, DC negative and HER2 negative by IHC. No evidence of abnormal lymph nodes in the left axilla.     Underwent lumpectomy on 2/15/2023    The tumor measured 10 x 7 x 5 mm in size.  1 sentinel lymph node was removed which was negative for malignancy.  All the margins were negative.  pT1b, pN0, cM0.  Oncotype DX score came back at 20 which predicts no real benefit from adjuvant chemotherapy.      With a history of limited scleroderma and due to the fact that she had wide surgical margins, adjuvant radiation was not considered.    Started adjuvant endocrine therapy with letrozole from April 2023.    Interval History   Renée Mcfadden is a 57 year old female with a recent history of early-stage left-sided breast cancer status postlumpectomy who is currently on letrozole for adjuvant endocrine therapy here for follow-up.    Doing well on letrozole.  No significant side effects.  She did have some increased urinary frequency and incontinence yesterday.  Had to go multiple times.  No burning sensation.  No pain.  No fevers.  Symptoms have subsided today.  No blood in urine.  Denies any new  lumps or bumps.        Review of Systems  A comprehensive review of systems was negative except for what is noted in the interval history    Current Outpatient Medications   Medication Sig Dispense Refill    calcium citrate (CITRACAL) 950 (200 Ca) MG tablet Take 1 tablet by mouth 2 times daily      letrozole (FEMARA) 2.5 MG tablet Take 1 tablet (2.5 mg) by mouth daily 90 tablet 3    NIFEdipine ER OSMOTIC (PROCARDIA XL) 90 MG 24 hr tablet Take 1 tablet (90 mg) by mouth daily 90 tablet 1    triamcinolone (KENALOG) 0.1 % external ointment Apply topically 2 times daily To eczema when flared 80 g 1    warfarin ANTICOAGULANT (JANTOVEN ANTICOAGULANT) 5 MG tablet TAKE 1 AND 1/2 TABLETS ON                THURSDAYS AND 1 TABLET ALL              OTHER DAYS AS DIRECTED BY                 THE ANTICOAGULATION CLINIC 96 tablet 1    fluconazole (DIFLUCAN) 150 MG tablet TAKE 1 TABLET ONCE FOR 1   DOSE. (Patient not taking: Reported on 7/12/2024) 1 tablet 1    sildenafil (REVATIO) 20 MG tablet Take 1 tablet by mouth daily (Patient not taking: Reported on 3/1/2024)       No current facility-administered medications for this visit.        Physical Exam    Failed to redirect to the Timeline version of the REV SmartLink.    General: alert and cooperative  HEENT: Head: Normal, normocephalic, atraumatic.  Eye: Normal external eye, conjunctiva, lids cornea, CASSIUS.  Breast: No obvious evidence of bilateral axilla adenopathy.  Questionable postsurgical changes in the left axillary tail area.  This was evaluated with ultrasound previously and there was no abnormality there.  CNS: Alert and oriented x3, neurologic exam grossly normal.      Lab Results    No results found for this or any previous visit (from the past 168 hour(s)).        Imaging    No results found.    The longitudinal plan of care for the diagnosis(es)/condition(s) as documented were addressed during this visit. Due to the added complexity in care, I will continue to support  Geraldine in the subsequent management and with ongoing continuity of care.    Signed by: Jorge Guevara MD

## 2024-07-12 NOTE — PROGRESS NOTES
Situation: Patient chart reviewed by care coordinator.    Background: Pt in clinic today to follow up with Dr. Guevara    Assessment: Stable on Letrozole.    Plan/Recommendations: Will return in 6 months with mammo prior.     Margie Green RNCC

## 2024-07-12 NOTE — PROGRESS NOTES
"Oncology Rooming Note    July 12, 2024 9:29 AM   Renée Mcfadden is a 58 year old female who presents for:    Chief Complaint   Patient presents with    Oncology Clinic Visit     4 month follow up     Initial Vitals: /69 (BP Location: Left arm, Patient Position: Sitting, Cuff Size: Adult Regular)   Pulse 83   Temp 98.8  F (37.1  C) (Tympanic)   Resp 16   Ht 1.632 m (5' 4.25\")   Wt 75.3 kg (166 lb)   SpO2 98%   BMI 28.27 kg/m   Estimated body mass index is 28.27 kg/m  as calculated from the following:    Height as of this encounter: 1.632 m (5' 4.25\").    Weight as of this encounter: 75.3 kg (166 lb). Body surface area is 1.85 meters squared.  No Pain (0) Comment: Data Unavailable   No LMP recorded. Patient is postmenopausal.  Allergies reviewed: Yes  Medications reviewed: Yes    Medications: Medication refills not needed today.  Pharmacy name entered into [employer]:    [facility] - ONE [facility] AT PORTAL TO REGISTERED [facility] SITES  [id number] - [place], [place] - [address] AT CORNER OF [facility]    Frailty Screening:   Is the patient here for a new oncology consult visit in cancer care? 2. No      Clinical concerns: pt having urinary symptoms. Would like a urine sample today.   Dr. Guevara was notified.      BALJINDER QUINONES CMA            "

## 2024-07-12 NOTE — LETTER
"7/12/2024      Renée Mcfadden  [address] [facility] [address]      Dear Colleague,    Thank you for referring your patient, Renée Mcfadden, to the [facility] CANCER [facility]. Please see a copy of my visit note below.    Oncology Rooming Note    July 12, 2024 9:29 AM   Renée Mcfadden is a 58 year old female who presents for:    Chief Complaint   Patient presents with     Oncology Clinic Visit     4 month follow up     Initial Vitals: /69 (BP Location: Left arm, Patient Position: Sitting, Cuff Size: Adult Regular)   Pulse 83   Temp 98.8  F (37.1  C) (Tympanic)   Resp 16   Ht 1.632 m (5' 4.25\")   Wt 75.3 kg (166 lb)   SpO2 98%   BMI 28.27 kg/m   Estimated body mass index is 28.27 kg/m  as calculated from the following:    Height as of this encounter: 1.632 m (5' 4.25\").    Weight as of this encounter: 75.3 kg (166 lb). Body surface area is 1.85 meters squared.  No Pain (0) Comment: Data Unavailable   No LMP recorded. Patient is postmenopausal.  Allergies reviewed: Yes  Medications reviewed: Yes    Medications: Medication refills not needed today.  Pharmacy name entered into PROFIC[employer]:    [facility] - ONE [facility] AT PORTAL TO REGISTERED [facility] SITES  [id number] - [place], [place] - [address]    Frailty Screening:   Is the patient here for a new oncology consult visit in cancer care? 2. No      Clinical concerns: pt having urinary symptoms. Would like a urine sample today.   Dr. Guevara was notified.      BALJINDER QUINONES CMA              [facility] Progress Note    Patient: Renée Mcfadden  MRN: [id number]  Date of Service: 04/07/2023        Reason for Visit    Chief Complaint   Patient presents with     Oncology Clinic Visit     4 month follow up         Problem List Items Addressed This Visit       Malignant neoplasm of upper-outer quadrant of left " breast in female, estrogen receptor positive (H)    Relevant Medications    letrozole (FEMARA) 2.5 MG tablet     Other Visit Diagnoses       Encounter for screening mammogram for breast cancer    -  Primary    Relevant Orders    MA Screen Bilateral w/Gonsalo                    Assessment and Plan  Early stage left-sided breast cancer hormone receptor positive and HER2 negative  Status postlumpectomy, pT1b, pN 0, cM0  Oncotype DX score 20  Adjuvant radiation was not considered  Doing well on letrozole.  No significant issues with the treatment.  No major side effects.  Some hot flashes.  Clinical exam today showed no evidence of bilateral axilla adenopathy.  Previously I had felt a lump in her left axilla which was not seen on the diagnostic mammogram or ultrasound.  I can still feel the same lump in the left axilla.  Not sure if its lymph node or postsurgical change.  Will keep an eye on it as long as it is stable.  She is due for a mammogram this December.  I will see her after that.    The etiology of her urinary incontinence is not clear.  Looks like things have subsided today.  No overt signs of infection.  If it were to recur then would recommend getting a UA.  She is agreeable to the plan.       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     Deep vein thrombosis (DVT) of proximal lower extremity, unspecified chronicity, unspecified laterality (H)     Personal history of DVT (deep vein thrombosis)     Anticardiolipin antibody positive     Mild  aortic stenosis     Mild mitral stenosis     Malignant neoplasm of upper-outer quadrant of left breast in female, estrogen receptor positive (H)        Oncology history  Screening mammogram showed a focal asymmetry in the left upper breast at posterior depth. Ultrasound showed a 6 x 4 x 5 mm hypoechoic mass with microlobulated margins at 2 o'clock position the left breast about 8 cm from the nipple. Needle biopsy showed invasive ductal carcinoma, grade 1, with background of DCIS, strongly ER positive, CA negative and HER2 negative by IHC. No evidence of abnormal lymph nodes in the left axilla.     Underwent lumpectomy on 2/15/2023    The tumor measured 10 x 7 x 5 mm in size.  1 sentinel lymph node was removed which was negative for malignancy.  All the margins were negative.  pT1b, pN0, cM0.  Oncotype DX score came back at 20 which predicts no real benefit from adjuvant chemotherapy.      With a history of limited scleroderma and due to the fact that she had wide surgical margins, adjuvant radiation was not considered.    Started adjuvant endocrine therapy with letrozole from April 2023.    Interval History   Renée Mcfadden is a 57 year old female with a recent history of early-stage left-sided breast cancer status postlumpectomy who is currently on letrozole for adjuvant endocrine therapy here for follow-up.    Doing well on letrozole.  No significant side effects.  She did have some increased urinary frequency and incontinence yesterday.  Had to go multiple times.  No burning sensation.  No pain.  No fevers.  Symptoms have subsided today.  No blood in urine.  Denies any new lumps or bumps.        Review of Systems  A comprehensive review of systems was negative except for what is noted in the interval history    Current Outpatient Medications   Medication Sig Dispense Refill     calcium citrate (CITRACAL) 950 (200 Ca) MG tablet Take 1 tablet by mouth 2 times daily       letrozole (FEMARA) 2.5 MG tablet Take 1  tablet (2.5 mg) by mouth daily 90 tablet 3     NIFEdipine ER OSMOTIC (PROCARDIA XL) 90 MG 24 hr tablet Take 1 tablet (90 mg) by mouth daily 90 tablet 1     triamcinolone (KENALOG) 0.1 % external ointment Apply topically 2 times daily To eczema when flared 80 g 1     warfarin ANTICOAGULANT (JANTOVEN ANTICOAGULANT) 5 MG tablet TAKE 1 AND 1/2 TABLETS ON                THURSDAYS AND 1 TABLET ALL              OTHER DAYS AS DIRECTED BY                 THE ANTICOAGULATION CLINIC 96 tablet 1     fluconazole (DIFLUCAN) 150 MG tablet TAKE 1 TABLET ONCE FOR 1   DOSE. (Patient not taking: Reported on 7/12/2024) 1 tablet 1     sildenafil (REVATIO) 20 MG tablet Take 1 tablet by mouth daily (Patient not taking: Reported on 3/1/2024)       No current facility-administered medications for this visit.        Physical Exam    Failed to redirect to the Timeline version of the [facility] SmartLink.    General: alert and cooperative  HEENT: Head: Normal, normocephalic, atraumatic.  Eye: Normal external eye, conjunctiva, lids cornea, CASSIUS.  Breast: No obvious evidence of bilateral axilla adenopathy.  Questionable postsurgical changes in the left axillary tail area.  This was evaluated with ultrasound previously and there was no abnormality there.  CNS: Alert and oriented x3, neurologic exam grossly normal.      Lab Results    No results found for this or any previous visit (from the past 168 hour(s)).        Imaging    No results found.    The longitudinal plan of care for the diagnosis(es)/condition(s) as documented were addressed during this visit. Due to the added complexity in care, I will continue to support Geraldine in the subsequent management and with ongoing continuity of care.    Signed by: Jogre Guevara MD      Again, thank you for allowing me to participate in the care of your patient.        Sincerely,        Jorge Guevara MD

## 2024-07-12 NOTE — PROGRESS NOTES
ANTICOAGULATION MANAGEMENT     Renée Mcfadden 58 year old female is on warfarin with subtherapeutic INR result. (Goal INR 2.0-3.0)    Recent labs: (last 7 days)     07/12/24  1016   INR 1.93*       ASSESSMENT     Source(s): Chart Review  Previous INR was Therapeutic last visit; previously outside of goal range  Medication, diet, health changes since last INR chart reviewed; none identified         PLAN     Recommended plan for no diet, medication or health factor changes affecting INR     Dosing Instructions: booster dose then continue your current warfarin dose with next INR in 2 weeks       Summary  As of 7/12/2024      Full warfarin instructions:  7/12: 10 mg; Otherwise 7.5 mg every Tue, Thu, Sat; 5 mg all other days   Next INR check:  7/26/2024               Sent [employer] App message with dosing and follow up instructions    Contact [phone] to schedule and with any changes, questions or concerns.     Education provided: Contact [phone] with any changes, questions or concerns.     Plan made per [facility] anticoagulation protocol    Ambar MCKEON RN  Anticoagulation Clinic  7/12/2024    _______________________________________________________________________     Anticoagulation Episode Summary       Current INR goal:  2.0-3.0   TTR:  62.8% (1 y)   Target end date:  Indefinite   Send INR reminders to:  DOUGIE RAVI    Indications    Personal history of DVT (deep vein thrombosis) [Z86.718]             Comments:               Anticoagulation Care Providers       Provider Role Specialty Phone number    Davidson Alegria MD Referring Family Medicine [phone]          ANTICOAGULATION MANAGEMENT     Renée Mcfadden 58 year old female is on warfarin with subtherapeutic INR result. (Goal INR 2.0-3.0)    Recent labs: (last 7 days)     07/12/24  1016   INR 1.93*       ASSESSMENT     Source(s): Chart Review  Previous INR was Therapeutic last visit; previously outside of goal range  Medication, diet, health changes  since last INR chart reviewed; none identified         PLAN     Unable to reach [facility] today.    No instructions provided. Unable to leave voicemail.    Follow up required to confirm warfarin dose taken and assess for changes    Ambar MCKEON RN  Anticoagulation Clinic  7/12/2024

## 2024-07-29 ENCOUNTER — MYC MEDICAL ADVICE (OUTPATIENT)
Dept: ANTICOAGULATION | Facility: CLINIC | Age: 59
End: 2024-07-29
Payer: COMMERCIAL

## 2024-08-05 ENCOUNTER — TELEPHONE (OUTPATIENT)
Dept: ANTICOAGULATION | Facility: CLINIC | Age: 59
End: 2024-08-05
Payer: COMMERCIAL

## 2024-08-05 NOTE — LETTER
[facility] ANTICOAGULATION CLINIC  [address] [facility] [address]  Phone: [phone]  Fax: [phone]   August 5, 2024        Renée Mcfadden  [address] [facility] [address]            Dear Renée,    You are currently under the care of [facility] Anticoagulation [facility] for your warfarin (Coumadin , Jantoven ) therapy.  We are contacting you because our records show you were due for an INR on 7/26/24.    There are potentially serious risks when taking warfarin without careful monitoring and we want to make sure you are safely managed.  Routine lab monitoring is required for warfarin refills.     Please call [phone] as soon as possible to schedule a lab appointment. If it is difficult for you to get to lab, please call us to discuss options.  If there has been a change in your care or other concerns, please let us know so we can help and/or update our records.         Sincerely,       [facility] Anticoagulation [facility]

## 2024-08-05 NOTE — TELEPHONE ENCOUNTER
ANTICOAGULATION     Renée Mcfadden is overdue for an INR check.     Was unable to reach patient and was unable to leave a voicemail. If patient calls, please schedule INR check as soon as possible.  and Reminder letter sent    Pratik Blanc RN

## 2024-08-12 ENCOUNTER — DOCUMENTATION ONLY (OUTPATIENT)
Dept: ANTICOAGULATION | Facility: CLINIC | Age: 59
End: 2024-08-12
Payer: COMMERCIAL

## 2024-08-12 NOTE — LETTER
[facility] ANTICOAGULATION CLINIC  [address] [facility] [address]  Phone: [phone]  Fax: [phone]   August 12, 2024        Renée Mcfadden  [address] [facility] [address]            Dear Renée,    You are currently under the care of [facility] Anticoagulation [facility] for your warfarin (Coumadin , Jantoven ) therapy.  We are contacting you because our records show you were due for an INR on 7/26/24.    There are potentially serious risks when taking warfarin without careful monitoring and we want to make sure you are safely managed.  Routine lab monitoring is required for warfarin refills.     Please call [phone] as soon as possible to schedule a lab appointment. If it is difficult for you to get to lab, please call us to discuss options.  If there has been a change in your care or other concerns, please let us know so we can help and/or update our records.         Sincerely,       [facility] Anticoagulation [facility]

## 2024-08-12 NOTE — PROGRESS NOTES
ANTICOAGULATION     Renée Mcfadden is overdue for an INR check.     Reminder letter sent    Patricia Chavis RN

## 2024-08-19 DIAGNOSIS — Z17.0 MALIGNANT NEOPLASM OF UPPER-OUTER QUADRANT OF LEFT BREAST IN FEMALE, ESTROGEN RECEPTOR POSITIVE (H): ICD-10-CM

## 2024-08-19 DIAGNOSIS — C50.412 MALIGNANT NEOPLASM OF UPPER-OUTER QUADRANT OF LEFT BREAST IN FEMALE, ESTROGEN RECEPTOR POSITIVE (H): ICD-10-CM

## 2024-08-19 RX ORDER — LETROZOLE 2.5 MG/1
2.5 TABLET, FILM COATED ORAL DAILY
Qty: 90 TABLET | Refills: 3 | Status: SHIPPED | OUTPATIENT
Start: 2024-08-19

## 2024-08-26 ENCOUNTER — DOCUMENTATION ONLY (OUTPATIENT)
Dept: ANTICOAGULATION | Facility: CLINIC | Age: 59
End: 2024-08-26
Payer: COMMERCIAL

## 2024-08-26 NOTE — PROGRESS NOTES
Anticoagulation Clinic Notification    Geraldine, is past due for an INR. Their last result was 1.93 on 7-12-24 and was due to come back on 7-26-24.    she received phone calls and letters over the last several weeks in attempt to arrange follow up labs. Renée Mcfadden will be contacted again today.     Please contact patient directly to discuss compliance with monitoring or schedule visit to review ongoing anticoagulation therapy.    Thank you,     [facility] Anticoagulation Clinic

## 2024-08-26 NOTE — LETTER
[facility] ANTICOAGULATION CLINIC  [address] [facility] [address]  Phone: [phone]  Fax: [phone]   August 26, 2024        Renée Mcfadden  [address] [facility] [address]            Dear Renée,    You are currently under the care of [facility]tico[facility] for your warfarin (Coumadin , Jantoven ) therapy.  We are contacting you because our records show you were due for an INR on 7-.    There are potentially serious risks when taking warfarin without careful monitoring and we want to make sure you are safely managed.  Routine lab monitoring is required for warfarin refills.     Please call [phone] as soon as possible to schedule a lab appointment. If it is difficult for you to get to lab, please call us to discuss options.  If there has been a change in your care or other concerns, please let us know so we can help and/or update our records.         Sincerely,       [facility] Anticoagulation [facility]

## 2024-08-26 NOTE — LETTER
August 30, 2024      Renée Mcfadden  [address] [facility] [address]        We have been unsuccessful reaching you by telephone. Please call the clinic at [phone] to schedule an appointment with a provider or let us know if you are getting care elsewhere.           Sincerely,        Davidson Alegria MD

## 2024-08-30 NOTE — PROGRESS NOTES
Called patient to schedule. Phone rang until it went busy. Letter has been placed in the mail     Jigna-

## 2024-09-09 ENCOUNTER — PATIENT OUTREACH (OUTPATIENT)
Dept: CARE COORDINATION | Facility: CLINIC | Age: 59
End: 2024-09-09
Payer: COMMERCIAL

## 2024-09-11 NOTE — PROGRESS NOTES
----- Message from Ulysses S Boco, MD sent at 12/25/2021 12:11 PM CST -----  Covid neg   ANTICOAGULATION MANAGEMENT     Renée Mcfadden 58 year old female is on warfarin with therapeutic INR result. (Goal INR 2.0-3.0)    Recent labs: (last 7 days)     03/29/24  1056   INR 2.6*       ASSESSMENT     Source(s): Chart Review  Previous INR was Therapeutic last visit; previously outside of goal range  Medication, diet, health changes since last INR chart reviewed; none identified         PLAN     Unable to reach [facility] today.    No instructions provided. Unable to leave voicemail.    Follow up required to confirm warfarin dose taken and assess for changes    Farrah Lopez RN  Anticoagulation Clinic  3/29/2024     show

## 2024-09-13 ENCOUNTER — LAB (OUTPATIENT)
Dept: LAB | Facility: CLINIC | Age: 59
End: 2024-09-13
Payer: COMMERCIAL

## 2024-09-13 ENCOUNTER — ANTICOAGULATION THERAPY VISIT (OUTPATIENT)
Dept: ANTICOAGULATION | Facility: CLINIC | Age: 59
End: 2024-09-13

## 2024-09-13 DIAGNOSIS — Z86.718 PERSONAL HISTORY OF DVT (DEEP VEIN THROMBOSIS): Primary | ICD-10-CM

## 2024-09-13 DIAGNOSIS — Z86.718 PERSONAL HISTORY OF DVT (DEEP VEIN THROMBOSIS): ICD-10-CM

## 2024-09-13 LAB — INR BLD: 2.1 (ref 0.9–1.1)

## 2024-09-13 PROCEDURE — 36416 COLLJ CAPILLARY BLOOD SPEC: CPT

## 2024-09-13 PROCEDURE — 85610 PROTHROMBIN TIME: CPT

## 2024-09-13 NOTE — PROGRESS NOTES
ANTICOAGULATION MANAGEMENT     Renée Mcfadden 58 year old female is on warfarin with therapeutic INR result. (Goal INR 2.0-3.0)    Recent labs: (last 7 days)     09/13/24  1046   INR 2.1*       ASSESSMENT     Source(s): Chart Review  Previous INR was Subtherapeutic  Medication, diet, health changes since last INR chart reviewed; none identified         PLAN     Unable to reach [facility] today.    Unable to leave voicemail. Sent [employer] for assessment.     Follow up required to confirm warfarin dose taken and assess for changes    Whitney Draper RN  9/13/2024  Anticoagulation Clinic  [facility] for routing messages: ingrid RAVI  [facility] patient phone line: [phone]

## 2024-09-16 NOTE — PROGRESS NOTES
ANTICOAGULATION MANAGEMENT     Renée Mcfadden 58 year old female is on warfarin with therapeutic INR result. (Goal INR 2.0-3.0)    Recent labs: (last 7 days)     09/13/24  1046   INR 2.1*       ASSESSMENT     Source(s): Chart Review  Previous INR was Therapeutic last visit; previously outside of goal range  Medication, diet, health changes since last INR chart reviewed; none identified         PLAN     Recommended plan for no diet, medication or health factor changes affecting INR     Dosing Instructions: Continue your current warfarin dose with next INR in 4 weeks       Summary  As of 9/13/2024      Full warfarin instructions:  7.5 mg every Tue, Thu, Sat; 5 mg all other days   Next INR check:                 Sent [employer] message with dosing and follow up instructions- unable to reach by phone or leave .    Contact [phone] to schedule and with any changes, questions or concerns.     Education provided: Please call back if any changes to your diet, medications or how you've been taking warfarin    Plan made per [facility] anticoagulation protocol    Patricia Chavis RN  9/16/2024  Anticoagulation Clinic  [employer] for routing messages: ingrid RAVI  [facility] patient phone line: [phone]        _______________________________________________________________________     Anticoagulation Episode Summary       Current INR goal:  2.0-3.0   TTR:  62.5% (1 y)   Target end date:  Indefinite   Send INR reminders to:  DOUGIE RAVI    Indications    Personal history of DVT (deep vein thrombosis) [Z86.718]             Comments:               Anticoagulation Care Providers       Provider Role Specialty Phone number    Davidson Alegria MD Referring Family Medicine [phone]

## 2024-10-11 ENCOUNTER — ANTICOAGULATION THERAPY VISIT (OUTPATIENT)
Dept: ANTICOAGULATION | Facility: CLINIC | Age: 59
End: 2024-10-11

## 2024-10-11 ENCOUNTER — DOCUMENTATION ONLY (OUTPATIENT)
Dept: ANTICOAGULATION | Facility: CLINIC | Age: 59
End: 2024-10-11

## 2024-10-11 ENCOUNTER — LAB (OUTPATIENT)
Dept: LAB | Facility: CLINIC | Age: 59
End: 2024-10-11
Payer: COMMERCIAL

## 2024-10-11 DIAGNOSIS — Z86.718 PERSONAL HISTORY OF DVT (DEEP VEIN THROMBOSIS): ICD-10-CM

## 2024-10-11 DIAGNOSIS — Z86.718 PERSONAL HISTORY OF DVT (DEEP VEIN THROMBOSIS): Primary | ICD-10-CM

## 2024-10-11 LAB — INR BLD: 2.7 (ref 0.9–1.1)

## 2024-10-11 PROCEDURE — 85610 PROTHROMBIN TIME: CPT

## 2024-10-11 PROCEDURE — 36416 COLLJ CAPILLARY BLOOD SPEC: CPT

## 2024-10-11 NOTE — CONFIDENTIAL NOTE
ANTICOAGULATION CLINIC REFERRAL RENEWAL REQUEST       An annual renewal order is required for all patients referred to [facility] Anticoagulation Clinic.?  Please review and sign the pended referral order for Renée Mcfadden.       ANTICOAGULATION SUMMARY      Warfarin indication(s)   DVT    Mechanical heart valve present?  NO       Current goal range   INR: 2.0-3.0     Goal appropriate for indication? Goal INR 2-3, standard for indication(s) above     Time in Therapeutic Range (TTR)  (Goal > 60%) 69.6%       Office visit with referring provider's group within last year yes on 11/29/23       Ethel Adams RN  [facility] Anticoagulation Clinic

## 2024-10-11 NOTE — PROGRESS NOTES
ANTICOAGULATION MANAGEMENT     Renée Mcfadden 59 year old female is on warfarin with therapeutic INR result. (Goal INR 2.0-3.0)    Recent labs: (last 7 days)     10/11/24  0710   INR 2.7*       ASSESSMENT     Source(s): Chart Review   Previous result: Therapeutic last visit; previously outside of goal range  Additional findings: None     PLAN     Unable to reach pt today.    No instructions provided. Unable to leave voicemail. Patient does not read [employer].    Follow up required to confirm warfarin dose taken and assess for changes    Ethel Adams RN  10/11/2024  Anticoagulation Clinic  [facility] for routing messages: ingrid RAVI  [facility] patient phone line: [phone]

## 2024-10-14 NOTE — PROGRESS NOTES
2nd day attempt - unable to reach pt or leave VM. Several attempts made. Closing encounter.  Ethel Adams RN on 10/14/2024 at 8:37 AM

## 2024-12-06 ENCOUNTER — ANCILLARY PROCEDURE (OUTPATIENT)
Dept: MAMMOGRAPHY | Facility: CLINIC | Age: 59
End: 2024-12-06
Attending: INTERNAL MEDICINE
Payer: COMMERCIAL

## 2024-12-06 DIAGNOSIS — Z12.31 ENCOUNTER FOR SCREENING MAMMOGRAM FOR BREAST CANCER: ICD-10-CM

## 2024-12-06 PROCEDURE — 77067 SCR MAMMO BI INCL CAD: CPT | Mod: TC | Performed by: RADIOLOGY

## 2024-12-06 PROCEDURE — 77063 BREAST TOMOSYNTHESIS BI: CPT | Mod: TC | Performed by: RADIOLOGY

## 2024-12-11 ENCOUNTER — ONCOLOGY VISIT (OUTPATIENT)
Dept: ONCOLOGY | Facility: HOSPITAL | Age: 59
End: 2024-12-11
Attending: INTERNAL MEDICINE
Payer: COMMERCIAL

## 2024-12-11 VITALS
HEIGHT: 64 IN | OXYGEN SATURATION: 100 % | RESPIRATION RATE: 20 BRPM | BODY MASS INDEX: 28.51 KG/M2 | WEIGHT: 167 LBS | SYSTOLIC BLOOD PRESSURE: 139 MMHG | HEART RATE: 86 BPM | DIASTOLIC BLOOD PRESSURE: 73 MMHG | TEMPERATURE: 97.2 F

## 2024-12-11 DIAGNOSIS — Z17.0 MALIGNANT NEOPLASM OF UPPER-OUTER QUADRANT OF LEFT BREAST IN FEMALE, ESTROGEN RECEPTOR POSITIVE (H): Primary | ICD-10-CM

## 2024-12-11 DIAGNOSIS — C50.412 MALIGNANT NEOPLASM OF UPPER-OUTER QUADRANT OF LEFT BREAST IN FEMALE, ESTROGEN RECEPTOR POSITIVE (H): Primary | ICD-10-CM

## 2024-12-11 DIAGNOSIS — Z79.811 AROMATASE INHIBITOR USE: ICD-10-CM

## 2024-12-11 PROCEDURE — 99213 OFFICE O/P EST LOW 20 MIN: CPT | Performed by: NURSE PRACTITIONER

## 2024-12-11 PROCEDURE — 99214 OFFICE O/P EST MOD 30 MIN: CPT | Performed by: NURSE PRACTITIONER

## 2024-12-11 PROCEDURE — G2211 COMPLEX E/M VISIT ADD ON: HCPCS | Performed by: NURSE PRACTITIONER

## 2024-12-11 ASSESSMENT — PAIN SCALES - GENERAL: PAINLEVEL_OUTOF10: MODERATE PAIN (5)

## 2024-12-11 NOTE — LETTER
12/11/2024      Renée Mcfadden  [address]  [facility] [address]      Dear Colleague,    Thank you for referring your patient, Renée Mcfadden, to the [facility] [place]. Please see a copy of my visit note below.    [facility] Hematology and Oncology Progress Note    Patient: Renée Mcfadden  MRN: [id number]  Date of Service: Dec 11, 2024          Reason for Visit    No chief complaint on file.      Assessment and Plan    Cancer Staging   Malignant neoplasm of upper-outer quadrant of left breast in female, estrogen receptor positive (H)  Staging form: Breast, AJCC 8th Edition  - Pathologic: Stage IA (pT1b, pN0, cM0, G1, ER+, MI+, HER2-, Oncotype DX score: 20) - Signed by Jorge Guevara MD on 3/1/2024    Breast cancer, left breast, Stage IA: pt had lumpectomy and has started adjuvant, curative hormonal therapy with letrozole. Recent mammogram last week was normal.  Will continue letrozole.  She has no clinical evidence of disease recurrence.  She will return in 6 months to see Dr. Guevara    Risk for Osteopenia:  risk for worsening bone density with the aromatase inhibitor.  She will continue calcium and vitamin D supplement. We will repeat her DEXA scan in April. Her baseline DEXA was normal.  Encourage also to participate in weightbearing exercises. Good calcium in diet.       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     Deep vein thrombosis (DVT) of proximal lower extremity, unspecified chronicity, unspecified laterality (H)     Personal history of DVT (deep vein thrombosis)     Anticardiolipin antibody positive     Mild aortic stenosis     Mild mitral stenosis     Malignant neoplasm of upper-outer  "quadrant of left breast in female, estrogen receptor positive (H)        ______________________________________________________________________________    History of Present Illness    Oncologist: Dr. Kahn    Diagnosis: Breast cancer. Left breast. Found on Screening mammogram.  Ultrasound showed a 6 x 4 x 5 mm hypoechoic mass with microlobulated margins at 2 o'clock position the left breast about 8 cm from the nipple.   -1/18/23: Needle biopsy showed invasive ductal carcinoma, grade 1, with background of DCIS, strongly ER positive(95%), AZ negative and HER2 negative (1+) by IHC. No evidence of abnormal lymph nodes in the left axilla     Treatment:   -2/15/23: lumpectomy. Path showed pT1b, pN0, cM0, Oncotype DX score 20  Adjuvant radiation was not considered due to scleroderma/reynauds  -April 2023: letrozole.     Interim History:   Patient is here today for 6-month follow-up visit.  Overall she states she is doing well and really has no major complaints.  No changes in her breast.  She had her mammogram last week so she is hoping that that looks good.  She denies any weight loss.  Denies any new bone or back pain.  Denies any infectious complaints.      Review of Systems    Pertinent items are noted in HPI.    Past History    Past Medical History:   Diagnosis Date     Cancer (H) 01/27/2023     Depressive disorder      DVT (deep venous thrombosis) (H) 2003    Left leg     Hypertension 07/01/2014    last 3-4 years     Papanicolaou smear of cervix with low grade squamous intraepithelial lesion (LGSIL) 11/10/2017     Seizure (H) Age 9    Dilantin prescribed by Dr. Dodge. D/c age 11 .     Smoker 08/27/2013       PHYSICAL EXAM  /73 (BP Location: Left arm, Patient Position: Sitting, Cuff Size: Adult Regular)   Pulse 86   Temp 97.2  F (36.2  C) (Tympanic)   Resp 20   Ht 1.632 m (5' 4.25\")   Wt 75.8 kg (167 lb)   SpO2 100%   BMI 28.44 kg/m      GENERAL: no acute distress. Cooperative in conversation. Alone in " clinic  RESP: Regular respiratory rate. No expiratory wheezes   NEURO: non focal. Alert and oriented x3.   PSYCH: within normal limits. No depression or anxiety.  SKIN: exposed skin is dry intact.   BREAST: Deferred. Just had mammogram.     Lab Results    Recent Results (from the past week)   Comprehensive metabolic panel   Result Value Ref Range    Sodium 136 135 - 145 mmol/L    Potassium 4.2 3.4 - 5.3 mmol/L    Carbon Dioxide (CO2) 23 22 - 29 mmol/L    Anion Gap 10 7 - 15 mmol/L    Urea Nitrogen 11.4 8.0 - 23.0 mg/dL    Creatinine 0.76 0.51 - 0.95 mg/dL    GFR Estimate 89 >60 mL/min/1.73m2    Calcium 9.2 8.8 - 10.4 mg/dL    Chloride 103 98 - 107 mmol/L    Glucose 87 70 - 99 mg/dL    Alkaline Phosphatase 65 40 - 150 U/L    AST 22 0 - 45 U/L    ALT 16 0 - 50 U/L    Protein Total 7.3 6.4 - 8.3 g/dL    Albumin 4.3 3.5 - 5.2 g/dL    Bilirubin Total 0.3 <=1.2 mg/dL    Patient Fasting > 8hrs? Yes    Lipid panel reflex to direct LDL Fasting   Result Value Ref Range    Cholesterol 207 (H) <200 mg/dL    Triglycerides 57 <150 mg/dL    Direct Measure HDL 80 >=50 mg/dL    LDL Cholesterol Calculated 116 (H) <100 mg/dL    Non HDL Cholesterol 127 <130 mg/dL    Patient Fasting > 8hrs? Yes    TSH with free T4 reflex   Result Value Ref Range    TSH 2.69 0.30 - 4.20 uIU/mL   CBC with platelets and differential   Result Value Ref Range    WBC Count 5.9 4.0 - 11.0 10e3/uL    RBC Count 4.49 3.80 - 5.20 10e6/uL    Hemoglobin 14.5 11.7 - 15.7 g/dL    Hematocrit 44.6 35.0 - 47.0 %    MCV 99 78 - 100 fL    MCH 32.3 26.5 - 33.0 pg    MCHC 32.5 31.5 - 36.5 g/dL    RDW 12.6 10.0 - 15.0 %    Platelet Count 247 150 - 450 10e3/uL    % Neutrophils 61 %    % Lymphocytes 30 %    % Monocytes 7 %    % Eosinophils 2 %    % Basophils 1 %    % Immature Granulocytes 0 %    Absolute Neutrophils 3.6 1.6 - 8.3 10e3/uL    Absolute Lymphocytes 1.8 0.8 - 5.3 10e3/uL    Absolute Monocytes 0.4 0.0 - 1.3 10e3/uL    Absolute Eosinophils 0.1 0.0 - 0.7 10e3/uL     "Absolute Basophils 0.0 0.0 - 0.2 10e3/uL    Absolute Immature Granulocytes 0.0 <=0.4 10e3/uL       Imaging    MA Screen Bilateral w/Gonsalo    Result Date: 12/9/2024  BILATERAL FULL FIELD DIGITAL SCREENING MAMMOGRAM WITH TOMOSYNTHESIS Performed on: 12/6/24 Compared to: 12/05/2023, 01/13/2023, 12/09/2022, and 12/04/2017 Technique:  This study was evaluated with the assistance of Computer-Aided Detection.  Breast Tomosynthesis was used in interpretation. Findings: There are scattered areas of fibroglandular density.  There are post-surgical changes in the left breast. There is no radiographic evidence of malignancy.     IMPRESSION: ACR BI-RADS Category 2: Benign BREAST CANCER SCREENING RECOMMENDATION: Routine yearly mammography beginning at age 40 or as discussed with your provider. The results and recommendations of this examination will be communicated to the patient. Jos Santos MD      The longitudinal plan of care for the diagnosis(es)/condition(s) as documented were addressed during this visit. Due to the added complexity in care, I will continue to support Geraldine in the subsequent management and with ongoing continuity of care.      Signed by: RYANNE Garza CNP    Oncology Rooming Note    December 11, 2024 9:36 AM   Renée Mcfadden is a 59 year old female who presents for:    Chief Complaint   Patient presents with     Oncology Clinic Visit     5 month follow up     Initial Vitals: /73 (BP Location: Left arm, Patient Position: Sitting, Cuff Size: Adult Regular)   Pulse 86   Temp 97.2  F (36.2  C) (Tympanic)   Resp 20   Ht 1.632 m (5' 4.25\")   Wt 75.8 kg (167 lb)   SpO2 100%   BMI 28.44 kg/m   Estimated body mass index is 28.44 kg/m  as calculated from the following:    Height as of this encounter: 1.632 m (5' 4.25\").    Weight as of this encounter: 75.8 kg (167 lb). Body surface area is 1.85 meters squared.  Moderate Pain (5) Comment: Data Unavailable   No LMP recorded. Patient " is postmenopausal.  Allergies reviewed: Yes  Medications reviewed: Yes    Medications: Medication refills not needed today.  Pharmacy name entered into [email or web address]:    [facility] - ONE [facility] AT PORTAL TO REGISTERED [facility] SITES  [id number] - [place], [place] - [address] AT [address]    Frailty Screening:   Is the patient here for a new oncology consult visit in cancer care? 2. No      Clinical concerns:       BALJINDER QUINONES CMA                Again, thank you for allowing me to participate in the care of your patient.        Sincerely,        RYANNE Garza CNP

## 2024-12-11 NOTE — PROGRESS NOTES
"Oncology Rooming Note    December 11, 2024 9:36 AM   Renée Mcfadden is a 59 year old female who presents for:    Chief Complaint   Patient presents with    Oncology Clinic Visit     5 month follow up     Initial Vitals: /73 (BP Location: Left arm, Patient Position: Sitting, Cuff Size: Adult Regular)   Pulse 86   Temp 97.2  F (36.2  C) (Tympanic)   Resp 20   Ht 1.632 m (5' 4.25\")   Wt 75.8 kg (167 lb)   SpO2 100%   BMI 28.44 kg/m   Estimated body mass index is 28.44 kg/m  as calculated from the following:    Height as of this encounter: 1.632 m (5' 4.25\").    Weight as of this encounter: 75.8 kg (167 lb). Body surface area is 1.85 meters squared.  Moderate Pain (5) Comment: Data Unavailable   No LMP recorded. Patient is postmenopausal.  Allergies reviewed: Yes  Medications reviewed: Yes    Medications: Medication refills not needed today.  Pharmacy name entered into Roll[employer]:    [facility] ONE [facility] AT PORTAL TO REGISTERED [facility] SITES  [id number] - [place], [place] - [address] AT CORNER OF [facility]    Frailty Screening:   Is the patient here for a new oncology consult visit in cancer care? 2. No      Clinical concerns:       BALJINDER QUINONES CMA              "

## 2024-12-14 ENCOUNTER — TELEPHONE (OUTPATIENT)
Dept: FAMILY MEDICINE | Facility: CLINIC | Age: 59
End: 2024-12-14
Payer: COMMERCIAL

## 2024-12-14 NOTE — TELEPHONE ENCOUNTER
I had sent patient a [employer] message over a week ago, but please also call her to advise her she should schedule an echocardiogram ( heart ultrasound ) to recheck the narrowed valves.  I had put in order.  She can call [phone] to schedule.    Thanks.    Davidson Alegria MD

## 2024-12-14 NOTE — LETTER
December 19, 2024    Prateek Chandler    I sent you a mychart message but also wanted to send a letter.     I put in order for echocardiogram ( heart ultrasound ).  Please call the clinic and schedule this.     You have some narrowing of the aortic valve and we should recheck this since it has been a couple years.               Sincerely,                   Davidson Alegria MD

## 2024-12-17 NOTE — TELEPHONE ENCOUNTER
Attempted to call patient at both home and mobile phone number. VM not set up yet. Will need to recall at a later time. Please review [employer] message to see if patient has viewed it before calling again (dated 12/6/24)    SHAWN Gamez RN  [facility]

## 2025-02-06 NOTE — TELEPHONE ENCOUNTER
Called patient and left a message to return call and schedule an appointment for health maintenance items that are due.  Laura Bazzi CMA            No

## 2025-03-18 NOTE — LETTER
March 29, 2022      eRnée Mcfadden  [address] [facility] [address]    Dear Renée,    You are currently under the care of [facility] for your warfarin (Coumadin ) therapy.  We are contacting you because our records show you were due for an INR on 3/15/22.    There are potentially serious risks when taking warfarin without careful monitoring and we want to make sure you are safely managed.  Routine INR monitoring is required for warfarin refills.     Please call [phone]  as soon as possible to schedule an appointment.  If there has been a change in your care or other concerns, please let us know so we can help and or update our records.     Sincerely,       [facility]        
no

## 2025-03-19 ENCOUNTER — ANTICOAGULATION THERAPY VISIT (OUTPATIENT)
Dept: ANTICOAGULATION | Facility: CLINIC | Age: 60
End: 2025-03-19

## 2025-03-19 ENCOUNTER — LAB (OUTPATIENT)
Dept: LAB | Facility: CLINIC | Age: 60
End: 2025-03-19
Payer: COMMERCIAL

## 2025-03-19 DIAGNOSIS — Z86.718 PERSONAL HISTORY OF DVT (DEEP VEIN THROMBOSIS): ICD-10-CM

## 2025-03-19 DIAGNOSIS — Z86.718 PERSONAL HISTORY OF DVT (DEEP VEIN THROMBOSIS): Primary | ICD-10-CM

## 2025-03-19 LAB — INR BLD: 1.9 (ref 0.9–1.1)

## 2025-03-19 PROCEDURE — 36416 COLLJ CAPILLARY BLOOD SPEC: CPT

## 2025-03-19 PROCEDURE — 85610 PROTHROMBIN TIME: CPT

## 2025-03-19 NOTE — PROGRESS NOTES
ANTICOAGULATION MANAGEMENT     Renée Mcfadden 59 year old female is on warfarin with subtherapeutic INR result. (Goal INR 2.0-3.0)    Recent labs: (last 7 days)     03/19/25  0924   INR 1.9*       ASSESSMENT     Source(s): Chart Review  Previous INR was Therapeutic last visit; previously outside of goal range  Medication, diet, health changes since last INR chart reviewed; none identified         PLAN     Unable to reach [facility] today.    LMTCB    Follow up required to confirm warfarin dose taken and assess for changes and discuss out of range result     Farrah Lopez RN  3/19/2025  Anticoagulation Clinic  [facility] for routing messages: ingrid RAVI  [facility] patient phone line: [phone]

## 2025-03-19 NOTE — PROGRESS NOTES
Unable to reach [facility] today.    Left message to continue current dose of warfarin 5 mg tonight. Request call back for assessment.    Follow up required to confirm warfarin dose taken and assess for changes and discuss out of range result     Farrah Lopez RN  3/19/2025  Anticoagulation Clinic  [facility] for routing messages: ingrid RAVI  [facility] patient phone line: [phone]

## 2025-03-20 NOTE — PROGRESS NOTES
ANTICOAGULATION MANAGEMENT     Renée Mcfadden 59 year old female is on warfarin with subtherapeutic INR result. (Goal INR 2.0-3.0)    Recent labs: (last 7 days)     03/19/25  0924   INR 1.9*       ASSESSMENT     Source(s): Chart Review  Previous INR was Therapeutic last visit; previously outside of goal range  Medication, diet, health changes since last INR chart reviewed; none identified         PLAN     Recommended plan for no diet, medication or health factor changes affecting INR     Dosing Instructions: Increase your warfarin dose (5.9% change) with next INR in 2 weeks       Summary  As of 3/19/2025      Full warfarin instructions:  5 mg every Mon, Wed, Fri; 7.5 mg all other days   Next INR check:  4/2/2025               Detailed voice message left for Geraldine with dosing instructions and follow up date.     Contact [phone] to schedule and with any changes, questions or concerns.     Education provided: Please call back if any changes to your diet, medications or how you've been taking warfarin  Goal range and lab monitoring: goal range and significance of current result    Plan made per [facility] anticoagulation protocol    Farrah Lopez RN  3/20/2025  Anticoagulation Clinic  [facility] [facility] for routing messages: ingrid RAVI  [facility] patient phone line: [phone]        _______________________________________________________________________     Anticoagulation Episode Summary       Current INR goal:  2.0-3.0   TTR:  61.5% (1 y)   Target end date:  Indefinite   Send INR reminders to:  DOUGIE RAVI    Indications    Personal history of DVT (deep vein thrombosis) [Z86.317]             Comments:  --             Anticoagulation Care Providers       Provider Role Specialty Phone number    Davidson Alegria MD Referring Family Medicine [phone]

## 2025-03-27 ENCOUNTER — PATIENT OUTREACH (OUTPATIENT)
Dept: FAMILY MEDICINE | Facility: CLINIC | Age: 60
End: 2025-03-27
Payer: COMMERCIAL

## 2025-03-27 DIAGNOSIS — R87.612 PAPANICOLAOU SMEAR OF CERVIX WITH LOW GRADE SQUAMOUS INTRAEPITHELIAL LESION (LGSIL): Primary | ICD-10-CM

## 2025-04-16 ENCOUNTER — TELEPHONE (OUTPATIENT)
Dept: ONCOLOGY | Facility: HOSPITAL | Age: 60
End: 2025-04-16
Payer: COMMERCIAL

## 2025-05-08 ENCOUNTER — HOSPITAL ENCOUNTER (OUTPATIENT)
Dept: BONE DENSITY | Facility: HOSPITAL | Age: 60
Discharge: HOME | End: 2025-05-08
Attending: NURSE PRACTITIONER
Payer: COMMERCIAL

## 2025-05-08 DIAGNOSIS — Z79.811 AROMATASE INHIBITOR USE: ICD-10-CM

## 2025-05-08 DIAGNOSIS — Z17.0 MALIGNANT NEOPLASM OF UPPER-OUTER QUADRANT OF LEFT BREAST IN FEMALE, ESTROGEN RECEPTOR POSITIVE (H): ICD-10-CM

## 2025-05-08 DIAGNOSIS — C50.412 MALIGNANT NEOPLASM OF UPPER-OUTER QUADRANT OF LEFT BREAST IN FEMALE, ESTROGEN RECEPTOR POSITIVE (H): ICD-10-CM

## 2025-05-08 PROCEDURE — 77080 DXA BONE DENSITY AXIAL: CPT

## 2025-05-12 ENCOUNTER — RESULTS FOLLOW-UP (OUTPATIENT)
Dept: ONCOLOGY | Facility: HOSPITAL | Age: 60
End: 2025-05-12

## 2025-05-15 ENCOUNTER — PATIENT OUTREACH (OUTPATIENT)
Dept: ONCOLOGY | Facility: HOSPITAL | Age: 60
End: 2025-05-15
Payer: COMMERCIAL

## 2025-05-15 NOTE — PROGRESS NOTES
[facility]: Cancer Care                                                                                      RN Cancer Care Coordinator called patient (mobile) at request of Priscilla An CNP regarding results of Bone Density scan. No answer, Left generic message.    Called again to home number. No answer.     Called again later in afternoon. Spoke wit patient and relayed message to her. She states that this is a reminder for her to more regularly take her calcium citrate - as her bone density was not as good as 2 yrs ago, but still WNL. We discussed strategies for remembering to take medications.   We discussed that she is having connectivity issues with [employer], but she does have the help line phone number for it.   Confirmed her RTC with Dr. Guevara on 6/20.   She voiced appreciation for the call.    Signature:  Margie Green RN

## 2025-05-23 ENCOUNTER — RESULTS FOLLOW-UP (OUTPATIENT)
Dept: ANTICOAGULATION | Facility: CLINIC | Age: 60
End: 2025-05-23

## 2025-06-06 ENCOUNTER — RESULTS FOLLOW-UP (OUTPATIENT)
Dept: ANTICOAGULATION | Facility: CLINIC | Age: 60
End: 2025-06-06

## 2025-06-20 ENCOUNTER — RESULTS FOLLOW-UP (OUTPATIENT)
Dept: ANTICOAGULATION | Facility: CLINIC | Age: 60
End: 2025-06-20

## 2025-06-20 ENCOUNTER — TELEPHONE (OUTPATIENT)
Dept: FAMILY MEDICINE | Facility: CLINIC | Age: 60
End: 2025-06-20

## 2025-06-20 NOTE — TELEPHONE ENCOUNTER
Patient Returning Call    Reason for call:  Pt is requesting to talk to INR to discuss dosage    Information relayed to patient:  the INR team will give you a call back when they receive this message    Patient has additional questions:  No      Could we send this information to you in [place] or would you prefer to receive a phone call?:   Patient would prefer a phone call   Okay to leave a detailed message?: Yes at Cell number on file:    Telephone Information:   Mobile [phone]      <-- Click to add NO significant Past Surgical History

## 2025-06-20 NOTE — TELEPHONE ENCOUNTER
2nd attempt - no answer. VM previously left for pt to return call to [facility].  Ethel Adams RN on 6/20/2025 at 4:33 PM    VM left for pt.  Ethel Adams RN on 6/20/2025 at 1:44 PM

## 2025-06-23 NOTE — TELEPHONE ENCOUNTER
3rd attempt - left  to call with changes, questions or concerns. No questions or concerns - follow instructions left in VM on 6/20.  Ethel Adams RN on 6/23/2025 at 1:22 PM

## 2025-07-09 ENCOUNTER — TELEPHONE (OUTPATIENT)
Dept: ANTICOAGULATION | Facility: CLINIC | Age: 60
End: 2025-07-09
Payer: COMMERCIAL

## 2025-07-09 NOTE — TELEPHONE ENCOUNTER
ANTICOAGULATION     Renée Mcfadden is overdue for an INR check.     Care Everywhere updated: yes    Left message for patient to call and schedule lab appointment as soon as possible. If returning call, please schedule.     Farrah Lopez RN  7/9/2025  Anticoagulation Clinic  [facility] for routing messages: ingrid RAVI  [facility] patient phone line: [phone]

## 2025-09-02 ENCOUNTER — TELEPHONE (OUTPATIENT)
Dept: ONCOLOGY | Facility: HOSPITAL | Age: 60
End: 2025-09-02
Payer: COMMERCIAL

## 2025-09-02 DIAGNOSIS — Z17.0 MALIGNANT NEOPLASM OF UPPER-OUTER QUADRANT OF LEFT BREAST IN FEMALE, ESTROGEN RECEPTOR POSITIVE (H): ICD-10-CM

## 2025-09-02 DIAGNOSIS — C50.412 MALIGNANT NEOPLASM OF UPPER-OUTER QUADRANT OF LEFT BREAST IN FEMALE, ESTROGEN RECEPTOR POSITIVE (H): ICD-10-CM

## 2025-09-02 RX ORDER — LETROZOLE 2.5 MG/1
2.5 TABLET, FILM COATED ORAL DAILY
Qty: 90 TABLET | Refills: 0 | Status: SHIPPED | OUTPATIENT
Start: 2025-09-02